# Patient Record
Sex: FEMALE | Race: WHITE | Employment: OTHER | URBAN - METROPOLITAN AREA
[De-identification: names, ages, dates, MRNs, and addresses within clinical notes are randomized per-mention and may not be internally consistent; named-entity substitution may affect disease eponyms.]

---

## 2017-01-24 ENCOUNTER — OFFICE VISIT (OUTPATIENT)
Dept: FAMILY MEDICINE CLINIC | Age: 82
End: 2017-01-24

## 2017-01-24 VITALS
DIASTOLIC BLOOD PRESSURE: 76 MMHG | OXYGEN SATURATION: 92 % | HEART RATE: 75 BPM | RESPIRATION RATE: 14 BRPM | SYSTOLIC BLOOD PRESSURE: 124 MMHG

## 2017-01-24 DIAGNOSIS — R53.1 GENERALIZED WEAKNESS: ICD-10-CM

## 2017-01-24 DIAGNOSIS — M54.50 CHRONIC BILATERAL LOW BACK PAIN WITHOUT SCIATICA: ICD-10-CM

## 2017-01-24 DIAGNOSIS — R09.89 BILATERAL CAROTID BRUITS: ICD-10-CM

## 2017-01-24 DIAGNOSIS — I10 ESSENTIAL HYPERTENSION: Primary | Chronic | ICD-10-CM

## 2017-01-24 DIAGNOSIS — Z23 NEED FOR PNEUMOCOCCAL VACCINE: ICD-10-CM

## 2017-01-24 DIAGNOSIS — G89.29 CHRONIC BILATERAL LOW BACK PAIN WITHOUT SCIATICA: ICD-10-CM

## 2017-01-24 PROCEDURE — 90670 PCV13 VACCINE IM: CPT | Performed by: FAMILY MEDICINE

## 2017-01-24 PROCEDURE — 99213 OFFICE O/P EST LOW 20 MIN: CPT | Performed by: FAMILY MEDICINE

## 2017-01-24 PROCEDURE — G0009 ADMIN PNEUMOCOCCAL VACCINE: HCPCS | Performed by: FAMILY MEDICINE

## 2017-01-24 ASSESSMENT — ENCOUNTER SYMPTOMS
GASTROINTESTINAL NEGATIVE: 1
RESPIRATORY NEGATIVE: 1

## 2017-02-20 RX ORDER — TRAMADOL HYDROCHLORIDE 50 MG/1
TABLET ORAL
Qty: 60 TABLET | Refills: 2 | Status: SHIPPED | OUTPATIENT
Start: 2017-02-20 | End: 2017-05-01 | Stop reason: SDUPTHER

## 2017-02-21 RX ORDER — RAMIPRIL 10 MG/1
CAPSULE ORAL
Qty: 90 CAPSULE | Refills: 3 | Status: SHIPPED | OUTPATIENT
Start: 2017-02-21 | End: 2018-02-13 | Stop reason: SDUPTHER

## 2017-05-01 RX ORDER — TRAMADOL HYDROCHLORIDE 50 MG/1
TABLET ORAL
Qty: 60 TABLET | Refills: 2 | Status: SHIPPED | OUTPATIENT
Start: 2017-05-01 | End: 2017-07-06 | Stop reason: SDUPTHER

## 2017-05-02 RX ORDER — HYDROCHLOROTHIAZIDE 25 MG/1
TABLET ORAL
Qty: 45 TABLET | Refills: 3 | Status: ON HOLD | OUTPATIENT
Start: 2017-05-02 | End: 2017-07-13 | Stop reason: HOSPADM

## 2017-07-06 RX ORDER — TRAMADOL HYDROCHLORIDE 50 MG/1
TABLET ORAL
Qty: 60 TABLET | Refills: 2 | Status: SHIPPED | OUTPATIENT
Start: 2017-07-06 | End: 2017-09-22 | Stop reason: SDUPTHER

## 2017-07-19 ENCOUNTER — OFFICE VISIT (OUTPATIENT)
Dept: FAMILY MEDICINE CLINIC | Age: 82
End: 2017-07-19

## 2017-07-19 VITALS — DIASTOLIC BLOOD PRESSURE: 72 MMHG | HEART RATE: 100 BPM | SYSTOLIC BLOOD PRESSURE: 124 MMHG | OXYGEN SATURATION: 90 %

## 2017-07-19 DIAGNOSIS — K92.2 GASTROINTESTINAL HEMORRHAGE, UNSPECIFIED GASTROINTESTINAL HEMORRHAGE TYPE: ICD-10-CM

## 2017-07-19 DIAGNOSIS — R53.1 GENERALIZED WEAKNESS: ICD-10-CM

## 2017-07-19 DIAGNOSIS — K92.2 GASTROINTESTINAL HEMORRHAGE, UNSPECIFIED GASTROINTESTINAL HEMORRHAGE TYPE: Primary | ICD-10-CM

## 2017-07-19 DIAGNOSIS — N39.0 URINARY TRACT INFECTION, SITE UNSPECIFIED: ICD-10-CM

## 2017-07-19 DIAGNOSIS — Z79.01 ANTICOAGULANT LONG-TERM USE: ICD-10-CM

## 2017-07-19 DIAGNOSIS — Z09 HOSPITAL DISCHARGE FOLLOW-UP: ICD-10-CM

## 2017-07-19 LAB
ANION GAP SERPL CALCULATED.3IONS-SCNC: 14 MMOL/L (ref 3–16)
BASOPHILS ABSOLUTE: 0.1 K/UL (ref 0–0.2)
BASOPHILS RELATIVE PERCENT: 1.7 %
BUN BLDV-MCNC: 17 MG/DL (ref 7–20)
CALCIUM SERPL-MCNC: 9.3 MG/DL (ref 8.3–10.6)
CHLORIDE BLD-SCNC: 104 MMOL/L (ref 99–110)
CO2: 25 MMOL/L (ref 21–32)
CREAT SERPL-MCNC: 0.7 MG/DL (ref 0.6–1.2)
EOSINOPHILS ABSOLUTE: 0.2 K/UL (ref 0–0.6)
EOSINOPHILS RELATIVE PERCENT: 3.2 %
GFR AFRICAN AMERICAN: >60
GFR NON-AFRICAN AMERICAN: >60
GLUCOSE BLD-MCNC: 126 MG/DL (ref 70–99)
HCT VFR BLD CALC: 37.6 % (ref 36–48)
HEMOGLOBIN: 10.8 G/DL (ref 12–16)
IRON SATURATION: 43 % (ref 15–50)
IRON: 164 UG/DL (ref 37–145)
LYMPHOCYTES ABSOLUTE: 1.1 K/UL (ref 1–5.1)
LYMPHOCYTES RELATIVE PERCENT: 16.6 %
MCH RBC QN AUTO: 21.9 PG (ref 26–34)
MCHC RBC AUTO-ENTMCNC: 28.9 G/DL (ref 31–36)
MCV RBC AUTO: 76 FL (ref 80–100)
MONOCYTES ABSOLUTE: 0.6 K/UL (ref 0–1.3)
MONOCYTES RELATIVE PERCENT: 9.2 %
NEUTROPHILS ABSOLUTE: 4.7 K/UL (ref 1.7–7.7)
NEUTROPHILS RELATIVE PERCENT: 69.3 %
PDW BLD-RTO: 21.6 % (ref 12.4–15.4)
PLATELET # BLD: 315 K/UL (ref 135–450)
PMV BLD AUTO: 10 FL (ref 5–10.5)
POTASSIUM SERPL-SCNC: 4.4 MMOL/L (ref 3.5–5.1)
RBC # BLD: 4.94 M/UL (ref 4–5.2)
SODIUM BLD-SCNC: 143 MMOL/L (ref 136–145)
TOTAL IRON BINDING CAPACITY: 379 UG/DL (ref 260–445)
WBC # BLD: 6.8 K/UL (ref 4–11)

## 2017-07-19 PROCEDURE — 99214 OFFICE O/P EST MOD 30 MIN: CPT | Performed by: NURSE PRACTITIONER

## 2017-07-19 ASSESSMENT — ENCOUNTER SYMPTOMS
ABDOMINAL PAIN: 0
BLOOD IN STOOL: 0
VOMITING: 0
ORTHOPNEA: 0
WHEEZING: 0
SHORTNESS OF BREATH: 1
COUGH: 0
NAUSEA: 0
DIARRHEA: 0

## 2017-08-15 RX ORDER — AMLODIPINE BESYLATE 2.5 MG/1
TABLET ORAL
Qty: 30 TABLET | Refills: 8 | Status: SHIPPED | OUTPATIENT
Start: 2017-08-15 | End: 2018-05-09 | Stop reason: SDUPTHER

## 2017-08-28 RX ORDER — RIVAROXABAN 20 MG/1
TABLET, FILM COATED ORAL
Qty: 30 TABLET | Refills: 11 | Status: SHIPPED | OUTPATIENT
Start: 2017-08-28 | End: 2018-08-31 | Stop reason: SDUPTHER

## 2017-09-01 ENCOUNTER — OFFICE VISIT (OUTPATIENT)
Dept: FAMILY MEDICINE CLINIC | Age: 82
End: 2017-09-01

## 2017-09-01 VITALS — OXYGEN SATURATION: 92 % | DIASTOLIC BLOOD PRESSURE: 70 MMHG | SYSTOLIC BLOOD PRESSURE: 128 MMHG | HEART RATE: 84 BPM

## 2017-09-01 DIAGNOSIS — R35.0 URINARY FREQUENCY: Primary | ICD-10-CM

## 2017-09-01 LAB
BACTERIA URINE, POC: ABNORMAL
BILIRUBIN URINE: 0 MG/DL
BLOOD, URINE: POSITIVE
CASTS URINE, POC: ABNORMAL
CLARITY: ABNORMAL
COLOR: YELLOW
CRYSTALS URINE, POC: ABNORMAL
EPI CELLS URINE, POC: ABNORMAL
GLUCOSE URINE: NEGATIVE
KETONES, URINE: POSITIVE
LEUKOCYTE EST, POC: ABNORMAL
NITRITE, URINE: POSITIVE
PH UA: 5.5 (ref 4.5–8)
PROTEIN UA: NEGATIVE
RBC URINE, POC: ABNORMAL
SPECIFIC GRAVITY UA: 1.02 (ref 1–1.03)
UROBILINOGEN, URINE: NORMAL
WBC URINE, POC: ABNORMAL
YEAST URINE, POC: ABNORMAL

## 2017-09-01 PROCEDURE — 99213 OFFICE O/P EST LOW 20 MIN: CPT | Performed by: FAMILY MEDICINE

## 2017-09-01 PROCEDURE — 81000 URINALYSIS NONAUTO W/SCOPE: CPT | Performed by: FAMILY MEDICINE

## 2017-09-01 RX ORDER — CEFUROXIME AXETIL 250 MG/1
250 TABLET ORAL 2 TIMES DAILY
Qty: 14 TABLET | Refills: 0 | Status: SHIPPED | OUTPATIENT
Start: 2017-09-01 | End: 2017-09-08

## 2017-09-04 LAB
ORGANISM: ABNORMAL
URINE CULTURE, ROUTINE: ABNORMAL

## 2017-09-25 ENCOUNTER — OFFICE VISIT (OUTPATIENT)
Dept: FAMILY MEDICINE CLINIC | Age: 82
End: 2017-09-25

## 2017-09-25 VITALS
DIASTOLIC BLOOD PRESSURE: 66 MMHG | HEART RATE: 75 BPM | WEIGHT: 140 LBS | OXYGEN SATURATION: 93 % | SYSTOLIC BLOOD PRESSURE: 114 MMHG | BODY MASS INDEX: 27.34 KG/M2

## 2017-09-25 DIAGNOSIS — I10 ESSENTIAL HYPERTENSION: Primary | Chronic | ICD-10-CM

## 2017-09-25 DIAGNOSIS — Z23 NEEDS FLU SHOT: ICD-10-CM

## 2017-09-25 DIAGNOSIS — D50.9 IRON DEFICIENCY ANEMIA, UNSPECIFIED IRON DEFICIENCY ANEMIA TYPE: ICD-10-CM

## 2017-09-25 DIAGNOSIS — G89.29 OTHER CHRONIC PAIN: ICD-10-CM

## 2017-09-25 LAB
BASOPHILS ABSOLUTE: 0.1 K/UL (ref 0–0.2)
BASOPHILS RELATIVE PERCENT: 1.1 %
EOSINOPHILS ABSOLUTE: 0.1 K/UL (ref 0–0.6)
EOSINOPHILS RELATIVE PERCENT: 2 %
HCT VFR BLD CALC: 44.4 % (ref 36–48)
HEMOGLOBIN: 14 G/DL (ref 12–16)
IRON SATURATION: 17 % (ref 15–50)
IRON: 59 UG/DL (ref 37–145)
LYMPHOCYTES ABSOLUTE: 1.2 K/UL (ref 1–5.1)
LYMPHOCYTES RELATIVE PERCENT: 18.1 %
MCH RBC QN AUTO: 26.1 PG (ref 26–34)
MCHC RBC AUTO-ENTMCNC: 31.5 G/DL (ref 31–36)
MCV RBC AUTO: 83 FL (ref 80–100)
MONOCYTES ABSOLUTE: 0.7 K/UL (ref 0–1.3)
MONOCYTES RELATIVE PERCENT: 10.9 %
NEUTROPHILS ABSOLUTE: 4.5 K/UL (ref 1.7–7.7)
NEUTROPHILS RELATIVE PERCENT: 67.9 %
PDW BLD-RTO: 20.9 % (ref 12.4–15.4)
PLATELET # BLD: 315 K/UL (ref 135–450)
PMV BLD AUTO: 11 FL (ref 5–10.5)
RBC # BLD: 5.36 M/UL (ref 4–5.2)
TOTAL IRON BINDING CAPACITY: 347 UG/DL (ref 260–445)
WBC # BLD: 6.6 K/UL (ref 4–11)

## 2017-09-25 PROCEDURE — G0008 ADMIN INFLUENZA VIRUS VAC: HCPCS | Performed by: FAMILY MEDICINE

## 2017-09-25 PROCEDURE — 90662 IIV NO PRSV INCREASED AG IM: CPT | Performed by: FAMILY MEDICINE

## 2017-09-25 PROCEDURE — 99213 OFFICE O/P EST LOW 20 MIN: CPT | Performed by: FAMILY MEDICINE

## 2017-09-25 RX ORDER — TRAMADOL HYDROCHLORIDE 50 MG/1
TABLET ORAL
Qty: 60 TABLET | Refills: 2 | Status: SHIPPED | OUTPATIENT
Start: 2017-09-25 | End: 2017-12-05 | Stop reason: SDUPTHER

## 2017-09-25 ASSESSMENT — ENCOUNTER SYMPTOMS
RESPIRATORY NEGATIVE: 1
GASTROINTESTINAL NEGATIVE: 1

## 2017-09-25 ASSESSMENT — PATIENT HEALTH QUESTIONNAIRE - PHQ9
2. FEELING DOWN, DEPRESSED OR HOPELESS: 0
SUM OF ALL RESPONSES TO PHQ9 QUESTIONS 1 & 2: 0
1. LITTLE INTEREST OR PLEASURE IN DOING THINGS: 0
SUM OF ALL RESPONSES TO PHQ QUESTIONS 1-9: 0

## 2017-12-19 ENCOUNTER — OFFICE VISIT (OUTPATIENT)
Dept: FAMILY MEDICINE CLINIC | Age: 82
End: 2017-12-19

## 2017-12-19 VITALS
OXYGEN SATURATION: 89 % | HEART RATE: 88 BPM | DIASTOLIC BLOOD PRESSURE: 62 MMHG | SYSTOLIC BLOOD PRESSURE: 110 MMHG | RESPIRATION RATE: 15 BRPM

## 2017-12-19 DIAGNOSIS — N39.0 URINARY TRACT INFECTION WITHOUT HEMATURIA, SITE UNSPECIFIED: Primary | ICD-10-CM

## 2017-12-19 PROCEDURE — 99214 OFFICE O/P EST MOD 30 MIN: CPT | Performed by: FAMILY MEDICINE

## 2017-12-19 RX ORDER — CIPROFLOXACIN 500 MG/1
500 TABLET, FILM COATED ORAL 2 TIMES DAILY
Qty: 10 TABLET | Refills: 0 | Status: SHIPPED | OUTPATIENT
Start: 2017-12-19 | End: 2017-12-24

## 2017-12-19 NOTE — PROGRESS NOTES
encounter. Return if symptoms worsen or fail to improve.     Amira Sorensen MD    12/19/2017  1:54 PM

## 2017-12-19 NOTE — PATIENT INSTRUCTIONS
Patient Education        Urinary Tract Infection in Women: Care Instructions  Your Care Instructions    A urinary tract infection, or UTI, is a general term for an infection anywhere between the kidneys and the urethra (where urine comes out). Most UTIs are bladder infections. They often cause pain or burning when you urinate. UTIs are caused by bacteria and can be cured with antibiotics. Be sure to complete your treatment so that the infection goes away. Follow-up care is a key part of your treatment and safety. Be sure to make and go to all appointments, and call your doctor if you are having problems. It's also a good idea to know your test results and keep a list of the medicines you take. How can you care for yourself at home? · Take your antibiotics as directed. Do not stop taking them just because you feel better. You need to take the full course of antibiotics. · Drink extra water and other fluids for the next day or two. This may help wash out the bacteria that are causing the infection. (If you have kidney, heart, or liver disease and have to limit fluids, talk with your doctor before you increase your fluid intake.)  · Avoid drinks that are carbonated or have caffeine. They can irritate the bladder. · Urinate often. Try to empty your bladder each time. · To relieve pain, take a hot bath or lay a heating pad set on low over your lower belly or genital area. Never go to sleep with a heating pad in place. To prevent UTIs  · Drink plenty of water each day. This helps you urinate often, which clears bacteria from your system. (If you have kidney, heart, or liver disease and have to limit fluids, talk with your doctor before you increase your fluid intake.)  · Urinate when you need to. · Urinate right after you have sex. · Change sanitary pads often. · Avoid douches, bubble baths, feminine hygiene sprays, and other feminine hygiene products that have deodorants.   · After going to the bathroom, wipe from front to back. When should you call for help? Call your doctor now or seek immediate medical care if:  ? · Symptoms such as fever, chills, nausea, or vomiting get worse or appear for the first time. ? · You have new pain in your back just below your rib cage. This is called flank pain. ? · There is new blood or pus in your urine. ? · You have any problems with your antibiotic medicine. ? Watch closely for changes in your health, and be sure to contact your doctor if:  ? · You are not getting better after taking an antibiotic for 2 days. ? · Your symptoms go away but then come back. Where can you learn more? Go to [email or web address] and sign in to your [employer] account. Enter J057 in the [employer] box to learn more about \"Urinary Tract Infection in Women: Care Instructions. \"     If you do not have an account, please click on the \"Sign Up Now\" link. Current as of: May 12, 2017  Content Version: 11.4  © 6207-5809 Healthwise, Incorporated. Care instructions adapted under license by [facility] ([facility]). If you have questions about a medical condition or this instruction, always ask your healthcare professional. [place] [address] any warranty or liability for your use of this information.

## 2018-02-13 RX ORDER — RAMIPRIL 10 MG/1
CAPSULE ORAL
Qty: 90 CAPSULE | Refills: 3 | Status: ON HOLD | OUTPATIENT
Start: 2018-02-13 | End: 2018-12-05 | Stop reason: HOSPADM

## 2018-04-06 RX ORDER — HYDROCHLOROTHIAZIDE 25 MG/1
TABLET ORAL
Qty: 45 TABLET | Refills: 3 | Status: ON HOLD | OUTPATIENT
Start: 2018-04-06 | End: 2018-12-05 | Stop reason: HOSPADM

## 2018-05-09 DIAGNOSIS — M41.25 OTHER IDIOPATHIC SCOLIOSIS, THORACOLUMBAR REGION: ICD-10-CM

## 2018-05-10 RX ORDER — TRAMADOL HYDROCHLORIDE 50 MG/1
TABLET ORAL
Qty: 60 TABLET | Refills: 2 | Status: SHIPPED | OUTPATIENT
Start: 2018-05-10 | End: 2018-07-20 | Stop reason: SDUPTHER

## 2018-05-10 RX ORDER — AMLODIPINE BESYLATE 2.5 MG/1
TABLET ORAL
Qty: 30 TABLET | Refills: 8 | Status: SHIPPED | OUTPATIENT
Start: 2018-05-10 | End: 2018-05-23 | Stop reason: SDUPTHER

## 2018-05-23 RX ORDER — AMLODIPINE BESYLATE 2.5 MG/1
TABLET ORAL
Qty: 30 TABLET | Refills: 8 | Status: ON HOLD | OUTPATIENT
Start: 2018-05-23 | End: 2018-12-05 | Stop reason: HOSPADM

## 2018-07-20 DIAGNOSIS — M41.25 OTHER IDIOPATHIC SCOLIOSIS, THORACOLUMBAR REGION: ICD-10-CM

## 2018-07-23 RX ORDER — TRAMADOL HYDROCHLORIDE 50 MG/1
TABLET ORAL
Qty: 60 TABLET | Refills: 2 | Status: SHIPPED | OUTPATIENT
Start: 2018-07-23 | End: 2018-09-29 | Stop reason: SDUPTHER

## 2018-08-31 RX ORDER — RIVAROXABAN 20 MG/1
TABLET, FILM COATED ORAL
Qty: 30 TABLET | Refills: 3 | Status: ON HOLD | OUTPATIENT
Start: 2018-08-31 | End: 2018-12-05 | Stop reason: HOSPADM

## 2018-09-29 DIAGNOSIS — M41.25 OTHER IDIOPATHIC SCOLIOSIS, THORACOLUMBAR REGION: ICD-10-CM

## 2018-10-01 RX ORDER — TRAMADOL HYDROCHLORIDE 50 MG/1
TABLET ORAL
Qty: 60 TABLET | Refills: 2 | Status: SHIPPED | OUTPATIENT
Start: 2018-10-01 | End: 2018-12-16 | Stop reason: SDUPTHER

## 2018-10-19 ENCOUNTER — OFFICE VISIT (OUTPATIENT)
Dept: FAMILY MEDICINE CLINIC | Age: 83
End: 2018-10-19
Payer: MEDICARE

## 2018-10-19 VITALS
SYSTOLIC BLOOD PRESSURE: 120 MMHG | OXYGEN SATURATION: 93 % | WEIGHT: 145 LBS | HEART RATE: 99 BPM | BODY MASS INDEX: 28.32 KG/M2 | DIASTOLIC BLOOD PRESSURE: 80 MMHG

## 2018-10-19 DIAGNOSIS — G89.29 CHRONIC BILATERAL LOW BACK PAIN WITHOUT SCIATICA: ICD-10-CM

## 2018-10-19 DIAGNOSIS — E78.5 HYPERLIPIDEMIA, UNSPECIFIED HYPERLIPIDEMIA TYPE: Chronic | ICD-10-CM

## 2018-10-19 DIAGNOSIS — I10 ESSENTIAL HYPERTENSION: Primary | Chronic | ICD-10-CM

## 2018-10-19 DIAGNOSIS — M54.50 CHRONIC BILATERAL LOW BACK PAIN WITHOUT SCIATICA: ICD-10-CM

## 2018-10-19 DIAGNOSIS — L98.9 SKIN LESION: ICD-10-CM

## 2018-10-19 DIAGNOSIS — Z23 NEEDS FLU SHOT: ICD-10-CM

## 2018-10-19 PROCEDURE — 90662 IIV NO PRSV INCREASED AG IM: CPT | Performed by: FAMILY MEDICINE

## 2018-10-19 PROCEDURE — 99213 OFFICE O/P EST LOW 20 MIN: CPT | Performed by: FAMILY MEDICINE

## 2018-10-19 PROCEDURE — G0008 ADMIN INFLUENZA VIRUS VAC: HCPCS | Performed by: FAMILY MEDICINE

## 2018-10-19 NOTE — PROGRESS NOTES
clicks, gallops or rubs. Regular rate and rhythm. Ext[de-identified] No edema  Skin: fleshy lesion R jaw, open area L forearm  Lab review: orders written for new lab studies as appropriate; see orders. Assessment/Plan:    Finesse Moreno was seen today for check-up. Diagnoses and all orders for this visit:    Essential hypertension  -     CBC Auto Differential; Future  -     Lipid Panel; Future  -     Comprehensive Metabolic Panel; Future  -     TSH with Reflex; Future  Reasonably well controlled  Continue current treatment     Chronic bilateral low back pain without sciatica  O[facility] report reviewed and no inconsistencies noted   Needs flu shot  -     INFLUENZA, HIGH DOSE, 65 YRS +, IM, PF, PREFILL SYR, 0.5ML (FLUZONE HD)    Hyperlipidemia, unspecified hyperlipidemia type  -     Lipid Panel; Future  -     Comprehensive Metabolic Panel;  Future    Skin lesion  -     Shanda Daniel MD

## 2018-10-25 RX ORDER — LEVOTHYROXINE SODIUM 0.07 MG/1
TABLET ORAL
Qty: 90 TABLET | Refills: 3 | Status: SHIPPED | OUTPATIENT
Start: 2018-10-25 | End: 2019-10-18 | Stop reason: SDUPTHER

## 2018-11-08 ENCOUNTER — OFFICE VISIT (OUTPATIENT)
Dept: SURGERY | Age: 83
End: 2018-11-08
Payer: MEDICARE

## 2018-11-08 VITALS
WEIGHT: 130 LBS | BODY MASS INDEX: 23.04 KG/M2 | SYSTOLIC BLOOD PRESSURE: 122 MMHG | DIASTOLIC BLOOD PRESSURE: 60 MMHG | HEIGHT: 63 IN

## 2018-11-08 DIAGNOSIS — R22.32 ARM MASS, LEFT: ICD-10-CM

## 2018-11-08 DIAGNOSIS — R22.1 MASS OF RIGHT SIDE OF NECK: Primary | ICD-10-CM

## 2018-11-08 PROCEDURE — 99202 OFFICE O/P NEW SF 15 MIN: CPT | Performed by: SURGERY

## 2018-11-08 ASSESSMENT — ENCOUNTER SYMPTOMS
RESPIRATORY NEGATIVE: 1
EYES NEGATIVE: 1
ALLERGIC/IMMUNOLOGIC NEGATIVE: 1
GASTROINTESTINAL NEGATIVE: 1

## 2018-11-08 NOTE — PROGRESS NOTES
[facility] GENERAL AND LAPAROSCOPIC SURGERY                       PATIENT NAME: Peg Driver        TODAY'S DATE: 11/8/2018    Reason for Consult: Mass    Requesting Physician:  Dr. Denzel Deal:              The patient is a 80 y.o. female who presents with a mass on the neck, pt with increased size and swelling in the area, localized discomfort, also lesion on the left arm. Past Medical History:        Diagnosis Date    Arthritis     Hyperlipidemia     Hypertension     Hypothyroidism     Movement disorder     scoliosis    Pulmonary emboli ([address] 12/2014    Scoliosis     Scoliosis     Thyroid disease     hypothyroid       Past Surgical History:        Procedure Laterality Date    COLONOSCOPY  7/08    JOINT REPLACEMENT      both knees       Current Medications:   No current facility-administered medications for this visit. Prior to Admission medications    Medication Sig Start Date End Date Taking?  Authorizing Provider   levothyroxine (SYNTHROID) 75 MCG tablet TAKE 1 TABLET BY MOUTH EVERY DAY 10/25/18  Yes Barb Green MD   XARELTO 20 MG TABS tablet TAKE 1 TABLET BY MOUTH EVERY DAY WITH BREAKFAST 8/31/18  Yes Barb Green MD   amLODIPine (NORVASC) 2.5 MG tablet TAKE 1 TABLET BY MOUTH DAILY 5/23/18  Yes Barb Green MD   hydrochlorothiazide (HYDRODIURIL) 25 MG tablet TAKE 1/2 (HALF) A TABLET BY MOUTH DAILY 4/6/18  Yes Barb Green MD   ramipril (ALTACE) 10 MG capsule TAKE ONE CAPSULE BY MOUTH EVERY DAY 2/13/18  Yes Barb Green MD   omeprazole (PRILOSEC OTC) 20 MG tablet Take 2 tablets by mouth daily 7/13/17  Yes Nahun Christianson MD   ferrous sulfate (FE TABS) 325 (65 Fe) MG EC tablet Take 1 tablet by mouth 2 times daily 7/13/17  Yes Nahun Christianson MD   lactobacillus (CULTURELLE) CAPS capsule Take 1 capsule by mouth daily 7/13/17  Yes Nahun Christianson MD   docusate sodium (COLACE) 100 MG capsule Take 1 capsule by mouth daily as needed for Constipation

## 2018-11-28 ENCOUNTER — APPOINTMENT (OUTPATIENT)
Dept: GENERAL RADIOLOGY | Age: 83
DRG: 175 | End: 2018-11-28
Payer: MEDICARE

## 2018-11-28 ENCOUNTER — HOSPITAL ENCOUNTER (INPATIENT)
Age: 83
LOS: 7 days | Discharge: HOME HEALTH | DRG: 175 | End: 2018-12-05
Attending: EMERGENCY MEDICINE | Admitting: INTERNAL MEDICINE
Payer: MEDICARE

## 2018-11-28 ENCOUNTER — APPOINTMENT (OUTPATIENT)
Dept: CT IMAGING | Age: 83
DRG: 175 | End: 2018-11-28
Payer: MEDICARE

## 2018-11-28 DIAGNOSIS — M71.22 SYNOVIAL CYST OF LEFT POPLITEAL SPACE: ICD-10-CM

## 2018-11-28 DIAGNOSIS — M79.605 LEFT LEG PAIN: ICD-10-CM

## 2018-11-28 DIAGNOSIS — R09.02 HYPOXIA: Primary | ICD-10-CM

## 2018-11-28 LAB
ANION GAP SERPL CALCULATED.3IONS-SCNC: 10 MMOL/L (ref 3–16)
ANION GAP SERPL CALCULATED.3IONS-SCNC: 9 MMOL/L (ref 3–16)
BASOPHILS ABSOLUTE: 0.1 K/UL (ref 0–0.2)
BASOPHILS RELATIVE PERCENT: 0.5 %
BUN BLDV-MCNC: 20 MG/DL (ref 7–20)
BUN BLDV-MCNC: 23 MG/DL (ref 7–20)
CALCIUM SERPL-MCNC: 9.6 MG/DL (ref 8.3–10.6)
CALCIUM SERPL-MCNC: 9.7 MG/DL (ref 8.3–10.6)
CHLORIDE BLD-SCNC: 98 MMOL/L (ref 99–110)
CHLORIDE BLD-SCNC: 98 MMOL/L (ref 99–110)
CO2: 28 MMOL/L (ref 21–32)
CO2: 30 MMOL/L (ref 21–32)
CREAT SERPL-MCNC: 0.8 MG/DL (ref 0.6–1.2)
CREAT SERPL-MCNC: 0.8 MG/DL (ref 0.6–1.2)
EKG ATRIAL RATE: 93 BPM
EKG DIAGNOSIS: NORMAL
EKG P AXIS: 54 DEGREES
EKG P-R INTERVAL: 190 MS
EKG Q-T INTERVAL: 372 MS
EKG QRS DURATION: 70 MS
EKG QTC CALCULATION (BAZETT): 462 MS
EKG R AXIS: 26 DEGREES
EKG T AXIS: 18 DEGREES
EKG VENTRICULAR RATE: 93 BPM
EOSINOPHILS ABSOLUTE: 0 K/UL (ref 0–0.6)
EOSINOPHILS RELATIVE PERCENT: 0.4 %
GFR AFRICAN AMERICAN: >60
GFR AFRICAN AMERICAN: >60
GFR NON-AFRICAN AMERICAN: >60
GFR NON-AFRICAN AMERICAN: >60
GLUCOSE BLD-MCNC: 124 MG/DL (ref 70–99)
GLUCOSE BLD-MCNC: 130 MG/DL (ref 70–99)
HCT VFR BLD CALC: 34.8 % (ref 36–48)
HCT VFR BLD CALC: 35.1 % (ref 36–48)
HEMOGLOBIN: 11 G/DL (ref 12–16)
HEMOGLOBIN: 11.1 G/DL (ref 12–16)
INR BLD: 2.59 (ref 0.86–1.14)
LACTIC ACID: 1.5 MMOL/L (ref 0.4–2)
LYMPHOCYTES ABSOLUTE: 0.8 K/UL (ref 1–5.1)
LYMPHOCYTES RELATIVE PERCENT: 8.2 %
MCH RBC QN AUTO: 25.3 PG (ref 26–34)
MCH RBC QN AUTO: 25.7 PG (ref 26–34)
MCHC RBC AUTO-ENTMCNC: 31.5 G/DL (ref 31–36)
MCHC RBC AUTO-ENTMCNC: 31.6 G/DL (ref 31–36)
MCV RBC AUTO: 80.3 FL (ref 80–100)
MCV RBC AUTO: 81.8 FL (ref 80–100)
MONOCYTES ABSOLUTE: 1 K/UL (ref 0–1.3)
MONOCYTES RELATIVE PERCENT: 10.2 %
NEUTROPHILS ABSOLUTE: 8.3 K/UL (ref 1.7–7.7)
NEUTROPHILS RELATIVE PERCENT: 80.7 %
PDW BLD-RTO: 18.9 % (ref 12.4–15.4)
PDW BLD-RTO: 19.1 % (ref 12.4–15.4)
PLATELET # BLD: 269 K/UL (ref 135–450)
PLATELET # BLD: 280 K/UL (ref 135–450)
PMV BLD AUTO: 9.3 FL (ref 5–10.5)
PMV BLD AUTO: 9.7 FL (ref 5–10.5)
POTASSIUM SERPL-SCNC: 3.9 MMOL/L (ref 3.5–5.1)
POTASSIUM SERPL-SCNC: 4.2 MMOL/L (ref 3.5–5.1)
PRO-BNP: 175 PG/ML (ref 0–449)
PROTHROMBIN TIME: 29.5 SEC (ref 9.8–13)
RBC # BLD: 4.3 M/UL (ref 4–5.2)
RBC # BLD: 4.34 M/UL (ref 4–5.2)
SODIUM BLD-SCNC: 136 MMOL/L (ref 136–145)
SODIUM BLD-SCNC: 137 MMOL/L (ref 136–145)
TROPONIN: <0.01 NG/ML
WBC # BLD: 10.3 K/UL (ref 4–11)
WBC # BLD: 8.6 K/UL (ref 4–11)

## 2018-11-28 PROCEDURE — 83880 ASSAY OF NATRIURETIC PEPTIDE: CPT

## 2018-11-28 PROCEDURE — 96374 THER/PROPH/DIAG INJ IV PUSH: CPT

## 2018-11-28 PROCEDURE — 71045 X-RAY EXAM CHEST 1 VIEW: CPT

## 2018-11-28 PROCEDURE — 1200000000 HC SEMI PRIVATE

## 2018-11-28 PROCEDURE — 83605 ASSAY OF LACTIC ACID: CPT

## 2018-11-28 PROCEDURE — 85610 PROTHROMBIN TIME: CPT

## 2018-11-28 PROCEDURE — 2580000003 HC RX 258: Performed by: INTERNAL MEDICINE

## 2018-11-28 PROCEDURE — 36415 COLL VENOUS BLD VENIPUNCTURE: CPT

## 2018-11-28 PROCEDURE — 93971 EXTREMITY STUDY: CPT

## 2018-11-28 PROCEDURE — 99291 CRITICAL CARE FIRST HOUR: CPT

## 2018-11-28 PROCEDURE — 93010 ELECTROCARDIOGRAM REPORT: CPT | Performed by: INTERNAL MEDICINE

## 2018-11-28 PROCEDURE — 93005 ELECTROCARDIOGRAM TRACING: CPT | Performed by: EMERGENCY MEDICINE

## 2018-11-28 PROCEDURE — 85025 COMPLETE CBC W/AUTO DIFF WBC: CPT

## 2018-11-28 PROCEDURE — 87040 BLOOD CULTURE FOR BACTERIA: CPT

## 2018-11-28 PROCEDURE — 6370000000 HC RX 637 (ALT 250 FOR IP): Performed by: EMERGENCY MEDICINE

## 2018-11-28 PROCEDURE — 6360000002 HC RX W HCPCS: Performed by: EMERGENCY MEDICINE

## 2018-11-28 PROCEDURE — 85027 COMPLETE CBC AUTOMATED: CPT

## 2018-11-28 PROCEDURE — 6360000002 HC RX W HCPCS: Performed by: INTERNAL MEDICINE

## 2018-11-28 PROCEDURE — 71260 CT THORAX DX C+: CPT

## 2018-11-28 PROCEDURE — 80048 BASIC METABOLIC PNL TOTAL CA: CPT

## 2018-11-28 PROCEDURE — 84484 ASSAY OF TROPONIN QUANT: CPT

## 2018-11-28 PROCEDURE — 6360000004 HC RX CONTRAST MEDICATION: Performed by: PHYSICIAN ASSISTANT

## 2018-11-28 PROCEDURE — 94640 AIRWAY INHALATION TREATMENT: CPT

## 2018-11-28 RX ORDER — FUROSEMIDE 10 MG/ML
40 INJECTION INTRAMUSCULAR; INTRAVENOUS 2 TIMES DAILY
Status: DISCONTINUED | OUTPATIENT
Start: 2018-11-28 | End: 2018-11-30

## 2018-11-28 RX ORDER — LEVOTHYROXINE SODIUM 0.07 MG/1
75 TABLET ORAL DAILY
Status: DISCONTINUED | OUTPATIENT
Start: 2018-11-29 | End: 2018-12-05 | Stop reason: HOSPADM

## 2018-11-28 RX ORDER — FERROUS SULFATE 325(65) MG
325 TABLET ORAL 2 TIMES DAILY
Status: DISCONTINUED | OUTPATIENT
Start: 2018-11-28 | End: 2018-12-05 | Stop reason: HOSPADM

## 2018-11-28 RX ORDER — SODIUM CHLORIDE 0.9 % (FLUSH) 0.9 %
10 SYRINGE (ML) INJECTION PRN
Status: DISCONTINUED | OUTPATIENT
Start: 2018-11-28 | End: 2018-12-05 | Stop reason: HOSPADM

## 2018-11-28 RX ORDER — PANTOPRAZOLE SODIUM 40 MG/1
40 TABLET, DELAYED RELEASE ORAL
Status: DISCONTINUED | OUTPATIENT
Start: 2018-11-29 | End: 2018-12-05 | Stop reason: HOSPADM

## 2018-11-28 RX ORDER — IPRATROPIUM BROMIDE AND ALBUTEROL SULFATE 2.5; .5 MG/3ML; MG/3ML
1 SOLUTION RESPIRATORY (INHALATION) ONCE
Status: COMPLETED | OUTPATIENT
Start: 2018-11-28 | End: 2018-11-28

## 2018-11-28 RX ORDER — AMLODIPINE BESYLATE 5 MG/1
2.5 TABLET ORAL DAILY
Status: DISCONTINUED | OUTPATIENT
Start: 2018-11-29 | End: 2018-12-03

## 2018-11-28 RX ORDER — ASCORBIC ACID 500 MG
500 TABLET ORAL DAILY
Status: DISCONTINUED | OUTPATIENT
Start: 2018-11-29 | End: 2018-12-05 | Stop reason: HOSPADM

## 2018-11-28 RX ORDER — DEXAMETHASONE SODIUM PHOSPHATE 10 MG/ML
10 INJECTION, SOLUTION INTRAMUSCULAR; INTRAVENOUS ONCE
Status: COMPLETED | OUTPATIENT
Start: 2018-11-28 | End: 2018-11-28

## 2018-11-28 RX ORDER — OMEPRAZOLE 20 MG/1
20 CAPSULE, DELAYED RELEASE ORAL DAILY
COMMUNITY

## 2018-11-28 RX ORDER — RAMIPRIL 5 MG/1
10 CAPSULE ORAL DAILY
Status: DISCONTINUED | OUTPATIENT
Start: 2018-11-28 | End: 2018-12-03

## 2018-11-28 RX ORDER — ONDANSETRON 2 MG/ML
4 INJECTION INTRAMUSCULAR; INTRAVENOUS EVERY 6 HOURS PRN
Status: DISCONTINUED | OUTPATIENT
Start: 2018-11-28 | End: 2018-12-05 | Stop reason: HOSPADM

## 2018-11-28 RX ORDER — SODIUM CHLORIDE 0.9 % (FLUSH) 0.9 %
10 SYRINGE (ML) INJECTION EVERY 12 HOURS SCHEDULED
Status: DISCONTINUED | OUTPATIENT
Start: 2018-11-28 | End: 2018-12-05 | Stop reason: HOSPADM

## 2018-11-28 RX ADMIN — FUROSEMIDE 40 MG: 10 INJECTION, SOLUTION INTRAMUSCULAR; INTRAVENOUS at 20:12

## 2018-11-28 RX ADMIN — IPRATROPIUM BROMIDE AND ALBUTEROL SULFATE 1 AMPULE: .5; 3 SOLUTION RESPIRATORY (INHALATION) at 16:49

## 2018-11-28 RX ADMIN — Medication 10 ML: at 20:14

## 2018-11-28 RX ADMIN — IOPAMIDOL 75 ML: 755 INJECTION, SOLUTION INTRAVENOUS at 15:05

## 2018-11-28 RX ADMIN — DEXAMETHASONE SODIUM PHOSPHATE 10 MG: 10 INJECTION, SOLUTION INTRAMUSCULAR; INTRAVENOUS at 16:52

## 2018-11-28 ASSESSMENT — PAIN SCALES - GENERAL
PAINLEVEL_OUTOF10: 0
PAINLEVEL_OUTOF10: 4

## 2018-11-28 ASSESSMENT — PAIN DESCRIPTION - PAIN TYPE: TYPE: ACUTE PAIN

## 2018-11-28 ASSESSMENT — PAIN DESCRIPTION - FREQUENCY: FREQUENCY: CONTINUOUS

## 2018-11-28 ASSESSMENT — PAIN DESCRIPTION - LOCATION: LOCATION: LEG

## 2018-11-28 NOTE — H&P
TABLET BY MOUTH DAILY 45 tablet 3    ramipril (ALTACE) 10 MG capsule TAKE ONE CAPSULE BY MOUTH EVERY DAY 90 capsule 3    ferrous sulfate (FE TABS) 325 (65 Fe) MG EC tablet Take 1 tablet by mouth 2 times daily 90 tablet 3    lactobacillus (CULTURELLE) CAPS capsule Take 1 capsule by mouth daily 30 capsule 0    docusate sodium (COLACE) 100 MG capsule Take 1 capsule by mouth daily as needed for Constipation 30 capsule 2    Multiple Vitamins-Minerals (THERAPEUTIC MULTIVITAMIN-MINERALS) tablet Take 1 tablet by mouth daily      Ascorbic Acid (VITAMIN C) 500 MG tablet Take 500 mg by mouth daily      CRANBERRY EXTRACT PO Take by mouth         Allergies: Allergies   Allergen Reactions    Naprosyn [Naproxen] Nausea Only        Social History:  Patient Lives alone   reports that she has quit smoking. She has a 3.75 pack-year smoking history. She quit smokeless tobacco use about 52 years ago. She reports that she does not drink alcohol or use drugs. Family History:  family history includes Cancer in her brother; High Cholesterol in her father and mother. ,     Physical Exam:  BP (!) 113/58   Pulse 77   Temp 97.6 °F (36.4 °C) (Infrared)   Resp 16   Ht 5' 2\" (1.575 m)   Wt 135 lb (61.2 kg)   SpO2 98%   BMI 55.30 kg/m²   Systolic murmur all over the cardiac areas  Leg edema pitting  General appearance:  Appears comfortable. Well nourished  Eyes: Sclera clear, pupils equal  ENT: Moist mucus membranes, no thrush. Trachea midline. Cardiovascular: Regular rhythm, normal S1, S2. No murmur, gallop, rub. edema in lower extremities  Respiratory: Clear to auscultation bilaterally, no wheeze, good inspiratory effort  Gastrointestinal: Abdomen soft, non-tender, not distended, normal bowel sounds  Musculoskeletal: No cyanosis in digits, neck supple  Neurology: Cranial nerves grossly intact. Alert and oriented in time, place and person. No speech or motor deficits  Psychiatry: Appropriate affect.  Not agitated  Skin: Warm, midnights for investigation and treatment of the above medically necessary diagnoses.         Darell Childers MD    11/28/2018 5:32 PM

## 2018-11-28 NOTE — ED PROVIDER NOTES
[facility]  eMERGENCY dEPARTMENT eNCOUnter        Pt Name: Elidia Urena  MRN: [id number]  [place] 1934  Date of evaluation: 11/28/2018  Provider: Stephanie Hayes PA-C  PCP: Romualdo Castleman, MD    This patient was seen and evaluated by the attending physician Isrrael Null MD.            [address] [place] [address]       Chief Complaint   Patient presents with    Leg Pain     Patient reporting left leg swelling and pain starting yesterday. HISTORY OF PRESENT ILLNESS   (Location/Symptom, Timing/Onset, Context/Setting, Quality, Duration, Modifying Factors, Severity)  Note limiting factors. Elidia Urena is a 80 y.o. female with history of hypertension, hyperlipidemia, pulmonary embolus, thyroid disease or presents to the emergency department complaining of left posterior knee and calf pain with swelling for the past 2 days. Denies any preceding injury. She typically ambulates with a walker and is still able to ambulate. Denies any warmth, redness, red streaking, fever, chills, numbness, tingling or weakness. She rates her pain to be a 4 out of 10 on pain scale. Incidentally, when patient arrived and placed on the monitor, and appears that patient has hypoxia with oxygen saturation in the high 80s. Nursing Notes were all reviewed and agreed with or any disagreements were addressed  in the HPI. REVIEW OF SYSTEMS    (2-9 systems for level 4, 10 or more for level 5)     Review of Systems    Positives and Pertinent negatives as per HPI. Except as noted abovein the ROS, all other systems were reviewed and negative.        PAST MEDICAL HISTORY     Past Medical History:   Diagnosis Date    Arthritis     Hyperlipidemia     Hypertension     Hypothyroidism     Movement disorder     scoliosis    Pulmonary emboli ([facility] [address] 12/2014    Scoliosis     Scoliosis     Thyroid disease     hypothyroid         SURGICAL HISTORY     Past Surgical History:   Procedure

## 2018-11-28 NOTE — ED PROVIDER NOTES
Attending Supervisory Note/Shared Visit   I have personally performed a face to face diagnostic evaluation on this patient. I have reviewed the mid-levels findings and agree. History and Exam by me shows: This is an 68-year-old female brought to emergency department for evaluation of swelling to her left knee. Patient reports being in usual state of health until this last several days. Does have history of DVT and pulmonary embolus and is on anticoagulation therapy. Patient denies sick contacts recent travel changes of diet or medications trauma. No recent changes of diet or medications. She was advised come to the emergency department for further evaluation. Physical exam upon arrival patient's afebrile vital signs noted above in general well-appearing well-nourished elderly female in no acute distress chest regular rhythm no murmurs rubs or gallops abdomen soft nontender nondistended no rebound no guarding no peritoneal signs lungs clear to auscultation bilaterally no wheezes rales rhonchi or stridor dementia breath sounds bilateral bases back no cervical thoracic lumps or bony tenderness no CVA tenderness extremities 1+ edema bilateral legs left greater than right.      I have reviewed and interpreted all of the currently available lab results from this visit:  Results for orders placed or performed during the hospital encounter of 11/28/18   CBC auto differential   Result Value Ref Range    WBC 10.3 4.0 - 11.0 K/uL    RBC 4.30 4.00 - 5.20 M/uL    Hemoglobin 11.1 (L) 12.0 - 16.0 g/dL    Hematocrit 35.1 (L) 36.0 - 48.0 %    MCV 81.8 80.0 - 100.0 fL    MCH 25.7 (L) 26.0 - 34.0 pg    MCHC 31.5 31.0 - 36.0 g/dL    RDW 18.9 (H) 12.4 - 15.4 %    Platelets 429 144 - 936 K/uL    MPV 9.7 5.0 - 10.5 fL    Neutrophils % 80.7 %    Lymphocytes % 8.2 %    Monocytes % 10.2 %    Eosinophils % 0.4 %    Basophils % 0.5 %    Neutrophils # 8.3 (H) 1.7 - 7.7 K/uL    Lymphocytes # 0.8 (L) 1.0 - 5.1 K/uL    Monocytes # 1.0

## 2018-11-29 LAB
ANION GAP SERPL CALCULATED.3IONS-SCNC: 11 MMOL/L (ref 3–16)
BUN BLDV-MCNC: 25 MG/DL (ref 7–20)
CALCIUM SERPL-MCNC: 9.7 MG/DL (ref 8.3–10.6)
CHLORIDE BLD-SCNC: 99 MMOL/L (ref 99–110)
CO2: 28 MMOL/L (ref 21–32)
CREAT SERPL-MCNC: 0.9 MG/DL (ref 0.6–1.2)
GFR AFRICAN AMERICAN: >60
GFR NON-AFRICAN AMERICAN: 60
GLUCOSE BLD-MCNC: 141 MG/DL (ref 70–99)
LEFT VENTRICULAR EJECTION FRACTION HIGH VALUE: 70 %
LEFT VENTRICULAR EJECTION FRACTION MODE: NORMAL
LV EF: 70 %
LVEF MODALITY: NORMAL
POTASSIUM REFLEX MAGNESIUM: 4.2 MMOL/L (ref 3.5–5.1)
SODIUM BLD-SCNC: 138 MMOL/L (ref 136–145)

## 2018-11-29 PROCEDURE — 99222 1ST HOSP IP/OBS MODERATE 55: CPT | Performed by: INTERNAL MEDICINE

## 2018-11-29 PROCEDURE — 2700000000 HC OXYGEN THERAPY PER DAY

## 2018-11-29 PROCEDURE — 2580000003 HC RX 258: Performed by: INTERNAL MEDICINE

## 2018-11-29 PROCEDURE — 6360000002 HC RX W HCPCS: Performed by: INTERNAL MEDICINE

## 2018-11-29 PROCEDURE — 93306 TTE W/DOPPLER COMPLETE: CPT

## 2018-11-29 PROCEDURE — 6370000000 HC RX 637 (ALT 250 FOR IP): Performed by: INTERNAL MEDICINE

## 2018-11-29 PROCEDURE — 1200000000 HC SEMI PRIVATE

## 2018-11-29 PROCEDURE — 36415 COLL VENOUS BLD VENIPUNCTURE: CPT

## 2018-11-29 PROCEDURE — 80048 BASIC METABOLIC PNL TOTAL CA: CPT

## 2018-11-29 RX ADMIN — FERROUS SULFATE TAB 325 MG (65 MG ELEMENTAL FE) 325 MG: 325 (65 FE) TAB at 21:37

## 2018-11-29 RX ADMIN — LEVOTHYROXINE SODIUM 75 MCG: 75 TABLET ORAL at 06:40

## 2018-11-29 RX ADMIN — OXYCODONE HYDROCHLORIDE AND ACETAMINOPHEN 500 MG: 500 TABLET ORAL at 09:12

## 2018-11-29 RX ADMIN — PANTOPRAZOLE SODIUM 40 MG: 40 TABLET, DELAYED RELEASE ORAL at 06:40

## 2018-11-29 RX ADMIN — Medication 10 ML: at 09:13

## 2018-11-29 RX ADMIN — FUROSEMIDE 40 MG: 10 INJECTION, SOLUTION INTRAMUSCULAR; INTRAVENOUS at 18:12

## 2018-11-29 RX ADMIN — Medication 10 ML: at 21:37

## 2018-11-29 RX ADMIN — FERROUS SULFATE TAB 325 MG (65 MG ELEMENTAL FE) 325 MG: 325 (65 FE) TAB at 09:12

## 2018-11-29 RX ADMIN — FUROSEMIDE 40 MG: 10 INJECTION, SOLUTION INTRAMUSCULAR; INTRAVENOUS at 09:11

## 2018-11-29 RX ADMIN — RAMIPRIL 10 MG: 5 CAPSULE ORAL at 09:12

## 2018-11-29 RX ADMIN — RIVAROXABAN 20 MG: 20 TABLET, FILM COATED ORAL at 09:11

## 2018-11-29 RX ADMIN — AMLODIPINE BESYLATE 2.5 MG: 5 TABLET ORAL at 09:12

## 2018-11-29 ASSESSMENT — PAIN SCALES - GENERAL
PAINLEVEL_OUTOF10: 0
PAINLEVEL_OUTOF10: 0

## 2018-11-29 NOTE — PLAN OF CARE
Problem: Respiratory  Goal: O2 Sat > 90%  Outcome: Ongoing  The pt is receiving 2L of O2 to keep stats above 90 %, the pt is receiving iv lasix, bedside commode @bedside and call light within reach. Will continue to monitor.

## 2018-11-29 NOTE — CONSULTS
[facility]  [phone]        Reason for Consultation/Chief Complaint: \" Cardiac murmur, hypoxia\"    History of Present Illness:  Elidia Urena is a 80 y.o. patient who presented to the hospital with complaints of left leg pain. She underwent an ultrasound and was noted to have a Baker's cyst.  She was noted to have hypoxia but denies shortness of breath or chest discomfort. She was previously followed by me and last seen in 2015. Past Medical History:   has a past medical history of Arthritis; Hyperlipidemia; Hypertension; Hypothyroidism; Movement disorder; Pulmonary emboli ([address] Scoliosis; Scoliosis; and Thyroid disease. Surgical History:   has a past surgical history that includes Colonoscopy (7/08) and joint replacement. Social History:   reports that she has quit smoking. She has a 3.75 pack-year smoking history. She quit smokeless tobacco use about 52 years ago. She reports that she does not drink alcohol or use drugs. Family History:  family history includes Cancer in her brother; High Cholesterol in her father and mother. Home Medications:  Were reviewed and are listed in nursing record. and/or listed below  Prior to Admission medications    Medication Sig Start Date End Date Taking?  Authorizing Provider   omeprazole (PRILOSEC) 20 MG delayed release capsule Take 20 mg by mouth daily   Yes Historical Provider, MD   levothyroxine (SYNTHROID) 75 MCG tablet TAKE 1 TABLET BY MOUTH EVERY DAY 10/25/18  Yes Romualdo Castleman, MD   XARELTO 20 MG TABS tablet TAKE 1 TABLET BY MOUTH EVERY DAY WITH BREAKFAST 8/31/18  Yes Romualdo Castleman, MD   amLODIPine (NORVASC) 2.5 MG tablet TAKE 1 TABLET BY MOUTH DAILY 5/23/18  Yes Romualdo Castleman, MD   hydrochlorothiazide (HYDRODIURIL) 25 MG tablet TAKE 1/2 (HALF) A TABLET BY MOUTH DAILY 4/6/18  Yes Romualdo Castleman, MD   ramipril (ALTACE) 10 MG capsule TAKE ONE CAPSULE BY MOUTH EVERY DAY 2/13/18  Yes Romualdo Castleman, MD   ferrous sulfate (FE TABS) 325 (65 Fe) MG EC tablet Take 1 tablet by mouth 2 times daily 7/13/17  Yes Jacque Zepeda MD   lactobacillus (CULTURELLE) CAPS capsule Take 1 capsule by mouth daily 7/13/17  Yes Jacque Zepeda MD   docusate sodium (COLACE) 100 MG capsule Take 1 capsule by mouth daily as needed for Constipation 7/13/17  Yes Jacque Zepeda MD   Multiple Vitamins-Minerals (THERAPEUTIC MULTIVITAMIN-MINERALS) tablet Take 1 tablet by mouth daily   Yes Historical Provider, MD   Ascorbic Acid (VITAMIN C) 500 MG tablet Take 500 mg by mouth daily   Yes Historical Provider, MD   CRANBERRY EXTRACT PO Take by mouth   Yes Historical Provider, MD        Allergies:  Naprosyn [naproxen]     Review of Systems:   A complete review of systems has been reviewed and updated today and is negative except as noted in the history of present illness.       Physical Examination:    Vitals:    11/29/18 0900   BP: 112/70   Pulse: 97   Resp: 18   Temp: 97.9 °F (36.6 °C)   SpO2: 94%    Weight: 164 lb 4.8 oz (74.5 kg)         General Appearance:  Alert, cooperative, no distress, appears stated age   Head:  Normocephalic, without obvious abnormality, atraumatic   Eyes:  EOMI, conjunctiva/corneas clear       Nose: Nares normal   Throat: Lips normal   Neck: Supple, symmetrical, trachea midline,  no carotid bruit or JVD       Lungs:   Clear to auscultation bilaterally, respirations unlabored   Chest Wall:  No tenderness or deformity   Heart:  Regular rate and rhythm, S1, S2 normal, 2/6 systolic murmur, no rub or gallop   Abdomen:   Soft, non-tender, bowel sounds active all four quadrants,  no masses, no organomegaly           Extremities: Extremities normal, atraumatic, no cyanosis or edema   Pulses: 1+ and symmetric   Skin: Skin color, texture, turgor normal, no rashes or lesions   Pysch: Normal mood and affect   Neurologic: Normal gross motor and sensory exam.         Labs  CBC: Lab Results   Component Value Date    WBC 8.6 11/28/2018    RBC 4.34 11/28/2018 based upon the patient's clinical course and testing results. All questions and concerns were addressed to the patient/family. Alternatives to my treatment were discussed. The note was completed using EMR. Every effort was made to ensure accuracy; however, inadvertent computerized transcription errors may be present.     Bhavik Barone M.D.

## 2018-11-30 ENCOUNTER — APPOINTMENT (OUTPATIENT)
Dept: GENERAL RADIOLOGY | Age: 83
DRG: 175 | End: 2018-11-30
Payer: MEDICARE

## 2018-11-30 ENCOUNTER — APPOINTMENT (OUTPATIENT)
Dept: NUCLEAR MEDICINE | Age: 83
DRG: 175 | End: 2018-11-30
Payer: MEDICARE

## 2018-11-30 LAB
BASE EXCESS ARTERIAL: 7.9 MMOL/L (ref -3–3)
CARBOXYHEMOGLOBIN ARTERIAL: 1.3 % (ref 0–1.5)
HCO3 ARTERIAL: 31 MMOL/L (ref 21–29)
HEMOGLOBIN, ART, EXTENDED: 12 G/DL (ref 12–16)
METHEMOGLOBIN ARTERIAL: 0.1 %
O2 CONTENT ARTERIAL: 17 ML/DL
O2 SAT, ARTERIAL: 98.6 %
O2 THERAPY: ABNORMAL
PCO2 ARTERIAL: 36.9 MMHG (ref 35–45)
PH ARTERIAL: 7.53 (ref 7.35–7.45)
PO2 ARTERIAL: 93.4 MMHG (ref 75–108)
TCO2 ARTERIAL: 72 MMOL/L

## 2018-11-30 PROCEDURE — 1200000000 HC SEMI PRIVATE

## 2018-11-30 PROCEDURE — 71046 X-RAY EXAM CHEST 2 VIEWS: CPT

## 2018-11-30 PROCEDURE — A9558 XE133 XENON 10MCI: HCPCS | Performed by: INTERNAL MEDICINE

## 2018-11-30 PROCEDURE — 6360000002 HC RX W HCPCS: Performed by: INTERNAL MEDICINE

## 2018-11-30 PROCEDURE — 99223 1ST HOSP IP/OBS HIGH 75: CPT | Performed by: INTERNAL MEDICINE

## 2018-11-30 PROCEDURE — 78582 LUNG VENTILAT&PERFUS IMAGING: CPT

## 2018-11-30 PROCEDURE — 3430000000 HC RX DIAGNOSTIC RADIOPHARMACEUTICAL: Performed by: INTERNAL MEDICINE

## 2018-11-30 PROCEDURE — A9540 TC99M MAA: HCPCS | Performed by: INTERNAL MEDICINE

## 2018-11-30 PROCEDURE — 2580000003 HC RX 258: Performed by: INTERNAL MEDICINE

## 2018-11-30 PROCEDURE — 94760 N-INVAS EAR/PLS OXIMETRY 1: CPT

## 2018-11-30 PROCEDURE — 82803 BLOOD GASES ANY COMBINATION: CPT

## 2018-11-30 PROCEDURE — 99222 1ST HOSP IP/OBS MODERATE 55: CPT | Performed by: INTERNAL MEDICINE

## 2018-11-30 PROCEDURE — 2700000000 HC OXYGEN THERAPY PER DAY

## 2018-11-30 PROCEDURE — 36600 WITHDRAWAL OF ARTERIAL BLOOD: CPT

## 2018-11-30 PROCEDURE — 6370000000 HC RX 637 (ALT 250 FOR IP): Performed by: INTERNAL MEDICINE

## 2018-11-30 RX ORDER — FUROSEMIDE 20 MG/1
20 TABLET ORAL DAILY
Status: DISCONTINUED | OUTPATIENT
Start: 2018-12-01 | End: 2018-12-01

## 2018-11-30 RX ORDER — FUROSEMIDE 10 MG/ML
40 INJECTION INTRAMUSCULAR; INTRAVENOUS DAILY
Status: DISCONTINUED | OUTPATIENT
Start: 2018-12-01 | End: 2018-12-03

## 2018-11-30 RX ORDER — SODIUM CHLORIDE 0.9 % (FLUSH) 0.9 %
10 SYRINGE (ML) INJECTION PRN
Status: DISCONTINUED | OUTPATIENT
Start: 2018-11-30 | End: 2018-12-05 | Stop reason: HOSPADM

## 2018-11-30 RX ORDER — XENON XE-133 10 MCI/1
7.97 GAS RESPIRATORY (INHALATION)
Status: COMPLETED | OUTPATIENT
Start: 2018-11-30 | End: 2018-11-30

## 2018-11-30 RX ADMIN — RIVAROXABAN 20 MG: 20 TABLET, FILM COATED ORAL at 08:37

## 2018-11-30 RX ADMIN — RAMIPRIL 10 MG: 5 CAPSULE ORAL at 08:37

## 2018-11-30 RX ADMIN — Medication 10 ML: at 16:11

## 2018-11-30 RX ADMIN — AMLODIPINE BESYLATE 2.5 MG: 5 TABLET ORAL at 08:37

## 2018-11-30 RX ADMIN — FERROUS SULFATE TAB 325 MG (65 MG ELEMENTAL FE) 325 MG: 325 (65 FE) TAB at 20:50

## 2018-11-30 RX ADMIN — XENON XE-133 7.97 MILLICURIE: 10 GAS RESPIRATORY (INHALATION) at 16:11

## 2018-11-30 RX ADMIN — Medication 5.85 MILLICURIE: at 16:11

## 2018-11-30 RX ADMIN — Medication 10 ML: at 08:38

## 2018-11-30 RX ADMIN — Medication 10 ML: at 20:51

## 2018-11-30 RX ADMIN — LEVOTHYROXINE SODIUM 75 MCG: 75 TABLET ORAL at 06:24

## 2018-11-30 RX ADMIN — PANTOPRAZOLE SODIUM 40 MG: 40 TABLET, DELAYED RELEASE ORAL at 06:24

## 2018-11-30 RX ADMIN — FUROSEMIDE 40 MG: 10 INJECTION, SOLUTION INTRAMUSCULAR; INTRAVENOUS at 08:37

## 2018-11-30 RX ADMIN — FERROUS SULFATE TAB 325 MG (65 MG ELEMENTAL FE) 325 MG: 325 (65 FE) TAB at 08:38

## 2018-11-30 RX ADMIN — OXYCODONE HYDROCHLORIDE AND ACETAMINOPHEN 500 MG: 500 TABLET ORAL at 08:37

## 2018-11-30 ASSESSMENT — PAIN SCALES - GENERAL
PAINLEVEL_OUTOF10: 0

## 2018-11-30 NOTE — CONSULTS
[address]  Advanced CHF/Pulmonary Hypertension   Cardiac Evaluation      Robyn Rosas  YOB: 1934    REquesting PHysician:  Dr. Donna Blum      Chief Complaint   Patient presents with    Leg Pain     Patient reporting left leg swelling and pain starting yesterday. History of Present Illness:  Toñito Palma is an 81 yo female with a history of HTN, hyperlipidemia, pulmonary embolus, thyroid disease who presents to the ED with left posterior knee and calf pain with swelling for the past 2 days. She typically ambulates with a walker and is still able to ambulate. She was found to have hypoxia with oxygen saturations in the high 80's. She denies shortness of breath. Son is at bedside. He has not noticed her to be SOB. Since the PE was diagnosed, she has been on Xarelto. She denies daytime sleepiness. She denies arthritis. No history of connective tissue disease. She would like to go home. Echo:  11/29/18:   -Normal left ventricle size, wall thickness, and systolic function with an   estimated ejection fraction of 70%.  -No regional wall motion abnormalities are seen.   -Moderate aortic stenosis with a peak velocity of 3.02m/s and a mean   pressure gradient of 25 mmHg. The aortic valve area is estimated at 1.08   cm^2. No significant regurgitation noted.   -There is mild-to-moderate tricuspid regurgitation with a RVSP estimation of   70 mmHg.   -This is suggestive of severe pulmonary hypertension.   -Normal diastolic function.  E/e'=10.3    Allergies   Allergen Reactions    Naprosyn [Naproxen] Nausea Only     Current Facility-Administered Medications   Medication Dose Route Frequency Provider Last Rate Last Dose    [START ON 12/1/2018] furosemide (LASIX) injection 40 mg  40 mg Intravenous Daily Angela Rivera MD        amLODIPine (NORVASC) tablet 2.5 mg  2.5 mg Oral Daily Angela Rivera MD   2.5 mg at 11/30/18 0837    vitamin C (ASCORBIC ACID) tablet 500 mg  500 mg Oral Daily Angela Holley MD   500 mg at 11/30/18 5892    ferrous sulfate tablet 325 mg  325 mg Oral BID Angela Holley MD   325 mg at 11/30/18 8399    levothyroxine (SYNTHROID) tablet 75 mcg  75 mcg Oral Daily Angela Holley MD   75 mcg at 11/30/18 3646    pantoprazole (PROTONIX) tablet 40 mg  40 mg Oral QAM AC Angela Holley MD   40 mg at 11/30/18 2505    ramipril (ALTACE) capsule 10 mg  10 mg Oral Daily Angela Holley MD   10 mg at 11/30/18 9300    rivaroxaban (XARELTO) tablet 20 mg  20 mg Oral Daily Angela Holley MD   20 mg at 11/30/18 2899    sodium chloride flush 0.9 % injection 10 mL  10 mL Intravenous 2 times per day Angela Holley MD   10 mL at 11/30/18 3429    sodium chloride flush 0.9 % injection 10 mL  10 mL Intravenous PRN Angela Holley MD        magnesium hydroxide (MILK OF MAGNESIA) 400 MG/5ML suspension 30 mL  30 mL Oral Daily PRN Angela Holley MD        ondansetron [facility]) injection 4 mg  4 mg Intravenous Q6H PRN Angela Holley MD           Past Medical History:   Diagnosis Date    Arthritis     Hyperlipidemia     Hypertension     Hypothyroidism     Movement disorder     scoliosis    Pulmonary emboli ([facility] [address] 12/2014    Scoliosis     Scoliosis     Thyroid disease     hypothyroid     Past Surgical History:   Procedure Laterality Date    COLONOSCOPY  7/08    JOINT REPLACEMENT      both knees     Family History   Problem Relation Age of Onset    High Cholesterol Mother     High Cholesterol Father     Cancer Brother      Social History     Social History    Marital status:      Spouse name: N/A    Number of children: 6    Years of education: 12     Occupational History    Not on file.      Social History Main Topics    Smoking status: Former Smoker     Packs/day: 0.25     Years: 15.00    Smokeless tobacco: Former User     Quit date: 12/27/1965    Alcohol use No    Drug use: No    Sexual activity: Not Currently     Other Topics Concern    Not on file     Social History Narrative    No narrative on file       Review of Systems:   · Constitutional: there has been no unanticipated weight loss. There's been no change in energy level, sleep pattern, or activity level. · Eyes: No visual changes or diplopia. No scleral icterus. · ENT: No Headaches, hearing loss or vertigo. No mouth sores or sore throat. · Cardiovascular: Reviewed in HPI  · Respiratory: No cough or wheezing, no sputum production. No hematemesis. · Gastrointestinal: No abdominal pain, appetite loss, blood in stools. No change in bowel or bladder habits. · Genitourinary: No dysuria, trouble voiding, or hematuria. · Musculoskeletal:  No gait disturbance, weakness or joint complaints. · Integumentary: No rash or pruritis. · Neurological: No headache, diplopia, change in muscle strength, numbness or tingling. No change in gait, balance, coordination, mood, affect, memory, mentation, behavior. · Psychiatric: No anxiety, no depression. · Endocrine: No malaise, fatigue or temperature intolerance. No excessive thirst, fluid intake, or urination. No tremor. · Hematologic/Lymphatic: No abnormal bruising or bleeding, blood clots or swollen lymph nodes. · Allergic/Immunologic: No nasal congestion or hives. Physical Examination:    Vitals:    11/30/18 0615 11/30/18 0815 11/30/18 0824 11/30/18 1130   BP:  107/69  (!) 91/52   Pulse:  75  73   Resp:  16  16   Temp:  99 °F (37.2 °C)  98.3 °F (36.8 °C)   TempSrc:  Temporal  Oral   SpO2:  97% 98% 96%   Weight: 160 lb 9.6 oz (72.8 kg)      Height:         Body mass index is 29.37 kg/m².      Wt Readings from Last 3 Encounters:   11/30/18 160 lb 9.6 oz (72.8 kg)   11/08/18 130 lb (59 kg)   10/19/18 145 lb (65.8 kg)     BP Readings from Last 3 Encounters:   11/30/18 (!) 91/52   11/08/18 122/60   10/19/18 120/80     Constitutional and General Appearance:   WD/WN in NAD, wearing

## 2018-12-01 LAB
C-REACTIVE PROTEIN: 7 MG/L (ref 0–5.1)
RHEUMATOID FACTOR: <10 IU/ML
SEDIMENTATION RATE, ERYTHROCYTE: 29 MM/HR (ref 0–30)

## 2018-12-01 PROCEDURE — 86431 RHEUMATOID FACTOR QUANT: CPT

## 2018-12-01 PROCEDURE — 86038 ANTINUCLEAR ANTIBODIES: CPT

## 2018-12-01 PROCEDURE — 99232 SBSQ HOSP IP/OBS MODERATE 35: CPT | Performed by: INTERNAL MEDICINE

## 2018-12-01 PROCEDURE — 1200000000 HC SEMI PRIVATE

## 2018-12-01 PROCEDURE — 2580000003 HC RX 258: Performed by: INTERNAL MEDICINE

## 2018-12-01 PROCEDURE — 6370000000 HC RX 637 (ALT 250 FOR IP): Performed by: INTERNAL MEDICINE

## 2018-12-01 PROCEDURE — 36415 COLL VENOUS BLD VENIPUNCTURE: CPT

## 2018-12-01 PROCEDURE — 85652 RBC SED RATE AUTOMATED: CPT

## 2018-12-01 PROCEDURE — 86140 C-REACTIVE PROTEIN: CPT

## 2018-12-01 RX ADMIN — FERROUS SULFATE TAB 325 MG (65 MG ELEMENTAL FE) 325 MG: 325 (65 FE) TAB at 08:48

## 2018-12-01 RX ADMIN — OXYCODONE HYDROCHLORIDE AND ACETAMINOPHEN 500 MG: 500 TABLET ORAL at 08:48

## 2018-12-01 RX ADMIN — FUROSEMIDE 20 MG: 20 TABLET ORAL at 08:47

## 2018-12-01 RX ADMIN — Medication 10 ML: at 19:55

## 2018-12-01 RX ADMIN — RIVAROXABAN 20 MG: 20 TABLET, FILM COATED ORAL at 08:48

## 2018-12-01 RX ADMIN — PANTOPRAZOLE SODIUM 40 MG: 40 TABLET, DELAYED RELEASE ORAL at 05:40

## 2018-12-01 RX ADMIN — LEVOTHYROXINE SODIUM 75 MCG: 75 TABLET ORAL at 05:40

## 2018-12-01 RX ADMIN — Medication 10 ML: at 08:50

## 2018-12-01 RX ADMIN — FERROUS SULFATE TAB 325 MG (65 MG ELEMENTAL FE) 325 MG: 325 (65 FE) TAB at 19:55

## 2018-12-01 ASSESSMENT — PAIN SCALES - GENERAL
PAINLEVEL_OUTOF10: 0

## 2018-12-01 NOTE — PROGRESS NOTES
Admit Date: 11/28/2018  Hospital day 3    Subjective:     Chief complaint: Hypoxia    Interval history: Denies any dyspnea, currently requiring 2 L O2. No chest pain. Hemodynamically stable. Scheduled Meds:   furosemide  40 mg Intravenous Daily    furosemide  20 mg Oral Daily    amLODIPine  2.5 mg Oral Daily    vitamin C  500 mg Oral Daily    ferrous sulfate  325 mg Oral BID    levothyroxine  75 mcg Oral Daily    pantoprazole  40 mg Oral QAM AC    ramipril  10 mg Oral Daily    rivaroxaban  20 mg Oral Daily    sodium chloride flush  10 mL Intravenous 2 times per day     Continuous Infusions:  PRN Meds:sodium chloride flush, sodium chloride flush, magnesium hydroxide, ondansetron    Review of Systems  As per interval history and the rest of the 14 point systems were sought and noted to be negative    Objective:     Patient Vitals for the past 8 hrs:   BP Temp Temp src Pulse Resp SpO2   12/01/18 1157 (!) 94/57 98.4 °F (36.9 °C) Temporal 88 16 93 %   12/01/18 0800 (!) 99/59 98.6 °F (37 °C) Temporal 87 16 93 %     No intake/output data recorded. No intake/output data recorded.     BP (!) 94/57   Pulse 88   Temp 98.4 °F (36.9 °C) (Temporal)   Resp 16   Ht 5' 2\" (1.575 m)   Wt 161 lb 12.8 oz (73.4 kg)   SpO2 93%   BMI 29.59 kg/m²     General Appearance:    Alert, cooperative, no distress, appears stated age   Head:    Normocephalic, without obvious abnormality, atraumatic   Eyes:    PERRL, conjunctiva/corneas clear, EOM's intact, fundi     benign, both eyes   Ears:    Normal TM's and external ear canals, both ears   Nose:   Nares normal, septum midline, mucosa normal, no drainage    or sinus tenderness   Throat:   Lips, mucosa, and tongue normal; teeth and gums normal   Neck:   Supple, symmetrical, trachea midline, no adenopathy;     thyroid:  no enlargement/tenderness/nodules; no carotid    bruit or JVD   Back:     Symmetric, no curvature, ROM normal, no CVA tenderness   Lungs:     Decreased air

## 2018-12-01 NOTE — PLAN OF CARE
Problem: Falls - Risk of:  Goal: Will remain free from falls  Will remain free from falls   Outcome: Ongoing  Fall risk precautions in place. Bed wheels locked and in lowest position. Bed alarm on. Non-skid footwear applied. Instructed pt to call with needs or for assistance. Expressed understanding. Call light and personal belongings within reach. Will continue to monitor. Problem: Respiratory  Goal: O2 Sat > 90%  Outcome: Ongoing  O2 sats remain >90% on 2L nasal cannula. Pt denies SOB or dyspnea so far this shift. Pulmonology and cardiology on board. Will continue to monitor.

## 2018-12-02 ENCOUNTER — APPOINTMENT (OUTPATIENT)
Dept: GENERAL RADIOLOGY | Age: 83
DRG: 175 | End: 2018-12-02
Payer: MEDICARE

## 2018-12-02 ENCOUNTER — APPOINTMENT (OUTPATIENT)
Dept: CT IMAGING | Age: 83
DRG: 175 | End: 2018-12-02
Payer: MEDICARE

## 2018-12-02 LAB
ANION GAP SERPL CALCULATED.3IONS-SCNC: 11 MMOL/L (ref 3–16)
BASE EXCESS ARTERIAL: 11 MMOL/L (ref -3–3)
BASOPHILS ABSOLUTE: 0.1 K/UL (ref 0–0.2)
BASOPHILS RELATIVE PERCENT: 0.8 %
BUN BLDV-MCNC: 22 MG/DL (ref 7–20)
CALCIUM SERPL-MCNC: 9.3 MG/DL (ref 8.3–10.6)
CARBOXYHEMOGLOBIN ARTERIAL: 1.5 % (ref 0–1.5)
CHLORIDE BLD-SCNC: 98 MMOL/L (ref 99–110)
CO2: 32 MMOL/L (ref 21–32)
CREAT SERPL-MCNC: 0.9 MG/DL (ref 0.6–1.2)
EOSINOPHILS ABSOLUTE: 0.2 K/UL (ref 0–0.6)
EOSINOPHILS RELATIVE PERCENT: 1.8 %
GFR AFRICAN AMERICAN: >60
GFR NON-AFRICAN AMERICAN: 60
GLUCOSE BLD-MCNC: 109 MG/DL (ref 70–99)
GLUCOSE BLD-MCNC: 109 MG/DL (ref 70–99)
HCO3 ARTERIAL: 35.3 MMOL/L (ref 21–29)
HCT VFR BLD CALC: 38.7 % (ref 36–48)
HEMOGLOBIN, ART, EXTENDED: 12 G/DL (ref 12–16)
HEMOGLOBIN: 12.2 G/DL (ref 12–16)
LYMPHOCYTES ABSOLUTE: 1.5 K/UL (ref 1–5.1)
LYMPHOCYTES RELATIVE PERCENT: 16.6 %
MCH RBC QN AUTO: 25.7 PG (ref 26–34)
MCHC RBC AUTO-ENTMCNC: 31.5 G/DL (ref 31–36)
MCV RBC AUTO: 81.6 FL (ref 80–100)
METHEMOGLOBIN ARTERIAL: 0.1 %
MONOCYTES ABSOLUTE: 1 K/UL (ref 0–1.3)
MONOCYTES RELATIVE PERCENT: 11 %
NEUTROPHILS ABSOLUTE: 6.3 K/UL (ref 1.7–7.7)
NEUTROPHILS RELATIVE PERCENT: 69.8 %
O2 CONTENT ARTERIAL: 16 ML/DL
O2 SAT, ARTERIAL: 96.7 %
O2 THERAPY: ABNORMAL
PCO2 ARTERIAL: 44 MMHG (ref 35–45)
PDW BLD-RTO: 20 % (ref 12.4–15.4)
PERFORMED ON: ABNORMAL
PH ARTERIAL: 7.51 (ref 7.35–7.45)
PLATELET # BLD: 351 K/UL (ref 135–450)
PMV BLD AUTO: 8.9 FL (ref 5–10.5)
PO2 ARTERIAL: 76.9 MMHG (ref 75–108)
POTASSIUM SERPL-SCNC: 3.8 MMOL/L (ref 3.5–5.1)
RBC # BLD: 4.75 M/UL (ref 4–5.2)
SODIUM BLD-SCNC: 141 MMOL/L (ref 136–145)
TCO2 ARTERIAL: 82.1 MMOL/L
WBC # BLD: 9 K/UL (ref 4–11)

## 2018-12-02 PROCEDURE — 70450 CT HEAD/BRAIN W/O DYE: CPT

## 2018-12-02 PROCEDURE — 6370000000 HC RX 637 (ALT 250 FOR IP): Performed by: INTERNAL MEDICINE

## 2018-12-02 PROCEDURE — 6360000002 HC RX W HCPCS: Performed by: INTERNAL MEDICINE

## 2018-12-02 PROCEDURE — 85025 COMPLETE CBC W/AUTO DIFF WBC: CPT

## 2018-12-02 PROCEDURE — 2700000000 HC OXYGEN THERAPY PER DAY

## 2018-12-02 PROCEDURE — 94760 N-INVAS EAR/PLS OXIMETRY 1: CPT

## 2018-12-02 PROCEDURE — 99232 SBSQ HOSP IP/OBS MODERATE 35: CPT | Performed by: INTERNAL MEDICINE

## 2018-12-02 PROCEDURE — 36415 COLL VENOUS BLD VENIPUNCTURE: CPT

## 2018-12-02 PROCEDURE — 80048 BASIC METABOLIC PNL TOTAL CA: CPT

## 2018-12-02 PROCEDURE — 71045 X-RAY EXAM CHEST 1 VIEW: CPT

## 2018-12-02 PROCEDURE — 82803 BLOOD GASES ANY COMBINATION: CPT

## 2018-12-02 PROCEDURE — 2580000003 HC RX 258: Performed by: INTERNAL MEDICINE

## 2018-12-02 PROCEDURE — 36600 WITHDRAWAL OF ARTERIAL BLOOD: CPT

## 2018-12-02 PROCEDURE — 1200000000 HC SEMI PRIVATE

## 2018-12-02 RX ADMIN — PANTOPRAZOLE SODIUM 40 MG: 40 TABLET, DELAYED RELEASE ORAL at 06:12

## 2018-12-02 RX ADMIN — Medication 10 ML: at 08:31

## 2018-12-02 RX ADMIN — FERROUS SULFATE TAB 325 MG (65 MG ELEMENTAL FE) 325 MG: 325 (65 FE) TAB at 21:11

## 2018-12-02 RX ADMIN — OXYCODONE HYDROCHLORIDE AND ACETAMINOPHEN 500 MG: 500 TABLET ORAL at 08:30

## 2018-12-02 RX ADMIN — LEVOTHYROXINE SODIUM 75 MCG: 75 TABLET ORAL at 06:12

## 2018-12-02 RX ADMIN — FERROUS SULFATE TAB 325 MG (65 MG ELEMENTAL FE) 325 MG: 325 (65 FE) TAB at 08:30

## 2018-12-02 RX ADMIN — RIVAROXABAN 20 MG: 20 TABLET, FILM COATED ORAL at 08:30

## 2018-12-02 RX ADMIN — Medication 10 ML: at 21:12

## 2018-12-02 RX ADMIN — FUROSEMIDE 40 MG: 10 INJECTION, SOLUTION INTRAMUSCULAR; INTRAVENOUS at 10:23

## 2018-12-02 ASSESSMENT — PAIN SCALES - GENERAL
PAINLEVEL_OUTOF10: 0

## 2018-12-02 NOTE — PROGRESS NOTES
Shift assessment complete. VSS. Pt denies any pain. Call light within reach, non skid socks on, bed alarm engaged. Pt states she cannot tell when she has urinated in brief. Will check brief Q2 and change as needed. Barrier cream applied. No needs at this time. Will continue to monitor.

## 2018-12-02 NOTE — PROGRESS NOTES
[facility] PROGRESS NOTE    12/1/2018 10:12 PM        Name: Dang Dc . Admitted: 11/28/2018  Primary Care Provider: Ashlee Morales MD (Tel: [phone])      Subjective:  .    Requiring 2 L O2 va NC  No chest pain no fever    Reviewed interval ancillary notes    Current Medications    furosemide (LASIX) injection 40 mg Daily   sodium chloride flush 0.9 % injection 10 mL PRN   amLODIPine (NORVASC) tablet 2.5 mg Daily   vitamin C (ASCORBIC ACID) tablet 500 mg Daily   ferrous sulfate tablet 325 mg BID   levothyroxine (SYNTHROID) tablet 75 mcg Daily   pantoprazole (PROTONIX) tablet 40 mg QAM AC   ramipril (ALTACE) capsule 10 mg Daily   rivaroxaban (XARELTO) tablet 20 mg Daily   sodium chloride flush 0.9 % injection 10 mL 2 times per day   sodium chloride flush 0.9 % injection 10 mL PRN   magnesium hydroxide (MILK OF MAGNESIA) 400 MG/5ML suspension 30 mL Daily PRN   ondansetron (ZOFRAN) injection 4 mg Q6H PRN       Objective:  BP 94/64   Pulse 103   Temp 98.3 °F (36.8 °C) (Temporal)   Resp 16   Ht 5' 2\" (1.575 m)   Wt 161 lb 12.8 oz (73.4 kg)   SpO2 93%   BMI 29.59 kg/m²   No intake or output data in the 24 hours ending 12/01/18 2212 Wt Readings from Last 3 Encounters:   12/01/18 161 lb 12.8 oz (73.4 kg)   11/08/18 130 lb (59 kg)   10/19/18 145 lb (65.8 kg)       General appearance:  Appears comfortable  Eyes: Sclera clear. Pupils equal.  ENT: Moist oral mucosa. Trachea midline, no adenopathy. Cardiovascular: Regular rhythm, normal S1, S2. No murmur. No edema in lower extremities  Respiratory: Not using accessory muscles. Good inspiratory effort. Clear to auscultation bilaterally, no wheeze or crackles. GI: Abdomen soft, no tenderness, not distended, normal bowel sounds  Musculoskeletal: No cyanosis in digits, neck supple  Neurology: CN 2-12 grossly intact. No speech or motor deficits  Psych: Normal affect.

## 2018-12-03 LAB
ANA INTERPRETATION: NORMAL
ANION GAP SERPL CALCULATED.3IONS-SCNC: 11 MMOL/L (ref 3–16)
ANTI-NUCLEAR ANTIBODY (ANA): NEGATIVE
BASOPHILS ABSOLUTE: 0.1 K/UL (ref 0–0.2)
BASOPHILS RELATIVE PERCENT: 1 %
BLOOD CULTURE, ROUTINE: NORMAL
BUN BLDV-MCNC: 22 MG/DL (ref 7–20)
CALCIUM SERPL-MCNC: 9.1 MG/DL (ref 8.3–10.6)
CHLORIDE BLD-SCNC: 100 MMOL/L (ref 99–110)
CO2: 32 MMOL/L (ref 21–32)
CREAT SERPL-MCNC: 1 MG/DL (ref 0.6–1.2)
D DIMER: <200 NG/ML DDU (ref 0–229)
EOSINOPHILS ABSOLUTE: 0.3 K/UL (ref 0–0.6)
EOSINOPHILS RELATIVE PERCENT: 3.1 %
GFR AFRICAN AMERICAN: >60
GFR NON-AFRICAN AMERICAN: 53
GLUCOSE BLD-MCNC: 118 MG/DL (ref 70–99)
HCT VFR BLD CALC: 38.4 % (ref 36–48)
HEMOGLOBIN: 12 G/DL (ref 12–16)
INR BLD: 1.81 (ref 0.86–1.14)
LYMPHOCYTES ABSOLUTE: 1.6 K/UL (ref 1–5.1)
LYMPHOCYTES RELATIVE PERCENT: 20 %
MAGNESIUM: 2.4 MG/DL (ref 1.8–2.4)
MCH RBC QN AUTO: 25.5 PG (ref 26–34)
MCHC RBC AUTO-ENTMCNC: 31.2 G/DL (ref 31–36)
MCV RBC AUTO: 81.6 FL (ref 80–100)
MONOCYTES ABSOLUTE: 0.9 K/UL (ref 0–1.3)
MONOCYTES RELATIVE PERCENT: 11.6 %
NEUTROPHILS ABSOLUTE: 5.3 K/UL (ref 1.7–7.7)
NEUTROPHILS RELATIVE PERCENT: 64.3 %
PDW BLD-RTO: 20.1 % (ref 12.4–15.4)
PLATELET # BLD: 375 K/UL (ref 135–450)
PMV BLD AUTO: 9.4 FL (ref 5–10.5)
POTASSIUM REFLEX MAGNESIUM: 3.1 MMOL/L (ref 3.5–5.1)
PROTHROMBIN TIME: 20.6 SEC (ref 9.8–13)
RBC # BLD: 4.71 M/UL (ref 4–5.2)
SODIUM BLD-SCNC: 143 MMOL/L (ref 136–145)
WBC # BLD: 8.2 K/UL (ref 4–11)

## 2018-12-03 PROCEDURE — 6370000000 HC RX 637 (ALT 250 FOR IP): Performed by: INTERNAL MEDICINE

## 2018-12-03 PROCEDURE — 6370000000 HC RX 637 (ALT 250 FOR IP): Performed by: HOSPITALIST

## 2018-12-03 PROCEDURE — 6360000002 HC RX W HCPCS: Performed by: INTERNAL MEDICINE

## 2018-12-03 PROCEDURE — 80048 BASIC METABOLIC PNL TOTAL CA: CPT

## 2018-12-03 PROCEDURE — 99232 SBSQ HOSP IP/OBS MODERATE 35: CPT | Performed by: INTERNAL MEDICINE

## 2018-12-03 PROCEDURE — 85025 COMPLETE CBC W/AUTO DIFF WBC: CPT

## 2018-12-03 PROCEDURE — 95816 EEG AWAKE AND DROWSY: CPT

## 2018-12-03 PROCEDURE — 6360000002 HC RX W HCPCS: Performed by: HOSPITALIST

## 2018-12-03 PROCEDURE — 36415 COLL VENOUS BLD VENIPUNCTURE: CPT

## 2018-12-03 PROCEDURE — 1200000000 HC SEMI PRIVATE

## 2018-12-03 PROCEDURE — 95816 EEG AWAKE AND DROWSY: CPT | Performed by: PSYCHIATRY & NEUROLOGY

## 2018-12-03 PROCEDURE — 83735 ASSAY OF MAGNESIUM: CPT

## 2018-12-03 PROCEDURE — 85610 PROTHROMBIN TIME: CPT

## 2018-12-03 PROCEDURE — 99233 SBSQ HOSP IP/OBS HIGH 50: CPT | Performed by: INTERNAL MEDICINE

## 2018-12-03 PROCEDURE — 99223 1ST HOSP IP/OBS HIGH 75: CPT | Performed by: PSYCHIATRY & NEUROLOGY

## 2018-12-03 PROCEDURE — 2580000003 HC RX 258: Performed by: INTERNAL MEDICINE

## 2018-12-03 PROCEDURE — 93880 EXTRACRANIAL BILAT STUDY: CPT

## 2018-12-03 PROCEDURE — 85379 FIBRIN DEGRADATION QUANT: CPT

## 2018-12-03 RX ORDER — RAMIPRIL 5 MG/1
5 CAPSULE ORAL DAILY
Status: DISCONTINUED | OUTPATIENT
Start: 2018-12-04 | End: 2018-12-04

## 2018-12-03 RX ORDER — POTASSIUM CHLORIDE 20 MEQ/1
20 TABLET, EXTENDED RELEASE ORAL 2 TIMES DAILY WITH MEALS
Status: DISCONTINUED | OUTPATIENT
Start: 2018-12-03 | End: 2018-12-04

## 2018-12-03 RX ORDER — WARFARIN SODIUM 5 MG/1
5 TABLET ORAL DAILY
Status: DISCONTINUED | OUTPATIENT
Start: 2018-12-03 | End: 2018-12-05 | Stop reason: HOSPADM

## 2018-12-03 RX ORDER — AMLODIPINE BESYLATE 5 MG/1
2.5 TABLET ORAL NIGHTLY
Status: DISCONTINUED | OUTPATIENT
Start: 2018-12-04 | End: 2018-12-05 | Stop reason: HOSPADM

## 2018-12-03 RX ORDER — POTASSIUM CHLORIDE 7.45 MG/ML
10 INJECTION INTRAVENOUS
Status: COMPLETED | OUTPATIENT
Start: 2018-12-03 | End: 2018-12-03

## 2018-12-03 RX ORDER — FUROSEMIDE 20 MG/1
20 TABLET ORAL DAILY
Status: DISCONTINUED | OUTPATIENT
Start: 2018-12-04 | End: 2018-12-04

## 2018-12-03 RX ORDER — SPIRONOLACTONE 25 MG/1
12.5 TABLET ORAL DAILY
Status: DISCONTINUED | OUTPATIENT
Start: 2018-12-03 | End: 2018-12-05 | Stop reason: HOSPADM

## 2018-12-03 RX ORDER — LORAZEPAM 2 MG/ML
1 INJECTION INTRAMUSCULAR ONCE
Status: DISCONTINUED | OUTPATIENT
Start: 2018-12-03 | End: 2018-12-03

## 2018-12-03 RX ADMIN — Medication 10 ML: at 08:38

## 2018-12-03 RX ADMIN — AMLODIPINE BESYLATE 2.5 MG: 5 TABLET ORAL at 08:37

## 2018-12-03 RX ADMIN — FERROUS SULFATE TAB 325 MG (65 MG ELEMENTAL FE) 325 MG: 325 (65 FE) TAB at 08:38

## 2018-12-03 RX ADMIN — PANTOPRAZOLE SODIUM 40 MG: 40 TABLET, DELAYED RELEASE ORAL at 07:55

## 2018-12-03 RX ADMIN — Medication 10 ML: at 20:06

## 2018-12-03 RX ADMIN — POTASSIUM CHLORIDE 10 MEQ: 7.46 INJECTION, SOLUTION INTRAVENOUS at 09:00

## 2018-12-03 RX ADMIN — POTASSIUM CHLORIDE 10 MEQ: 7.46 INJECTION, SOLUTION INTRAVENOUS at 08:43

## 2018-12-03 RX ADMIN — FERROUS SULFATE TAB 325 MG (65 MG ELEMENTAL FE) 325 MG: 325 (65 FE) TAB at 20:06

## 2018-12-03 RX ADMIN — FUROSEMIDE 40 MG: 10 INJECTION, SOLUTION INTRAMUSCULAR; INTRAVENOUS at 08:37

## 2018-12-03 RX ADMIN — SPIRONOLACTONE 12.5 MG: 25 TABLET ORAL at 20:06

## 2018-12-03 RX ADMIN — POTASSIUM CHLORIDE 20 MEQ: 1500 TABLET, EXTENDED RELEASE ORAL at 17:06

## 2018-12-03 RX ADMIN — OXYCODONE HYDROCHLORIDE AND ACETAMINOPHEN 500 MG: 500 TABLET ORAL at 08:38

## 2018-12-03 RX ADMIN — RAMIPRIL 10 MG: 5 CAPSULE ORAL at 08:38

## 2018-12-03 RX ADMIN — RIVAROXABAN 20 MG: 20 TABLET, FILM COATED ORAL at 08:38

## 2018-12-03 RX ADMIN — LEVOTHYROXINE SODIUM 75 MCG: 75 TABLET ORAL at 07:55

## 2018-12-03 RX ADMIN — WARFARIN SODIUM 5 MG: 5 TABLET ORAL at 18:45

## 2018-12-03 RX ADMIN — POTASSIUM CHLORIDE 20 MEQ: 1500 TABLET, EXTENDED RELEASE ORAL at 08:37

## 2018-12-03 ASSESSMENT — PAIN SCALES - GENERAL
PAINLEVEL_OUTOF10: 0

## 2018-12-03 NOTE — CONSULTS
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 History of Present Illness:  Anabell Finley is a 80 y.o. female on Kathlynn Hodgkin, MD service who was admitted on 2018 for left lower extremity pain and swelling. She also had hypoxia on admission. CTPA and LLE doppler negative for PE/DVT. A VQ scan was done due to persistent hypoxia and dyspnea on exertion which showed a high probability of PE. 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 She has a history of pulmonary embolism in  which was thought to be provoked from long distance travel. She has been on Xarelto since. She has 8 children. She had one miscarriage between her fourth and fifth child delivery. She has never had hypercoagulable workup. She denies personal history of blood clotting disorders and cancer. She denies family history of blood clotting disorders, her brother  of cholangiocarcinoma. Past Medical History:     has a past medical history of Arthritis; Hyperlipidemia; Hypertension; Hypothyroidism; Movement disorder; Pulmonary emboli ([address] Scoliosis; Scoliosis; and Thyroid disease.      Past Surgical History:    Past Surgical History:   Procedure Laterality Date    COLONOSCOPY      JOINT REPLACEMENT      both knees        Current Medications:     potassium chloride  20 mEq Oral BID WC    furosemide  40 mg Intravenous Daily    amLODIPine  2.5 mg Oral Daily    vitamin C  500 mg Oral Daily    ferrous sulfate  325 mg Oral BID    levothyroxine

## 2018-12-03 NOTE — CONSULTS
NEUROLOGY CONSULTATION     Patient's Name :   London Watson        YOB: 1934                    Date of Consultation : 12/3/2018 9:56 AM    Referring Physician :  Karen Tang MD    Reason for Consultation :    Transient confusion    HISTORY OF PRESENT ILLNESS :    Greta Santiago is a 80 y.o. female   History was obtained from patient and from dictations in the chart. Patient was admitted with hypoxia. Patient had an episode yesterday morning in which she was difficult to arouse. Finally when she was aroused, patient was confused and had some slurred speech. This lasted for several minutes and then slowly improved. There was some concern for a TIA versus stroke. Patient had a CT head which did not show any acute changes. Patient has done well since that time. I was asked to see her for a neurological consultation. REVIEW OF SYSTEMS  Patient has had some breathlessness. Denies any chest pain palpitations abdominal pain fever or weight loss. Rest of the 14 system review was reviewed and was negative except for the symptoms noted above. Past Medical History:   Diagnosis Date    Arthritis     Hyperlipidemia     Hypertension     Hypothyroidism     Movement disorder     scoliosis    Pulmonary emboli ([facility] [address] 12/2014    Scoliosis     Scoliosis     Thyroid disease     hypothyroid     Family History   Problem Relation Age of Onset    High Cholesterol Mother     High Cholesterol Father     Cancer Brother      Social History     Social History    Marital status:       Spouse name: N/A    Number of children: 8    Years of education: 12     Social History Main Topics    Smoking status: Former Smoker     Packs/day: 0.25     Years: 15.00    Smokeless tobacco: Former User     Quit date: 12/27/1965    Alcohol use No    Drug use: No    Sexual activity: Not Currently     Other Topics Concern    None     Social PHYSICAL EXAMINATION:  /67   Pulse 91   Temp 98.2 °F (36.8 °C) (Oral)   Resp 16   Ht 5' 2\" (1.575 m)   Wt 160 lb 11.2 oz (72.9 kg) Comment: 2 pillows, sheet, blanket, call light  SpO2 92%   BMI 29.39 kg/m²   Appearance: Well appearing, well nourished and in no distress  Mental Status Exam: Patient is alert, oriented to person, place and time. Recent and remote memory is normal  Fund of Knowledge is normal  Attention/concentration is normal.   Speech : No dysarthria  Language : No aphasia  Funduscopic Exam: sharp disc margins  Cranial Nerves:   II: Visual fields:  Full to confrontation  III: Pupils:  equal, round, reactive to light  III,IV,VI: Extra Ocular Movements are intact. No nystagmus  V: Facial sensation is intact to pin prick and light touch  VII: Facial strength and movements: intact and symmetric smile,cheek puffing and eyebrow elevation  VIII: Hearing:  Intact to finger rub bilaterally  IX: Palate  elevation is symmetric  XI: Shoulder shrug is intact  XII: Tongue movements are normal  Motor:  Muscle tone and bulk are normal.   Strength is symmetrical 4+ /5 in all four extremities. Sensory: Intact to light touch and  pin prick in all four extremities  Coordination:  Normal  Finger to Nose and Heel to Shin bilaterally    . Reflexes:  DTR 1 and symmetric bilaterally  Plantar response: Flexor bilaterally  Gait: Gait is impaired and patient uses a walker to ambulate Romberg: negative  Vascular: No carotid bruit bilaterally        DATA:  LABS:  General Labs:    CBC:   Lab Results   Component Value Date    WBC 8.2 12/03/2018    RBC 4.71 12/03/2018    HGB 12.0 12/03/2018    HCT 38.4 12/03/2018    MCV 81.6 12/03/2018    MCH 25.5 12/03/2018    MCHC 31.2 12/03/2018    RDW 20.1 12/03/2018     12/03/2018    MPV 9.4 12/03/2018     BMP:    Lab Results   Component Value Date     12/03/2018    K 3.1 12/03/2018     12/03/2018    CO2 32 12/03/2018    BUN 22 12/03/2018    LABALBU 3.8

## 2018-12-03 NOTE — PROCEDURES
[facility] [address]                     [facility], [address]                          ELECTROENCEPHALOGRAM REPORT    PATIENT NAME: Rocco Cooks                      :        1934  MED REC NO:   [id number]                          ROOM:       3327  ACCOUNT NO:   [de-identified]                           ADMIT DATE: 2018  PROVIDER:     Vandana Burciaga MD    DATE OF EE2018    EEG FINDINGS:  The background rhythm in this recording consists of  well-developed and well-organized waveforms of 8-10 Hz frequency which  are bilaterally synchronous and symmetrical.  No focal abnormalities  were noted. No spikes or sharp waves were seen. Hyperventilation was  not performed. Photic stimulation did not produce any photic drive. The recording during drowsiness was normal.    IMPRESSION:  This EEG obtained during the awake state and drowsiness is  within normal limits.         Shona Shipley MD    D: 2018 15:19:11       T: 2018 16:23:16     [id number]  Job#: [id number]     Doc#: [id number]    CC:

## 2018-12-03 NOTE — PROGRESS NOTES
Admit Date: 11/28/2018  Hospital day 5    Subjective:     Chief complaint: Hypoxia    Interval history: No altered mental status noted today. Continued hypoxia on 2L O2. No fevers or chills. No chest pain. Scheduled Meds:   potassium chloride  20 mEq Oral BID WC    furosemide  40 mg Intravenous Daily    amLODIPine  2.5 mg Oral Daily    vitamin C  500 mg Oral Daily    ferrous sulfate  325 mg Oral BID    levothyroxine  75 mcg Oral Daily    pantoprazole  40 mg Oral QAM AC    ramipril  10 mg Oral Daily    rivaroxaban  20 mg Oral Daily    sodium chloride flush  10 mL Intravenous 2 times per day     Continuous Infusions:  PRN Meds:sodium chloride flush, sodium chloride flush, magnesium hydroxide, ondansetron    Review of Systems  As per interval history and the rest of the 14 point systems were sought and noted to be negative    Objective:     Patient Vitals for the past 8 hrs:   BP Temp Temp src Pulse Resp SpO2 Weight   12/03/18 1000 - - - 86 - - -   12/03/18 0758 110/67 98.2 °F (36.8 °C) Oral 91 16 92 % -   12/03/18 0426 - - - - - - 160 lb 11.2 oz (72.9 kg)   12/03/18 0414 109/71 98.4 °F (36.9 °C) Oral 90 18 93 % -   12/03/18 0413 - - - - - (!) 84 % -     I/O last 3 completed shifts: In: 360 [P.O.:360]  Out: 0   No intake/output data recorded.     /67   Pulse 86   Temp 98.2 °F (36.8 °C) (Oral)   Resp 16   Ht 5' 2\" (1.575 m)   Wt 160 lb 11.2 oz (72.9 kg) Comment: 2 pillows, sheet, blanket, call light  SpO2 92%   BMI 29.39 kg/m²     General Appearance:    Alert, cooperative, no distress, appears stated age   Head:    Normocephalic, without obvious abnormality, atraumatic   Eyes:    PERRL, conjunctiva/corneas clear, EOM's intact, fundi     benign, both eyes   Ears:    Normal TM's and external ear canals, both ears   Nose:   Nares normal, septum midline, mucosa normal, no drainage    or sinus tenderness   Throat:   Lips, mucosa, and tongue normal; teeth and gums normal   Neck:   Supple, symmetrical, trachea midline, no adenopathy;     thyroid:  no enlargement/tenderness/nodules; no carotid    bruit or JVD   Back:     Symmetric, no curvature, ROM normal, no CVA tenderness   Lungs:     Decreased air entry bilaterally, no wheezes or crackles heard    Chest Wall:    No tenderness or deformity    Heart:    Regular rate and rhythm, S1 and S2 normal, no murmur, rub   or gallop       Abdomen:     Soft, non-tender, bowel sounds active all four quadrants,     no masses, no organomegaly           Extremities:   Extremities normal, atraumatic, no cyanosis or edema   Pulses:   2+ and symmetric all extremities   Skin:   Skin color, texture, turgor normal, no rashes or lesions       Neurologic:   CNII-XII intact, normal strength, sensation and reflexes     throughout     Data Review  CBC:   Lab Results   Component Value Date    WBC 8.2 12/03/2018    RBC 4.71 12/03/2018     BMP:   Lab Results   Component Value Date    GLUCOSE 118 12/03/2018    GLUCOSE 103 10/05/2011    CO2 32 12/03/2018    BUN 22 12/03/2018    CREATININE 1.0 12/03/2018    CALCIUM 9.1 12/03/2018     ABG:   Lab Results   Component Value Date    GSP8QFY 35.3 12/02/2018    BEART 11.0 12/02/2018    F2HEMSIM 96.7 12/02/2018    PHART 7.512 12/02/2018    OOH4UAX 44.0 12/02/2018    PO2ART 76.9 12/02/2018    SVU2KGF 82.1 12/02/2018       Assessment:     CTEPH  Chronic hypoxic respiratory failure    Plan:     Possible CTEPH, based on VQ scan. Had an episode of poor responsiveness/altered mental status yesterday ? TIA. No residual confusion or weakness noted at this time. ABG suggestive of mild respiratory alkalosis only. On 2 L O2 which we will continue. Patient continues on xeralto. Await for cardiology review ? Pulmonary angiogram for CTEPH. ? Treat with riociguat. Possibly home today after cards review.

## 2018-12-04 ENCOUNTER — TELEPHONE (OUTPATIENT)
Dept: PHARMACY | Age: 83
End: 2018-12-04

## 2018-12-04 ENCOUNTER — TELEPHONE (OUTPATIENT)
Dept: PULMONOLOGY | Age: 83
End: 2018-12-04

## 2018-12-04 LAB
ANION GAP SERPL CALCULATED.3IONS-SCNC: 12 MMOL/L (ref 3–16)
BASOPHILS ABSOLUTE: 0.1 K/UL (ref 0–0.2)
BASOPHILS RELATIVE PERCENT: 1 %
BUN BLDV-MCNC: 27 MG/DL (ref 7–20)
CALCIUM SERPL-MCNC: 9.3 MG/DL (ref 8.3–10.6)
CHLORIDE BLD-SCNC: 101 MMOL/L (ref 99–110)
CO2: 29 MMOL/L (ref 21–32)
CREAT SERPL-MCNC: 1.4 MG/DL (ref 0.6–1.2)
EOSINOPHILS ABSOLUTE: 0.2 K/UL (ref 0–0.6)
EOSINOPHILS RELATIVE PERCENT: 2.5 %
GFR AFRICAN AMERICAN: 43
GFR NON-AFRICAN AMERICAN: 36
GLUCOSE BLD-MCNC: 113 MG/DL (ref 70–99)
HCT VFR BLD CALC: 39.4 % (ref 36–48)
HEMOGLOBIN: 12.4 G/DL (ref 12–16)
INR BLD: 1.23 (ref 0.86–1.14)
LYMPHOCYTES ABSOLUTE: 1.6 K/UL (ref 1–5.1)
LYMPHOCYTES RELATIVE PERCENT: 17 %
MCH RBC QN AUTO: 26.4 PG (ref 26–34)
MCHC RBC AUTO-ENTMCNC: 31.4 G/DL (ref 31–36)
MCV RBC AUTO: 83.9 FL (ref 80–100)
MONOCYTES ABSOLUTE: 1 K/UL (ref 0–1.3)
MONOCYTES RELATIVE PERCENT: 10 %
NEUTROPHILS ABSOLUTE: 6.7 K/UL (ref 1.7–7.7)
NEUTROPHILS RELATIVE PERCENT: 69.5 %
PDW BLD-RTO: 20.8 % (ref 12.4–15.4)
PLATELET # BLD: 352 K/UL (ref 135–450)
PMV BLD AUTO: 9.5 FL (ref 5–10.5)
POTASSIUM REFLEX MAGNESIUM: 4 MMOL/L (ref 3.5–5.1)
PROTHROMBIN TIME: 14 SEC (ref 9.8–13)
RBC # BLD: 4.69 M/UL (ref 4–5.2)
SODIUM BLD-SCNC: 142 MMOL/L (ref 136–145)
WBC # BLD: 9.7 K/UL (ref 4–11)

## 2018-12-04 PROCEDURE — 2700000000 HC OXYGEN THERAPY PER DAY

## 2018-12-04 PROCEDURE — 2580000003 HC RX 258: Performed by: INTERNAL MEDICINE

## 2018-12-04 PROCEDURE — G8987 SELF CARE CURRENT STATUS: HCPCS

## 2018-12-04 PROCEDURE — 94760 N-INVAS EAR/PLS OXIMETRY 1: CPT

## 2018-12-04 PROCEDURE — 99232 SBSQ HOSP IP/OBS MODERATE 35: CPT | Performed by: INTERNAL MEDICINE

## 2018-12-04 PROCEDURE — G8988 SELF CARE GOAL STATUS: HCPCS

## 2018-12-04 PROCEDURE — 86147 CARDIOLIPIN ANTIBODY EA IG: CPT

## 2018-12-04 PROCEDURE — 85610 PROTHROMBIN TIME: CPT

## 2018-12-04 PROCEDURE — 99232 SBSQ HOSP IP/OBS MODERATE 35: CPT | Performed by: CLINICAL NURSE SPECIALIST

## 2018-12-04 PROCEDURE — 80048 BASIC METABOLIC PNL TOTAL CA: CPT

## 2018-12-04 PROCEDURE — 36415 COLL VENOUS BLD VENIPUNCTURE: CPT

## 2018-12-04 PROCEDURE — 6370000000 HC RX 637 (ALT 250 FOR IP): Performed by: INTERNAL MEDICINE

## 2018-12-04 PROCEDURE — 97530 THERAPEUTIC ACTIVITIES: CPT

## 2018-12-04 PROCEDURE — 81241 F5 GENE: CPT

## 2018-12-04 PROCEDURE — 97166 OT EVAL MOD COMPLEX 45 MIN: CPT

## 2018-12-04 PROCEDURE — G8978 MOBILITY CURRENT STATUS: HCPCS

## 2018-12-04 PROCEDURE — 81240 F2 GENE: CPT

## 2018-12-04 PROCEDURE — 97161 PT EVAL LOW COMPLEX 20 MIN: CPT

## 2018-12-04 PROCEDURE — 85025 COMPLETE CBC W/AUTO DIFF WBC: CPT

## 2018-12-04 PROCEDURE — G8979 MOBILITY GOAL STATUS: HCPCS

## 2018-12-04 PROCEDURE — 6370000000 HC RX 637 (ALT 250 FOR IP): Performed by: HOSPITALIST

## 2018-12-04 PROCEDURE — 99232 SBSQ HOSP IP/OBS MODERATE 35: CPT | Performed by: PSYCHIATRY & NEUROLOGY

## 2018-12-04 PROCEDURE — 1200000000 HC SEMI PRIVATE

## 2018-12-04 RX ORDER — POTASSIUM CHLORIDE 20 MEQ/1
20 TABLET, EXTENDED RELEASE ORAL
Status: DISCONTINUED | OUTPATIENT
Start: 2018-12-05 | End: 2018-12-04

## 2018-12-04 RX ADMIN — Medication 10 ML: at 22:05

## 2018-12-04 RX ADMIN — OXYCODONE HYDROCHLORIDE AND ACETAMINOPHEN 500 MG: 500 TABLET ORAL at 09:43

## 2018-12-04 RX ADMIN — AMLODIPINE BESYLATE 2.5 MG: 5 TABLET ORAL at 22:04

## 2018-12-04 RX ADMIN — FERROUS SULFATE TAB 325 MG (65 MG ELEMENTAL FE) 325 MG: 325 (65 FE) TAB at 09:39

## 2018-12-04 RX ADMIN — Medication 10 ML: at 09:44

## 2018-12-04 RX ADMIN — LEVOTHYROXINE SODIUM 75 MCG: 75 TABLET ORAL at 05:21

## 2018-12-04 RX ADMIN — SPIRONOLACTONE 12.5 MG: 25 TABLET ORAL at 09:43

## 2018-12-04 RX ADMIN — PANTOPRAZOLE SODIUM 40 MG: 40 TABLET, DELAYED RELEASE ORAL at 05:21

## 2018-12-04 RX ADMIN — FERROUS SULFATE TAB 325 MG (65 MG ELEMENTAL FE) 325 MG: 325 (65 FE) TAB at 22:04

## 2018-12-04 RX ADMIN — POTASSIUM CHLORIDE 20 MEQ: 1500 TABLET, EXTENDED RELEASE ORAL at 09:39

## 2018-12-04 RX ADMIN — WARFARIN SODIUM 5 MG: 5 TABLET ORAL at 18:10

## 2018-12-04 ASSESSMENT — PAIN SCALES - GENERAL
PAINLEVEL_OUTOF10: 0

## 2018-12-04 NOTE — TELEPHONE ENCOUNTER
Jaylyn Miller scheduled/[facility] to sign order. Started 12/3 on 5mg warfarin daily. Switching from Xarelto. Discharging from [facility]. Scheduled patient for Friday 12/7.

## 2018-12-04 NOTE — PLAN OF CARE
Problem: Respiratory  Goal: O2 Sat > 90%  Weaned off O2. O2 sat 90 on room air. Stated, \"I am not having difficulty breathing. \"   Diuretics on hold due to kidney function. INR is not in therapeutic range.

## 2018-12-04 NOTE — PROGRESS NOTES
NEUROLOGY FOLLOWUP    HISTORY OF PRESENT ILLNESS :    Rakesh Day is a 80 y.o. female   History was obtained from the dictations in the chart. Patient has not had any further spells of unresponsiveness. I was initially consultative for the spell about 2 days ago in which patient was unresponsive and was difficult to arouse. This lasted for several minutes and she slowly improved. Patient has done well since that time. CT head was normal.  Denies any further neurological symptoms. REVIEW OF SYSTEMS    Constitutional:  []   Chills   []  Fatigue   []  Fevers   []  Malaise   []  Weight loss     [x] Denies all of the above    Respiratory:   []  Cough    []  Shortness of breath         [x] Denies all of the above     Cardiovascular:   []  Chest pain    []  Exertional chest pressure/discomfort           [] Palpitations    []  Syncope     [x] Denies all of the above    Past Medical History:   Diagnosis Date    Arthritis     Hyperlipidemia     Hypertension     Hypothyroidism     Movement disorder     scoliosis    Pulmonary emboli ([facility] [address] 12/2014    Scoliosis     Scoliosis     Thyroid disease     hypothyroid     Family History   Problem Relation Age of Onset    High Cholesterol Mother     High Cholesterol Father     Cancer Brother      Social History     Social History    Marital status:       Spouse name: N/A    Number of children: 6    Years of education: 12     Social History Main Topics    Smoking status: Former Smoker     Packs/day: 0.25     Years: 15.00    Smokeless tobacco: Former User     Quit date: 12/27/1965    Alcohol use No    Drug use: No    Sexual activity: Not Currently     Other Topics Concern    None     Social History Narrative    None        PHYSICAL EXAMINATION     /71   Pulse 73   Temp 98 °F (36.7 °C) (Oral)   Resp 17   Ht 5' 2\" (1.575 m)   Wt 161 lb (73 kg)   SpO2 94%

## 2018-12-04 NOTE — ONCOLOGY
Oncology and Hematology Care   Progress Note    12/4/2018    SUBJECTIVE: patient states she is comfortable and wants to go home. NO new problems. Xarelto was stopped after dose yesterday. Warfarin was started. She feels that she is stable. OBJECTIVE:    Physical Assessment:  Vitals:  BP (!) 92/58   Pulse 85   Temp 96.8 °F (36 °C) (Temporal)   Resp 17   Ht 5' 2\" (1.575 m)   Wt 161 lb (73 kg)   SpO2 93%   BMI 29.45 kg/m²    24HR INTAKE/OUTPUT:    Intake/Output Summary (Last 24 hours) at 12/04/18 2157  Last data filed at 12/03/18 1902   Gross per 24 hour   Intake              240 ml   Output                3 ml   Net              237 ml       CONSTITUTIONAL:  Awake, alert & oriented x3  HEENT: PERRL, Neck: soft, supple, no cervical, supraclavicular or axillary adenopathy  RESPIRATORY:  No increased work of breathing, breath sounds decreased. CARDIOVASCULAR:  Cardiac S1/S2, RRR  GASTROINTESTINAL:  abdomen soft +BS x4, no hepatosplenomegaly  SKIN:  negative for rash and skin lesion(s)  MUSCULOSKELETAL:  negative for pain and muscle weakness  EXTREMITIES: Negative for Lower extremity edema    Labs Results:    CBC: Recent Labs      12/02/18   0928  12/03/18   0512  12/04/18   0520   WBC  9.0  8.2  9.7   HGB  12.2  12.0  12.4   HCT  38.7  38.4  39.4   MCV  81.6  81.6  83.9   PLT  351  375  352     BMP: Recent Labs      12/02/18   0928  12/03/18   0511  12/04/18   0520   NA  141  143  142   K  3.8  3.1*  4.0   CL  98*  100  101   CO2  32  32  29   BUN  22*  22*  27*   CREATININE  0.9  1.0  1.4*     LIVER PROFILE: No results for input(s): AST, ALT, LIPASE, BILIDIR, BILITOT, ALKPHOS in the last 72 hours. Invalid input(s):   AMYLASE,  ALB  PT/INR:    Lab Results   Component Value Date    PROTIME 14.0 12/04/2018    PROTIME 20.6 12/03/2018    PROTIME 29.5 11/28/2018    INR 1.23 12/04/2018    INR 1.81 12/03/2018    INR 2.59 11/28/2018     PTT:    Lab Results   Component Value Date    APTT 34.8 07/11/2017    APTT Sonographer         Mi Pearce                                                            RVT, RDMS, AB,                                                            OB/GYN   Ordering Physician Meseret Portillo    Interpreting        [facility]                     Comfort Harrington PA-C       Physician           Abdulaziz Campbell MD,                                                            [facility]  Procedure Type of Study:   Veins:Lower Extremities DVT Study, VL EXTREMITY VENOUS DUPLEX LEFT. Vascular Sonographer Report  Additional Indications:left knee pain for past 2 days. Impressions Left Impression No evidence of deep vein or superficial vein thrombosis involving the left lower extremity and the right common femoral vein. Incidental finding on the left medial fossa: cystic structure with echogenic material within measuring 4.8 x 1.4 x 3.4 cm likely a Baker's cyst. Calf and ankle edema noted. Conclusions   Summary   - No evidence of deep vein or superficial vein thrombosis involving the left  lower extremity and the right common femoral vein. - Incidental finding on the left medial fossa: cystic structure with  echogenic material within measuring 4.8 x 1.4 x 3.4 cm, likely a Baker's  cyst.   Signature   ------------------------------------------------------------------  Electronically signed by Abdulaziz Campbell MD, [facility] (Interpreting  physician) on 11/28/2018 at 12:24 PM  ------------------------------------------------------------------  Patient Status:ER. [address] [place] [address] - Vascular Lab. Technical Quality:Adequate visualization. Velocities are measured in cm/s ; Diameters are measured in mm Right Lower Extremities DVT Study Measurements Right 2D Measurements +--------------+----------+---------------+----------+ ! Location      ! Visualized! Compressibility! Thrombosis! +--------------+----------+---------------+----------+ ! Common Femoral!Yes       ! Yes            ! None      ! +---------------+----+----+-----+----+ ! Prox CCA       !62.8!7.46!60   !0.88! +---------------+----+----+-----+----+ ! Mid CCA        !61.9!12. 7!60   !0.79! +---------------+----+----+-----+----+ ! Dist CCA       !51.8!11. 4!60   !0.78! +---------------+----+----+-----+----+ ! Prox ICA       !27. 6!5.33!60   !0.81! +---------------+----+----+-----+----+ ! Mid ICA        !25. 9!8.04!60   !0.69! +---------------+----+----+-----+----+ ! Dist ICA       !59. 2! 14  !60   !0.76! +---------------+----+----+-----+----+ ! Prox ECA       !65.9!    !60   !    ! +---------------+----+----+-----+----+ ! Vertebral      !36.1!    !60   !    ! +---------------+----+----+-----+----+ ! Prox [id number]!81  !    !60   !    ! +---------------+----+----+-----+----+   - There is antegrade vertebral flow noted on the right side. - Additional Measurements:ICAPSV/CCAPSV 0.96. ICAEDV/CCAEDV 1.88. Carotid Left Measurements +---------------+----+----+-----+----+ ! Location       ! PSV ! EDV ! Angle! RI  ! +---------------+----+----+-----+----+ ! Prox CCA       !74. 7!11. 3!60   !0.85! +---------------+----+----+-----+----+ ! Mid CCA        !68.3!9.83!60   !0.86! +---------------+----+----+-----+----+ ! Dist CCA       !51. 1!11. 2!60   !0.78! +---------------+----+----+-----+----+ ! Prox ICA       !32.5!7.84!60   !0.76! +---------------+----+----+-----+----+ ! Mid ICA        !46.6!12. 2!60   !0.74! +---------------+----+----+-----+----+ ! Dist ICA       !69. 1!16. 5!60   !0.76! +---------------+----+----+-----+----+ ! Prox ECA       !79  !    !61   !    ! +---------------+----+----+-----+----+ ! Vertebral      !27.3!    !60   !    ! +---------------+----+----+-----+----+ ! Prox Subclavian!81.6!    !60   !    ! +---------------+----+----+-----+----+   - There is antegrade vertebral flow noted on the left side. - Additional Measurements:ICAPSV/CCAPSV 1.01. ICAEDV/CCAEDV 1.46.       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 CHanging to warfarin. INR is not therapeutic Will discuss possible overlap therapy with pulmonary cardiology. According to Dr Lior Baig pulmonary did not recommend this  Pulmonary artery hypertension  Patient will need instruction on warfarin and arrangement for follow up in outpatient clinic  Hypercoagulable workup ordered yesterday she could follow up with Dr Lior Baig for results         I have discussed the above stated plan with the patient and they verbalized understanding and agreed with the plan. Thank you for allowing us to participate in this patients care.     Yunior Ledezma MD, 12/4/2018, 8:03 AM

## 2018-12-04 NOTE — PROGRESS NOTES
Admit Date: 11/28/2018  Hospital day 6    Subjective:     Chief complaint: Hypoxia    Interval history: Awake and alert. Comfortable on 2 L O2. Denies any chest pain or shortness of breath. No fevers noted. Mild hypotension noted yesterday. Scheduled Meds:   [START ON 12/5/2018] potassium chloride  20 mEq Oral Daily with breakfast    amLODIPine  2.5 mg Oral Nightly    warfarin  5 mg Oral Daily    spironolactone  12.5 mg Oral Daily    vitamin C  500 mg Oral Daily    ferrous sulfate  325 mg Oral BID    levothyroxine  75 mcg Oral Daily    pantoprazole  40 mg Oral QAM AC    sodium chloride flush  10 mL Intravenous 2 times per day     Continuous Infusions:  PRN Meds:sodium chloride flush, sodium chloride flush, magnesium hydroxide, ondansetron    Review of Systems  As per interval history and the rest of the 14 point systems were sought and noted to be negative    Objective:     Patient Vitals for the past 8 hrs:   BP Temp Temp src Pulse Resp SpO2 Weight   12/04/18 1000 - - - - - 90 % -   12/04/18 0845 107/71 98 °F (36.7 °C) Oral 73 - 94 % -   12/04/18 0520 (!) 92/58 - - 85 17 93 % 161 lb (73 kg)     I/O last 3 completed shifts: In: 240 [P.O.:240]  Out: 3 [Urine:2; Emesis/NG output:1]  No intake/output data recorded.     /71   Pulse 73   Temp 98 °F (36.7 °C) (Oral)   Resp 17   Ht 5' 2\" (1.575 m)   Wt 161 lb (73 kg)   SpO2 90%   BMI 29.45 kg/m²     General Appearance:    Alert, cooperative, no distress, appears stated age   Head:    Normocephalic, without obvious abnormality, atraumatic   Eyes:    PERRL, conjunctiva/corneas clear, EOM's intact, fundi     benign, both eyes   Ears:    Normal TM's and external ear canals, both ears   Nose:   Nares normal, septum midline, mucosa normal, no drainage    or sinus tenderness   Throat:   Lips, mucosa, and tongue normal; teeth and gums normal   Neck:   Supple, symmetrical, trachea midline, no adenopathy;     thyroid:  no enlargement/tenderness/nodules;

## 2018-12-05 VITALS
OXYGEN SATURATION: 94 % | TEMPERATURE: 99 F | HEIGHT: 62 IN | RESPIRATION RATE: 16 BRPM | HEART RATE: 88 BPM | DIASTOLIC BLOOD PRESSURE: 68 MMHG | WEIGHT: 161.2 LBS | SYSTOLIC BLOOD PRESSURE: 103 MMHG | BODY MASS INDEX: 29.66 KG/M2

## 2018-12-05 LAB
ANION GAP SERPL CALCULATED.3IONS-SCNC: 10 MMOL/L (ref 3–16)
BASOPHILS ABSOLUTE: 0.1 K/UL (ref 0–0.2)
BASOPHILS RELATIVE PERCENT: 1.3 %
BUN BLDV-MCNC: 23 MG/DL (ref 7–20)
CALCIUM SERPL-MCNC: 9.3 MG/DL (ref 8.3–10.6)
CHLORIDE BLD-SCNC: 97 MMOL/L (ref 99–110)
CO2: 31 MMOL/L (ref 21–32)
CREAT SERPL-MCNC: 1.1 MG/DL (ref 0.6–1.2)
EOSINOPHILS ABSOLUTE: 0.3 K/UL (ref 0–0.6)
EOSINOPHILS RELATIVE PERCENT: 4.1 %
GFR AFRICAN AMERICAN: 57
GFR NON-AFRICAN AMERICAN: 47
GLUCOSE BLD-MCNC: 116 MG/DL (ref 70–99)
HCT VFR BLD CALC: 38.8 % (ref 36–48)
HEMOGLOBIN: 12.3 G/DL (ref 12–16)
INR BLD: 1.11 (ref 0.86–1.14)
LYMPHOCYTES ABSOLUTE: 1.5 K/UL (ref 1–5.1)
LYMPHOCYTES RELATIVE PERCENT: 18.9 %
MCH RBC QN AUTO: 26.3 PG (ref 26–34)
MCHC RBC AUTO-ENTMCNC: 31.7 G/DL (ref 31–36)
MCV RBC AUTO: 82.8 FL (ref 80–100)
MONOCYTES ABSOLUTE: 0.8 K/UL (ref 0–1.3)
MONOCYTES RELATIVE PERCENT: 10.2 %
NEUTROPHILS ABSOLUTE: 5 K/UL (ref 1.7–7.7)
NEUTROPHILS RELATIVE PERCENT: 65.5 %
PDW BLD-RTO: 20.9 % (ref 12.4–15.4)
PLATELET # BLD: 350 K/UL (ref 135–450)
PMV BLD AUTO: 9.1 FL (ref 5–10.5)
POTASSIUM REFLEX MAGNESIUM: 3.9 MMOL/L (ref 3.5–5.1)
PROTHROMBIN TIME: 12.6 SEC (ref 9.8–13)
RBC # BLD: 4.69 M/UL (ref 4–5.2)
SODIUM BLD-SCNC: 138 MMOL/L (ref 136–145)
WBC # BLD: 7.7 K/UL (ref 4–11)

## 2018-12-05 PROCEDURE — 2700000000 HC OXYGEN THERAPY PER DAY

## 2018-12-05 PROCEDURE — 6370000000 HC RX 637 (ALT 250 FOR IP): Performed by: INTERNAL MEDICINE

## 2018-12-05 PROCEDURE — 80048 BASIC METABOLIC PNL TOTAL CA: CPT

## 2018-12-05 PROCEDURE — 2580000003 HC RX 258: Performed by: INTERNAL MEDICINE

## 2018-12-05 PROCEDURE — 85025 COMPLETE CBC W/AUTO DIFF WBC: CPT

## 2018-12-05 PROCEDURE — 85610 PROTHROMBIN TIME: CPT

## 2018-12-05 PROCEDURE — 36415 COLL VENOUS BLD VENIPUNCTURE: CPT

## 2018-12-05 RX ORDER — WARFARIN SODIUM 5 MG/1
TABLET ORAL
Qty: 15 TABLET | Refills: 0 | Status: SHIPPED | OUTPATIENT
Start: 2018-12-05

## 2018-12-05 RX ORDER — SPIRONOLACTONE 25 MG/1
12.5 TABLET ORAL DAILY
Qty: 30 TABLET | Refills: 3 | Status: SHIPPED | OUTPATIENT
Start: 2018-12-06 | End: 2019-08-07 | Stop reason: SDUPTHER

## 2018-12-05 RX ORDER — LISINOPRIL 2.5 MG/1
2.5 TABLET ORAL DAILY
Qty: 30 TABLET | Refills: 3 | Status: SHIPPED | OUTPATIENT
Start: 2018-12-05 | End: 2019-04-04 | Stop reason: SDUPTHER

## 2018-12-05 RX ORDER — AMLODIPINE BESYLATE 2.5 MG/1
2.5 TABLET ORAL NIGHTLY
Qty: 30 TABLET | Refills: 3 | Status: SHIPPED | OUTPATIENT
Start: 2018-12-05 | End: 2019-10-21 | Stop reason: SDUPTHER

## 2018-12-05 RX ORDER — LISINOPRIL 5 MG/1
2.5 TABLET ORAL DAILY
Status: DISCONTINUED | OUTPATIENT
Start: 2018-12-05 | End: 2018-12-05 | Stop reason: HOSPADM

## 2018-12-05 RX ADMIN — FERROUS SULFATE TAB 325 MG (65 MG ELEMENTAL FE) 325 MG: 325 (65 FE) TAB at 09:06

## 2018-12-05 RX ADMIN — OXYCODONE HYDROCHLORIDE AND ACETAMINOPHEN 500 MG: 500 TABLET ORAL at 09:06

## 2018-12-05 RX ADMIN — PANTOPRAZOLE SODIUM 40 MG: 40 TABLET, DELAYED RELEASE ORAL at 05:05

## 2018-12-05 RX ADMIN — Medication 10 ML: at 09:06

## 2018-12-05 RX ADMIN — LEVOTHYROXINE SODIUM 75 MCG: 75 TABLET ORAL at 05:05

## 2018-12-05 RX ADMIN — SPIRONOLACTONE 12.5 MG: 25 TABLET ORAL at 09:06

## 2018-12-05 ASSESSMENT — PAIN SCALES - GENERAL
PAINLEVEL_OUTOF10: 0

## 2018-12-05 NOTE — PROGRESS NOTES
[facility] home care referral. Spoke with pt and re: home care plan of care/services. Agreeable. Will follow for home care.

## 2018-12-05 NOTE — DISCHARGE SUMMARY
extremity pain, with history of pulmonary embolism she was evaluated with a VQ scan which was noted to be high probability. The patient has been on Xarelto. The care involved consultation with cardiology, pulmonary, oncology, nephrology. She developed acute renal failure for which her ACE inhibitor was held for one day and subsequently a lower dose recommended by nephrology. Her antihypertensive regimen was adjusted in consultation with nephrology. Symptoms significantly improved. She is requiring home oxygen for chronic hypoxic respiratory failure and has been evaluated by pulmonology during this hospitalization. Following medical problems were addressed during this hospitalization      Acute renal failure, resolved, low-dose lisinopril per nephrology recommendation, appreciate nephrology input, follow up PCP/nephrology for outpatient monitoring of creatinine. Hypotension due to antihypertensives : Resolvedmedications Adjusted  Hypokalemia, replaced  Acute metabolic encephalopathy ;   resolved, CT head negative for acute intracranial hemorrhage, apt. Neurology input;    chronic hypoxic resp. Failure; multifactorial; home  O2 ordered;  FU Pulmonary   VQ scan noted to be high probability  for PE:  Possible  CTEPH. No pulmonary angiogram; Patient is not interested in aggressive management  Chronic anticoagulation,  Changed   to coumadin per oncology  ; FU INR   Remote history of smoking   History of pulmonary embolism  Pulmonary hypertension, likely multifactorial due to aortic stenosis, chronic diastolic HF, appreciate cardiology input  Synovial cyst of knee   joint: Follow PCP  Disposition: Home with home health care  Follow-up, PCP, cardiology, nephrology, pulmonary    Consults.   IP CONSULT TO HOSPITALIST  IP CONSULT TO CARDIOLOGY  IP CONSULT TO PULMONOLOGY  IP CONSULT TO PULMONOLOGY  IP CONSULT TO NEUROLOGY  IP CONSULT TO HEM/ONC  IP CONSULT TO ONCOLOGY  IP CONSULT TO NEPHROLOGY  IP CONSULT TO
week   Disposition. home  Activity. ambulate in house  Diet: DIET LOW SODIUM 2 GM; Spent 23  minutes in discharge process.     Signed:  Robson Messina MD     12/5/2018 2:03 PM

## 2018-12-05 NOTE — PROGRESS NOTES
Nephrology progress Note      History of Present Ilness:    Kwan Olson is a 80 y.o. female with  H/o Chronic Thromboembolism, HTN, hypothyroidism who presented with pain left leg. She was hypoxic in ER and had CTA to r/o PE which was negative. Also. She has low BP. No cough, no fever, no chills. At home on HCTZ, ramipril. Was getting iv lasix which has been discontinued. Baseline creat 0.9 ---> increased to 1.4     Had CTA on 11/28/18   Good UO    Interval Hx    Feels better   No SOB  BP low stable.   creatinine level better       Past Medical History:   Diagnosis Date    Arthritis     Hyperlipidemia     Hypertension     Hypothyroidism     Movement disorder     scoliosis    Pulmonary emboli ([address] 12/2014    Scoliosis     Scoliosis     Thyroid disease     hypothyroid       Past Surgical History:   Procedure Laterality Date    COLONOSCOPY  7/08    JOINT REPLACEMENT      both knees       Family History   Problem Relation Age of Onset    High Cholesterol Mother     High Cholesterol Father     Cancer Brother          Current Medications:      amLODIPine (NORVASC) tablet 2.5 mg Nightly   warfarin (COUMADIN) tablet 5 mg Daily   spironolactone (ALDACTONE) tablet 12.5 mg Daily   sodium chloride flush 0.9 % injection 10 mL PRN   vitamin C (ASCORBIC ACID) tablet 500 mg Daily   ferrous sulfate tablet 325 mg BID   levothyroxine (SYNTHROID) tablet 75 mcg Daily   pantoprazole (PROTONIX) tablet 40 mg QAM AC   sodium chloride flush 0.9 % injection 10 mL 2 times per day   sodium chloride flush 0.9 % injection 10 mL PRN   magnesium hydroxide (MILK OF MAGNESIA) 400 MG/5ML suspension 30 mL Daily PRN   ondansetron (ZOFRAN) injection 4 mg Q6H PRN           Physical exam:     Vitals:  BP (!) 103/57   Pulse 78   Temp 98.1 °F (36.7 °C) (Temporal)   Resp 16   Ht 5' 2\" (1.575 m)   Wt 161 lb 3.2 oz (73.1 kg)   SpO2 91%   BMI 29.48 kg/m²   Constitutional:  OAA X3 NAD  Skin: no rash, turgor wnl  Heent:  eomi,

## 2018-12-05 NOTE — DISCHARGE INSTR - COC
Continuity of Care Form    Patient Name: Kun Guillen   :  1934  MRN:  [id number]    Admit date:  2018  Discharge date:  2018    Code Status Order: Full Code   Advance Directives:   Advance Care Flowsheet Documentation     Date/Time Healthcare Directive Type of Healthcare Directive Copy in [address] Agent's Name Healthcare Agent's Phone Number    18 1049  No, patient does not have an advance directive for healthcare treatment  --  --  --  --  --    18 1850  Yes, patient has an advance directive for healthcare treatment  Living will  Yes, copy in chart  --  aristides osullivanhr  [phone]          Admitting Physician:  Gurvinder Sarabia MD  PCP: Tyler Heaton MD    Discharging Nurse: FARSHAD CARTY [facility] Unit/Room#: 9IA-7429/5233-65  Discharging Unit Phone Number: [phone]    Emergency Contact:   Extended Emergency Contact Information  Primary Emergency Contact: [facility] [address] Phone: [phone]  Mobile Phone: [phone]  Relation: Child  Secondary Emergency Contact: [facility] [address] [facility] [address] Phone: [phone]  Mobile Phone: [phone]  Relation: Child    Past Surgical History:  Past Surgical History:   Procedure Laterality Date    COLONOSCOPY      JOINT REPLACEMENT      both knees       Immunization History:   Immunization History   Administered Date(s) Administered    DT 10/18/2012    Influenza Virus Vaccine 2013, 2015    Influenza Whole 2011    Influenza, High Dose (Fluzone 65 yrs and older) 10/15/2012, 2014, 2016, 2017, 10/19/2018    Pneumococcal 13-valent Conjugate (Sxroydv01) 2017    Pneumococcal Polysaccharide (Ytigybuon39) 1997, 10/13/2011    Td, unspecified formulation 1983    Zoster Live (Zostavax) 2013       Active Problems:  Patient Active Problem List   Diagnosis Code    Hyperlipidemia E78.5  Hypertension I10    Scoliosis M41.9    Hypothyroidism E03.9    Weakness generalized R53.1    Acute respiratory failure (HCC) J96.00    Abnormal EKG R94.31    VTE (venous thromboembolism) I82.90    History of pulmonary embolism Z86.711    Anemia D64.9    Weakness R53.1    Generalized weakness R53.1    Vertigo R42    Hypoxia R09.02    CTEPH (chronic thromboembolic pulmonary hypertension) ([facility]) I27.24    PAH (pulmonary artery hypertension) ([facility]) I27.21    Nonrheumatic aortic valve stenosis I35.0    Pulmonary hypertension ([facility]) I27.20    Acute encephalopathy G93.40       Isolation/Infection:   Isolation          No Isolation            Nurse Assessment:  Last Vital Signs: /68   Pulse 88   Temp 99 °F (37.2 °C) (Temporal)   Resp 16   Ht 5' 2\" (1.575 m)   Wt 161 lb 3.2 oz (73.1 kg)   SpO2 94%   BMI 29.48 kg/m²     Last documented pain score (0-10 scale): Pain Level: 0  Last Weight:   Wt Readings from Last 1 Encounters:   12/05/18 161 lb 3.2 oz (73.1 kg)     Mental Status:  oriented and alert    IV Access:  - None    Nursing Mobility/ADLs:  Walking   Dependent  Transfer  Assisted  Bathing  Dependent  Dressing  Assisted  Toileting  Assisted  Feeding  Independent  Med Admin  Independent  Med Delivery   whole    Wound Care Documentation and Therapy:        Elimination:  Continence:   · Bowel: No  · Bladder: No  Urinary Catheter: None   Colostomy/Ileostomy/Ileal Conduit: No       Date of Last BM: 12/4/2018    Intake/Output Summary (Last 24 hours) at 12/05/18 1240  Last data filed at 12/05/18 0912   Gross per 24 hour   Intake              600 ml   Output                0 ml   Net              600 ml     I/O last 3 completed shifts:   In: 480 [P.O.:480]  Out: -     Safety Concerns:     History of Falls (last 30 days) and At Risk for Falls    Impairments/Disabilities:      Vision    Nutrition Therapy:  Current Nutrition Therapy:   - Oral Diet:  Low Sodium (2gm)    Routes of Feeding: Oral  Liquids:

## 2018-12-06 ENCOUNTER — TELEPHONE (OUTPATIENT)
Dept: FAMILY MEDICINE CLINIC | Age: 83
End: 2018-12-06

## 2018-12-06 LAB
ANTICARDIOLIPIN IGA ANTIBODY: 1 APL (ref 0–11)
ANTICARDIOLIPIN IGG ANTIBODY: 1 GPL (ref 0–14)
CARDIOLIPIN AB IGM: 33 MPL (ref 0–12)

## 2018-12-06 NOTE — LETTER
[address] [place] [address]  Phone: [phone]  Fax: [phone]    Edinson Holland MD        December 6, 2018    [facility] [place] [place] [address]      Dear Rhina Ewing: Below are a list of your Future Appointments as we spoke about on the phone today. Future Appointments  Date Time Provider Giovany Marie   12/7/2018 3:30 PM [facility]   12/10/2018 1:15 PM Jeremy Cole MD [facility] Nephrolo   12/12/2018 11:30 AM Shiraz Price MD [facility] Cardio MMA   12/13/2018 3:00 PM Shobha Palma MD [facility]       If you have any questions or concerns, please don't hesitate to call.     Sincerely,        Ty, JACOBA

## 2018-12-06 NOTE — PROGRESS NOTES
Physical Therapy  Physical Therapy Discharge Summary    Name: Ted Byrne  : 1934    The pt was evaluated by PT on  and seen for 0 treatment sessions prior to DC to home on  per MD order. The pt's acute therapy goals were:  Short term goals  Time Frame for Short term goals: upon d/c  Short term goal 1: Pt will perform transfers SBA  Short term goal 2: Pt will ambulate 25' SBA       Patient met 0 goals during stay. Number of Refusals:0  Number of Holds: 0  During this hospitalization, the patient was educated on:   POC, role of acute PT, discharge recommendations    DC pt from PT caseload at this time.   Thank you, Jabari Gimenez, PT, DPT, [phone]

## 2018-12-07 ENCOUNTER — ANTI-COAG VISIT (OUTPATIENT)
Dept: PHARMACY | Age: 83
End: 2018-12-07
Payer: MEDICARE

## 2018-12-07 DIAGNOSIS — Z86.711 HISTORY OF PULMONARY EMBOLISM: ICD-10-CM

## 2018-12-07 LAB
FACTOR V LEIDEN: NEGATIVE
INTERNATIONAL NORMALIZATION RATIO, POC: 1
SPECIMEN: NORMAL

## 2018-12-07 PROCEDURE — 85610 PROTHROMBIN TIME: CPT

## 2018-12-07 PROCEDURE — 99213 OFFICE O/P EST LOW 20 MIN: CPT

## 2018-12-08 LAB
PROTHROMBIN G20210A MUTATION: NEGATIVE
PT PCR SPECIMEN: NORMAL

## 2018-12-10 ENCOUNTER — TELEPHONE (OUTPATIENT)
Dept: FAMILY MEDICINE CLINIC | Age: 83
End: 2018-12-10

## 2018-12-10 NOTE — TELEPHONE ENCOUNTER
Sheryl with [facility] Coumadin and Cranberry pill that Sheryl instructed pt to stop taking. Mikael Murry is asking to reconcile pt medication.  Please contact Sheryl @ [phone]

## 2018-12-10 NOTE — TELEPHONE ENCOUNTER
I am uncertain what they want. Visit in order to stop the cranberry pill? If so, why? How long has she been on it? If she has been on it for a while then I would presume they have her Coumadin dose adjusted to being on the cranberry pill.   See me if questions

## 2018-12-10 NOTE — TELEPHONE ENCOUNTER
Second sentence should read is it an order to stop the cranberry pill not visit in order.  Error in dictation

## 2018-12-12 ENCOUNTER — ANTI-COAG VISIT (OUTPATIENT)
Dept: PHARMACY | Age: 83
End: 2018-12-12
Payer: MEDICARE

## 2018-12-12 ENCOUNTER — OFFICE VISIT (OUTPATIENT)
Dept: CARDIOLOGY CLINIC | Age: 83
End: 2018-12-12
Payer: MEDICARE

## 2018-12-12 ENCOUNTER — HOSPITAL ENCOUNTER (OUTPATIENT)
Age: 83
Discharge: HOME | End: 2018-12-12
Payer: MEDICARE

## 2018-12-12 VITALS — SYSTOLIC BLOOD PRESSURE: 130 MMHG | HEART RATE: 80 BPM | DIASTOLIC BLOOD PRESSURE: 72 MMHG

## 2018-12-12 DIAGNOSIS — R06.02 SHORTNESS OF BREATH: ICD-10-CM

## 2018-12-12 DIAGNOSIS — Z86.711 HISTORY OF PULMONARY EMBOLISM: ICD-10-CM

## 2018-12-12 DIAGNOSIS — I27.24 CTEPH (CHRONIC THROMBOEMBOLIC PULMONARY HYPERTENSION) (HCC): ICD-10-CM

## 2018-12-12 DIAGNOSIS — E78.00 PURE HYPERCHOLESTEROLEMIA: Chronic | ICD-10-CM

## 2018-12-12 DIAGNOSIS — I27.24 CTEPH (CHRONIC THROMBOEMBOLIC PULMONARY HYPERTENSION) (HCC): Primary | ICD-10-CM

## 2018-12-12 DIAGNOSIS — I10 ESSENTIAL HYPERTENSION: Chronic | ICD-10-CM

## 2018-12-12 DIAGNOSIS — I82.90 VTE (VENOUS THROMBOEMBOLISM): ICD-10-CM

## 2018-12-12 DIAGNOSIS — I27.21 PAH (PULMONARY ARTERY HYPERTENSION) (HCC): ICD-10-CM

## 2018-12-12 DIAGNOSIS — R09.02 HYPOXIA: ICD-10-CM

## 2018-12-12 LAB
ANION GAP SERPL CALCULATED.3IONS-SCNC: 9 MMOL/L (ref 3–16)
BUN BLDV-MCNC: 13 MG/DL (ref 7–20)
CALCIUM SERPL-MCNC: 9.7 MG/DL (ref 8.3–10.6)
CHLORIDE BLD-SCNC: 107 MMOL/L (ref 99–110)
CO2: 27 MMOL/L (ref 21–32)
CREAT SERPL-MCNC: 0.8 MG/DL (ref 0.6–1.2)
GFR AFRICAN AMERICAN: >60
GFR NON-AFRICAN AMERICAN: >60
GLUCOSE BLD-MCNC: 98 MG/DL (ref 70–99)
INTERNATIONAL NORMALIZATION RATIO, POC: 2
POTASSIUM SERPL-SCNC: 5.1 MMOL/L (ref 3.5–5.1)
PRO-BNP: 469 PG/ML (ref 0–449)
SODIUM BLD-SCNC: 143 MMOL/L (ref 136–145)

## 2018-12-12 PROCEDURE — 80048 BASIC METABOLIC PNL TOTAL CA: CPT

## 2018-12-12 PROCEDURE — 83880 ASSAY OF NATRIURETIC PEPTIDE: CPT

## 2018-12-12 PROCEDURE — 36415 COLL VENOUS BLD VENIPUNCTURE: CPT

## 2018-12-12 PROCEDURE — 99214 OFFICE O/P EST MOD 30 MIN: CPT | Performed by: INTERNAL MEDICINE

## 2018-12-12 PROCEDURE — 99211 OFF/OP EST MAY X REQ PHY/QHP: CPT

## 2018-12-12 PROCEDURE — 85610 PROTHROMBIN TIME: CPT

## 2018-12-12 RX ORDER — FUROSEMIDE 20 MG/1
TABLET ORAL
Qty: 30 TABLET | Refills: 2 | Status: SHIPPED | OUTPATIENT
Start: 2018-12-12 | End: 2019-03-05 | Stop reason: SDUPTHER

## 2018-12-12 NOTE — PROGRESS NOTES
[address]  Advanced CHF/Pulmonary Hypertension   Cardiac Evaluation      Higinio Crews  YOB: 1934    Date of Visit:  12/12/18      Chief Complaint   Patient presents with    Congestive Heart Failure     No cardiac complaints        History of Present Illness:  Higinio Crews is a 80 y.o. female who presents for hospital follow up from [facility] after presenting on 11/28/18 with leg pain and swelling. She has a history of HTN, hyperlipidemia, pulmonary embolus, thyroid disease who presents to the ED with left posterior knee and calf pain with swelling for the past 2 days. She typically ambulates with a walker and is still able to ambulate. She was found to have hypoxia with oxygen saturations in the high 80's. She denied shortness of breath. Son was is at bedside and he had not noticed her to be short of breath. Since her initial PE was diagnosed, she had been on Xarelto. VQ scan showed high probability of VQ scan and was diagnosed with CTEPH. She was also fluid overloaded during her hospitalization and was diuresed with IV furosemide. She developed ARF and ace-I was held temporarily then dose was reduced. She was not discharged on a diuretic although she was sent home with oxygen. Today she is at the visit with her son. She has not been weighing herself and doesn't recall being instructed to do so at discharge. She is doing well with her oxygen but is asking how long she needs to use it. She will see pulmonologist tomorrow. Her son said he took her off oxygen while trying to get her in the car today and she became very winded and was breathing heavily without the oxygen. She sleeps well at night and denies orthopnea or PND. She has no significant swelling on exam.  She denies exertional chest pain. Her INRs are followed in [facility] clinic and has an appointment for INR check after the office today. She denies current bleeding or excessive bruising.       Recent fluid intake, or urination. No tremor. · Hematologic/Lymphatic: No abnormal bruising or bleeding, blood clots or swollen lymph nodes. · Allergic/Immunologic: No nasal congestion or hives. Physical Examination:    Vitals:    12/12/18 1136   BP: 130/72   Pulse: 80     There is no height or weight on file to calculate BMI. Wt Readings from Last 3 Encounters:   12/10/18 150 lb (68 kg)   12/05/18 161 lb 3.2 oz (73.1 kg)   11/08/18 130 lb (59 kg)     BP Readings from Last 3 Encounters:   12/12/18 130/72   12/10/18 120/69   12/05/18 103/68     Constitutional and General Appearance:   WD/WN in NAD  HEENT:  NC/AT  JUAN JOSÉ  No problems with hearing  Skin:  Warm, dry  Respiratory:  · Normal excursion and expansion without use of accessory muscles  · Resp Auscultation: Normal breath sounds without dullness  Cardiovascular:  · The apical impulses not displaced  · Heart tones are crisp and normal  · Cervical veins are not engorged  · The carotid upstroke is normal in amplitude and contour without delay or bruit  · JVP less than 8 cm H2O  RRR with nl S1 and S2 without m,r,g  · Peripheral pulses are symmetrical and full  · There is no clubbing, cyanosis of the extremities. · No edema  · Femoral Arteries: 2+ and equal  · Pedal Pulses: 2+ and equal   Neck:  · No thyromegaly  Abdomen:  · No masses or tenderness  · Liver/Spleen: No Abnormalities Noted  Neurological/Psychiatric:  · Alert and oriented in all spheres  · Moves all extremities well  · Exhibits normal gait balance and coordination  · No abnormalities of mood, affect, memory, mentation, or behavior are noted      Assessment:    1. CTEPH (chronic thromboembolic pulmonary hypertension) ([facility] [address]    2. Pure hypercholesterolemia    3. Essential hypertension    4. Hypoxia    5. VTE (venous thromboembolism)       CTEPH:  VTE:   ~ Hx of PE, anticoagulated with Xarelto. 11/28/18 admission for shortness of breath and lower extremity edema.   VQ scan - high probability for PE although left venous duplex was negative for DVT  ~ Echo showed RVSP estimation of 70 mmHg, suggestive of severe pulm HTN. Volume overload on admission was treated with furosemide. ~ switched to warfarin due to concern for recurrent PE on Xarelto. ~ tx with Lisinopril, Spironolactone. Will add furosemide as needed based on blood work today. Hypercholesterolemia:  ~ not on statin (was on Atorvastatin previously but was discontinued at discharge during 4/26/16 hospitalization)  ~ no recent lipid profile     Hypertension:  Blood pressure 130/72, pulse 80.  ~stable on current medication    Hypoxia:  ~ respiratory failure during 11/2018 hospitalization and discharged on oxygen  ~ patient gets dyspneic with activity when O2 is removed  ~ will see pulmonary tomorrow       Plan:  1. BMP and BNP today  2. Based on lab results, will give directions for furosemide 20 mg (anticipate giving this ~ 3 times per week). 3.  Weight self daily and record. Call office for weight gain of 3 lbs ore more  4. FU in 3-4 weeks    Scribe's attestation: This note was scribed in the presence of Josafat Moore M.D. by Sonal Holm RN    The scribe's documentation has been prepared under my direction and personally reviewed by me in its entirety. I confirm that the note above accurately reflects all work, treatment, procedures, and medical decision making performed by me.                 QUALITY MEASURES  1. Tobacco Cessation Counseling:  NA  2. Retake of BP if >140/90:   NA  3. Documentation to PCP/referring for new patient:  Sent to PCP at close of office visit  4. CAD patient on anti-platelet: No  5. CAD patient on STATIN therapy:  No  6. Patient with CHF and aFib on anticoagulation:  Yes   Anticoagulation for hx of PE      I appreciate the opportunity of cooperating in the care of this patient.     Josafat Moore M.D., [facility] [place]

## 2018-12-13 ENCOUNTER — OFFICE VISIT (OUTPATIENT)
Dept: PULMONOLOGY | Age: 83
End: 2018-12-13
Payer: MEDICARE

## 2018-12-13 VITALS
SYSTOLIC BLOOD PRESSURE: 112 MMHG | BODY MASS INDEX: 29.45 KG/M2 | DIASTOLIC BLOOD PRESSURE: 74 MMHG | HEART RATE: 102 BPM | OXYGEN SATURATION: 91 % | RESPIRATION RATE: 16 BRPM | WEIGHT: 161 LBS

## 2018-12-13 DIAGNOSIS — I27.24 CTEPH (CHRONIC THROMBOEMBOLIC PULMONARY HYPERTENSION) (HCC): Primary | ICD-10-CM

## 2018-12-13 PROCEDURE — 99213 OFFICE O/P EST LOW 20 MIN: CPT | Performed by: INTERNAL MEDICINE

## 2018-12-13 ASSESSMENT — ENCOUNTER SYMPTOMS
COUGH: 0
WHEEZING: 0
CHOKING: 0
ABDOMINAL PAIN: 0
CONSTIPATION: 0
SINUS PRESSURE: 0
SORE THROAT: 0
CHEST TIGHTNESS: 0
BLOOD IN STOOL: 0
SHORTNESS OF BREATH: 1
DIARRHEA: 0
APNEA: 0
RHINORRHEA: 0
ANAL BLEEDING: 0
VOICE CHANGE: 0
ABDOMINAL DISTENTION: 0
STRIDOR: 0

## 2018-12-13 NOTE — PROGRESS NOTES
Amelia Sarmiento    YOB: 1934     Date of Service:  12/13/2018     Chief Complaint   Patient presents with    Pulmonary Embolism     HFU on 2LPM of Oxygen          HPI patient recently hospitalized between 11/28 and 12/5 with a ruptured Baker's cyst, was incidentally noted to be hypoxic and further evaluation including CT/VQ scan/2-D echo suggestive of CTEPH. Currently on home oxygen, uses 2 L continuously. Started on Lasix by cardiology. Allergies   Allergen Reactions    Naprosyn [Naproxen] Nausea Only     No outpatient prescriptions have been marked as taking for the 12/13/18 encounter (Office Visit) with Yuly Johnson MD.       Immunization History   Administered Date(s) Administered    DT 10/18/2012    Influenza Virus Vaccine 11/21/2013, 09/23/2015    Influenza Whole 09/29/2011    Influenza, High Dose (Fluzone 65 yrs and older) 10/15/2012, 09/16/2014, 11/06/2016, 09/25/2017, 10/19/2018    Pneumococcal 13-valent Conjugate (Fstcucc65) 01/24/2017    Pneumococcal Polysaccharide (Lareyzafv26) 12/22/1997, 10/13/2011    Td, unspecified formulation 03/14/1983    Zoster Live (Zostavax) 11/21/2013       Past Medical History:   Diagnosis Date    Arthritis     Hyperlipidemia     Hypertension     Hypothyroidism     Movement disorder     scoliosis    Pulmonary emboli ([address] 12/2014    Scoliosis     Scoliosis     Thyroid disease     hypothyroid     Past Surgical History:   Procedure Laterality Date    COLONOSCOPY  7/08    JOINT REPLACEMENT      both knees     Family History   Problem Relation Age of Onset    High Cholesterol Mother     High Cholesterol Father     Cancer Brother        Review of Systems:  Review of Systems   Constitutional: Positive for fatigue. Negative for activity change, appetite change and fever. HENT: Negative for congestion, ear discharge, ear pain, postnasal drip, rhinorrhea, sinus pressure, sneezing, sore throat, tinnitus and voice change. Health Maintenance   Topic Date Due    DEXA (modify frequency per FRAX score)  11/04/1999    DTaP/Tdap/Td vaccine (1 - Tdap) 10/19/2012    Shingles Vaccine (1 of 2 - 2 Dose Series) 01/21/2014    TSH testing  07/12/2018    Potassium monitoring  12/12/2019    Creatinine monitoring  12/12/2019    Flu vaccine  Completed    Pneumococcal low/med risk  Completed          Assessment/Plan:    CTEPH with severe pulmonary hypertension, not started on riociguat (Adempas) yet. On Lasix 20mg daily as per cardiology. Patient/family decided against invasive investigations. NOAC switched to Coumadin during hospitalization    Requires 2 L O2, we will organize for portable oxygen concentrator for portability/mobility. Return in about 3 months (around 3/13/2019).

## 2018-12-16 DIAGNOSIS — M41.25 OTHER IDIOPATHIC SCOLIOSIS, THORACOLUMBAR REGION: ICD-10-CM

## 2018-12-17 RX ORDER — TRAMADOL HYDROCHLORIDE 50 MG/1
TABLET ORAL
Qty: 60 TABLET | Refills: 2 | Status: SHIPPED | OUTPATIENT
Start: 2018-12-17 | End: 2019-03-17 | Stop reason: SDUPTHER

## 2018-12-18 ENCOUNTER — PATIENT MESSAGE (OUTPATIENT)
Dept: PHARMACY | Age: 83
End: 2018-12-18

## 2018-12-18 ENCOUNTER — PATIENT MESSAGE (OUTPATIENT)
Dept: CARDIOLOGY CLINIC | Age: 83
End: 2018-12-18

## 2018-12-20 ENCOUNTER — ANTI-COAG VISIT (OUTPATIENT)
Dept: PHARMACY | Age: 83
End: 2018-12-20
Payer: MEDICARE

## 2018-12-20 DIAGNOSIS — Z86.711 HISTORY OF PULMONARY EMBOLISM: ICD-10-CM

## 2018-12-20 LAB — INTERNATIONAL NORMALIZATION RATIO, POC: 2.8

## 2018-12-20 PROCEDURE — 99211 OFF/OP EST MAY X REQ PHY/QHP: CPT

## 2018-12-20 PROCEDURE — 85610 PROTHROMBIN TIME: CPT

## 2018-12-26 ENCOUNTER — PATIENT MESSAGE (OUTPATIENT)
Dept: PULMONOLOGY | Age: 83
End: 2018-12-26

## 2018-12-27 NOTE — TELEPHONE ENCOUNTER
I called and spoke with [facility]. They needed additional info on the office note in which I re faxed.

## 2018-12-28 ENCOUNTER — ANTI-COAG VISIT (OUTPATIENT)
Dept: PHARMACY | Age: 83
End: 2018-12-28
Payer: MEDICARE

## 2018-12-28 DIAGNOSIS — Z86.711 HISTORY OF PULMONARY EMBOLISM: ICD-10-CM

## 2018-12-28 LAB — INTERNATIONAL NORMALIZATION RATIO, POC: 3.6

## 2018-12-28 PROCEDURE — G0463 HOSPITAL OUTPT CLINIC VISIT: HCPCS

## 2018-12-28 PROCEDURE — 85610 PROTHROMBIN TIME: CPT

## 2018-12-28 NOTE — PROGRESS NOTES
12/12/2018 2.0   12/07/2018 1.0   12/05/2018 1.11   12/04/2018 1.23 (H)   12/03/2018 1.81 (H)   11/28/2018 2.59 (H)

## 2019-01-03 ENCOUNTER — ANTI-COAG VISIT (OUTPATIENT)
Dept: PHARMACY | Age: 84
End: 2019-01-03
Payer: MEDICARE

## 2019-01-03 DIAGNOSIS — Z86.711 HISTORY OF PULMONARY EMBOLISM: ICD-10-CM

## 2019-01-03 LAB — INTERNATIONAL NORMALIZATION RATIO, POC: 2.3

## 2019-01-03 PROCEDURE — 85610 PROTHROMBIN TIME: CPT

## 2019-01-03 PROCEDURE — 99211 OFF/OP EST MAY X REQ PHY/QHP: CPT

## 2019-01-08 ENCOUNTER — PATIENT MESSAGE (OUTPATIENT)
Dept: PHARMACY | Age: 84
End: 2019-01-08

## 2019-01-11 ENCOUNTER — ANTI-COAG VISIT (OUTPATIENT)
Dept: PHARMACY | Age: 84
End: 2019-01-11
Payer: MEDICARE

## 2019-01-11 ENCOUNTER — OFFICE VISIT (OUTPATIENT)
Dept: CARDIOLOGY CLINIC | Age: 84
End: 2019-01-11
Payer: MEDICARE

## 2019-01-11 ENCOUNTER — HOSPITAL ENCOUNTER (OUTPATIENT)
Age: 84
Discharge: HOME | End: 2019-01-11
Payer: MEDICARE

## 2019-01-11 VITALS
WEIGHT: 163.8 LBS | OXYGEN SATURATION: 95 % | HEIGHT: 62 IN | HEART RATE: 74 BPM | RESPIRATION RATE: 17 BRPM | DIASTOLIC BLOOD PRESSURE: 58 MMHG | SYSTOLIC BLOOD PRESSURE: 108 MMHG | BODY MASS INDEX: 30.14 KG/M2

## 2019-01-11 DIAGNOSIS — I27.24 CTEPH (CHRONIC THROMBOEMBOLIC PULMONARY HYPERTENSION) (HCC): ICD-10-CM

## 2019-01-11 DIAGNOSIS — I10 ESSENTIAL HYPERTENSION: Chronic | ICD-10-CM

## 2019-01-11 DIAGNOSIS — R06.02 SOB (SHORTNESS OF BREATH): ICD-10-CM

## 2019-01-11 DIAGNOSIS — Z86.711 HISTORY OF PULMONARY EMBOLISM: ICD-10-CM

## 2019-01-11 DIAGNOSIS — I27.21 PAH (PULMONARY ARTERY HYPERTENSION) (HCC): ICD-10-CM

## 2019-01-11 DIAGNOSIS — E78.00 PURE HYPERCHOLESTEROLEMIA: Chronic | ICD-10-CM

## 2019-01-11 DIAGNOSIS — I27.24 CTEPH (CHRONIC THROMBOEMBOLIC PULMONARY HYPERTENSION) (HCC): Primary | ICD-10-CM

## 2019-01-11 DIAGNOSIS — R53.1 GENERALIZED WEAKNESS: ICD-10-CM

## 2019-01-11 LAB
ANION GAP SERPL CALCULATED.3IONS-SCNC: 12 MMOL/L (ref 3–16)
BUN BLDV-MCNC: 17 MG/DL (ref 7–20)
CALCIUM SERPL-MCNC: 10.1 MG/DL (ref 8.3–10.6)
CHLORIDE BLD-SCNC: 101 MMOL/L (ref 99–110)
CO2: 29 MMOL/L (ref 21–32)
CREAT SERPL-MCNC: 0.8 MG/DL (ref 0.6–1.2)
GFR AFRICAN AMERICAN: >60
GFR NON-AFRICAN AMERICAN: >60
GLUCOSE BLD-MCNC: 94 MG/DL (ref 70–99)
HCT VFR BLD CALC: 42.9 % (ref 36–48)
HEMOGLOBIN: 13.7 G/DL (ref 12–16)
INTERNATIONAL NORMALIZATION RATIO, POC: 1.9
MCH RBC QN AUTO: 26.5 PG (ref 26–34)
MCHC RBC AUTO-ENTMCNC: 32.1 G/DL (ref 31–36)
MCV RBC AUTO: 82.5 FL (ref 80–100)
PDW BLD-RTO: 19.2 % (ref 12.4–15.4)
PLATELET # BLD: 374 K/UL (ref 135–450)
PMV BLD AUTO: 10.3 FL (ref 5–10.5)
POTASSIUM SERPL-SCNC: 4.7 MMOL/L (ref 3.5–5.1)
PRO-BNP: 133 PG/ML (ref 0–449)
RBC # BLD: 5.2 M/UL (ref 4–5.2)
SODIUM BLD-SCNC: 142 MMOL/L (ref 136–145)
WBC # BLD: 8.1 K/UL (ref 4–11)

## 2019-01-11 PROCEDURE — 36415 COLL VENOUS BLD VENIPUNCTURE: CPT

## 2019-01-11 PROCEDURE — 83880 ASSAY OF NATRIURETIC PEPTIDE: CPT

## 2019-01-11 PROCEDURE — 99211 OFF/OP EST MAY X REQ PHY/QHP: CPT

## 2019-01-11 PROCEDURE — 85027 COMPLETE CBC AUTOMATED: CPT

## 2019-01-11 PROCEDURE — 85610 PROTHROMBIN TIME: CPT

## 2019-01-11 PROCEDURE — 80048 BASIC METABOLIC PNL TOTAL CA: CPT

## 2019-01-11 PROCEDURE — 99214 OFFICE O/P EST MOD 30 MIN: CPT | Performed by: INTERNAL MEDICINE

## 2019-01-22 ENCOUNTER — OFFICE VISIT (OUTPATIENT)
Dept: FAMILY MEDICINE CLINIC | Age: 84
End: 2019-01-22
Payer: MEDICARE

## 2019-01-22 VITALS
WEIGHT: 160 LBS | SYSTOLIC BLOOD PRESSURE: 138 MMHG | HEART RATE: 78 BPM | OXYGEN SATURATION: 95 % | BODY MASS INDEX: 29.26 KG/M2 | DIASTOLIC BLOOD PRESSURE: 86 MMHG

## 2019-01-22 DIAGNOSIS — J96.01 ACUTE RESPIRATORY FAILURE WITH HYPOXIA (HCC): ICD-10-CM

## 2019-01-22 DIAGNOSIS — I27.24 CTEPH (CHRONIC THROMBOEMBOLIC PULMONARY HYPERTENSION) (HCC): ICD-10-CM

## 2019-01-22 DIAGNOSIS — Z09 HOSPITAL DISCHARGE FOLLOW-UP: ICD-10-CM

## 2019-01-22 DIAGNOSIS — I82.90 VTE (VENOUS THROMBOEMBOLISM): Primary | ICD-10-CM

## 2019-01-22 DIAGNOSIS — Z86.711 HISTORY OF PULMONARY EMBOLISM: ICD-10-CM

## 2019-01-22 LAB
INR BLD: 1.68 (ref 0.86–1.14)
PROTHROMBIN TIME: 19.1 SEC (ref 9.8–13)

## 2019-01-22 PROCEDURE — 99214 OFFICE O/P EST MOD 30 MIN: CPT | Performed by: FAMILY MEDICINE

## 2019-01-22 ASSESSMENT — ENCOUNTER SYMPTOMS
GASTROINTESTINAL NEGATIVE: 1
SHORTNESS OF BREATH: 0
WHEEZING: 0

## 2019-01-22 ASSESSMENT — PATIENT HEALTH QUESTIONNAIRE - PHQ9
1. LITTLE INTEREST OR PLEASURE IN DOING THINGS: 0
2. FEELING DOWN, DEPRESSED OR HOPELESS: 0
SUM OF ALL RESPONSES TO PHQ9 QUESTIONS 1 & 2: 0
SUM OF ALL RESPONSES TO PHQ QUESTIONS 1-9: 0
SUM OF ALL RESPONSES TO PHQ QUESTIONS 1-9: 0

## 2019-01-23 ENCOUNTER — TELEPHONE (OUTPATIENT)
Dept: PHARMACY | Age: 84
End: 2019-01-23

## 2019-01-24 ENCOUNTER — TELEPHONE (OUTPATIENT)
Dept: PHARMACY | Age: 84
End: 2019-01-24

## 2019-01-25 ENCOUNTER — TELEPHONE (OUTPATIENT)
Dept: PHARMACY | Age: 84
End: 2019-01-25

## 2019-02-04 RX ORDER — RAMIPRIL 10 MG/1
CAPSULE ORAL
Qty: 90 CAPSULE | Refills: 3 | Status: SHIPPED | OUTPATIENT
Start: 2019-02-04 | End: 2019-02-20

## 2019-02-07 ENCOUNTER — ANTI-COAG VISIT (OUTPATIENT)
Dept: PHARMACY | Age: 84
End: 2019-02-07
Payer: MEDICARE

## 2019-02-07 DIAGNOSIS — Z86.711 HISTORY OF PULMONARY EMBOLISM: ICD-10-CM

## 2019-02-07 LAB — INTERNATIONAL NORMALIZATION RATIO, POC: 1.6

## 2019-02-07 PROCEDURE — 99212 OFFICE O/P EST SF 10 MIN: CPT

## 2019-02-07 PROCEDURE — 85610 PROTHROMBIN TIME: CPT

## 2019-02-20 ENCOUNTER — OFFICE VISIT (OUTPATIENT)
Dept: CARDIOLOGY CLINIC | Age: 84
End: 2019-02-20
Payer: MEDICARE

## 2019-02-20 ENCOUNTER — HOSPITAL ENCOUNTER (OUTPATIENT)
Dept: NON INVASIVE DIAGNOSTICS | Age: 84
Discharge: HOME | End: 2019-02-20
Payer: MEDICARE

## 2019-02-20 ENCOUNTER — ANTI-COAG VISIT (OUTPATIENT)
Dept: PHARMACY | Age: 84
End: 2019-02-20
Payer: MEDICARE

## 2019-02-20 VITALS
HEIGHT: 62 IN | HEART RATE: 74 BPM | OXYGEN SATURATION: 92 % | BODY MASS INDEX: 29.44 KG/M2 | WEIGHT: 160 LBS | SYSTOLIC BLOOD PRESSURE: 118 MMHG | DIASTOLIC BLOOD PRESSURE: 78 MMHG

## 2019-02-20 DIAGNOSIS — R06.02 SOB (SHORTNESS OF BREATH): ICD-10-CM

## 2019-02-20 DIAGNOSIS — Z86.711 HISTORY OF PULMONARY EMBOLISM: ICD-10-CM

## 2019-02-20 DIAGNOSIS — I10 ESSENTIAL HYPERTENSION: Chronic | ICD-10-CM

## 2019-02-20 DIAGNOSIS — I82.90 VTE (VENOUS THROMBOEMBOLISM): ICD-10-CM

## 2019-02-20 DIAGNOSIS — I27.24 CTEPH (CHRONIC THROMBOEMBOLIC PULMONARY HYPERTENSION) (HCC): Primary | ICD-10-CM

## 2019-02-20 DIAGNOSIS — I27.21 PAH (PULMONARY ARTERY HYPERTENSION) (HCC): ICD-10-CM

## 2019-02-20 LAB
INTERNATIONAL NORMALIZATION RATIO, POC: 2.3
LEFT VENTRICULAR EJECTION FRACTION HIGH VALUE: 70 %
LEFT VENTRICULAR EJECTION FRACTION MODE: NORMAL
LV EF: 65 %
LV EF: 68 %
LVEF MODALITY: NORMAL

## 2019-02-20 PROCEDURE — 99215 OFFICE O/P EST HI 40 MIN: CPT | Performed by: INTERNAL MEDICINE

## 2019-02-20 PROCEDURE — 99211 OFF/OP EST MAY X REQ PHY/QHP: CPT

## 2019-02-20 PROCEDURE — 85610 PROTHROMBIN TIME: CPT

## 2019-02-20 PROCEDURE — 93306 TTE W/DOPPLER COMPLETE: CPT

## 2019-02-21 ENCOUNTER — TELEPHONE (OUTPATIENT)
Dept: CARDIOLOGY CLINIC | Age: 84
End: 2019-02-21

## 2019-03-04 ENCOUNTER — TELEPHONE (OUTPATIENT)
Dept: PHARMACY | Age: 84
End: 2019-03-04

## 2019-03-04 ENCOUNTER — OFFICE VISIT (OUTPATIENT)
Dept: PULMONOLOGY | Age: 84
End: 2019-03-04
Payer: MEDICARE

## 2019-03-04 VITALS
DIASTOLIC BLOOD PRESSURE: 75 MMHG | RESPIRATION RATE: 20 BRPM | SYSTOLIC BLOOD PRESSURE: 122 MMHG | HEART RATE: 73 BPM | OXYGEN SATURATION: 96 %

## 2019-03-04 DIAGNOSIS — I27.24 CTEPH (CHRONIC THROMBOEMBOLIC PULMONARY HYPERTENSION) (HCC): Primary | ICD-10-CM

## 2019-03-04 PROCEDURE — 99213 OFFICE O/P EST LOW 20 MIN: CPT | Performed by: INTERNAL MEDICINE

## 2019-03-04 ASSESSMENT — ENCOUNTER SYMPTOMS
SHORTNESS OF BREATH: 1
VOICE CHANGE: 0
CONSTIPATION: 0
APNEA: 0
ANAL BLEEDING: 0
RHINORRHEA: 0
BLOOD IN STOOL: 0
ABDOMINAL DISTENTION: 0
ABDOMINAL PAIN: 0
SINUS PRESSURE: 0
CHOKING: 0
STRIDOR: 0
DIARRHEA: 0
CHEST TIGHTNESS: 0
SORE THROAT: 0
WHEEZING: 0
BACK PAIN: 0
COUGH: 0

## 2019-03-05 RX ORDER — FUROSEMIDE 20 MG/1
TABLET ORAL
Qty: 30 TABLET | Refills: 2 | Status: SHIPPED | OUTPATIENT
Start: 2019-03-05 | End: 2019-03-31 | Stop reason: SDUPTHER

## 2019-03-17 DIAGNOSIS — M41.25 OTHER IDIOPATHIC SCOLIOSIS, THORACOLUMBAR REGION: ICD-10-CM

## 2019-03-18 RX ORDER — TRAMADOL HYDROCHLORIDE 50 MG/1
TABLET ORAL
Qty: 60 TABLET | Refills: 2 | Status: SHIPPED | OUTPATIENT
Start: 2019-03-18 | End: 2019-05-21 | Stop reason: SDUPTHER

## 2019-03-19 ENCOUNTER — ANTI-COAG VISIT (OUTPATIENT)
Dept: PHARMACY | Age: 84
End: 2019-03-19
Payer: MEDICARE

## 2019-03-19 DIAGNOSIS — Z86.711 HISTORY OF PULMONARY EMBOLISM: ICD-10-CM

## 2019-03-19 LAB — INTERNATIONAL NORMALIZATION RATIO, POC: 1.9

## 2019-03-19 PROCEDURE — 85610 PROTHROMBIN TIME: CPT

## 2019-03-19 PROCEDURE — 99211 OFF/OP EST MAY X REQ PHY/QHP: CPT

## 2019-03-27 RX ORDER — HYDROCHLOROTHIAZIDE 25 MG/1
TABLET ORAL
Qty: 45 TABLET | Refills: 3 | Status: SHIPPED | OUTPATIENT
Start: 2019-03-27 | End: 2019-05-16

## 2019-04-01 ENCOUNTER — ANTI-COAG VISIT (OUTPATIENT)
Dept: PHARMACY | Age: 84
End: 2019-04-01
Payer: MEDICARE

## 2019-04-01 DIAGNOSIS — Z86.711 HISTORY OF PULMONARY EMBOLISM: ICD-10-CM

## 2019-04-01 LAB — INTERNATIONAL NORMALIZATION RATIO, POC: 2.2

## 2019-04-01 PROCEDURE — 85610 PROTHROMBIN TIME: CPT

## 2019-04-01 PROCEDURE — 99211 OFF/OP EST MAY X REQ PHY/QHP: CPT

## 2019-04-01 RX ORDER — FUROSEMIDE 20 MG/1
TABLET ORAL
Qty: 30 TABLET | Refills: 11 | Status: SHIPPED | OUTPATIENT
Start: 2019-04-01 | End: 2020-09-30

## 2019-04-29 ENCOUNTER — ANTI-COAG VISIT (OUTPATIENT)
Dept: PHARMACY | Age: 84
End: 2019-04-29
Payer: MEDICARE

## 2019-04-29 DIAGNOSIS — Z86.711 HISTORY OF PULMONARY EMBOLISM: ICD-10-CM

## 2019-04-29 LAB — INTERNATIONAL NORMALIZATION RATIO, POC: 2.4

## 2019-04-29 PROCEDURE — 99211 OFF/OP EST MAY X REQ PHY/QHP: CPT

## 2019-04-29 PROCEDURE — 85610 PROTHROMBIN TIME: CPT

## 2019-04-29 NOTE — PROGRESS NOTES
Ms. Jostin Hernandez is a 80 y.o. 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 Took Xarelto for the 3 years after her first PE. Then developed \"several small clots\" as it was described. She was then switched to warfarin 5mg as of 12/3/19. Pertinent PMH: HTN, Hx of PE,     Patient Reported Findings:  Yes     No  [x]   []       Patient & son verifies current dosing regimen as listed   []   [x]       S/S bleeding/bruising/swelling/SOB  []   [x]       Blood in urine or stool  []   [x]       Procedures scheduled in the future at this time  []   [x]       Missed Dose   []   [x]       Extra Dose  []   [x]       Change in medications   []   [x]       Change in health/diet/appetite  Continues with the breakfast drinks. She states that she has been eating her normal vegetables. She can not tolerate salads. []   [x]       Change in alcohol use  []   [x]       Change in activity  []   [x]       Hospital admission  []   [x]       Emergency department visit  []   [x]       Other complaints   Clinical Outcomes:  Yes     No  []   [x]       Major bleeding event  []   [x]       Thromboembolic event    Duration of warfarin Therapy: indefinitely  INR Range:  2.0-3.0    She presents with daughter today. INR is 2.4 today   Continue weekly dose of 5mg on Sun, Tue & Thu and 7.5 mg all other days  Encouraged to maintain a consistency of vegetables/salads.   Recheck INR in 5 weeks 6/3 d/t transportation concerns    Referring hematologist is Dr. Archie Ryan  INR (no units)   Date Value   04/29/2019 2.4   04/01/2019 2.2   03/19/2019 1.9   02/20/2019 2.3   01/22/2019 1.68 (H)   12/05/2018 1.11   12/04/2018 1.23 (H)   12/03/2018 1.81 (H)

## 2019-05-06 ENCOUNTER — TELEPHONE (OUTPATIENT)
Dept: OTHER | Age: 84
End: 2019-05-06

## 2019-05-16 ENCOUNTER — OFFICE VISIT (OUTPATIENT)
Dept: CARDIOLOGY CLINIC | Age: 84
End: 2019-05-16
Payer: MEDICARE

## 2019-05-16 ENCOUNTER — HOSPITAL ENCOUNTER (OUTPATIENT)
Age: 84
Discharge: HOME | End: 2019-05-16
Payer: MEDICARE

## 2019-05-16 VITALS
OXYGEN SATURATION: 92 % | SYSTOLIC BLOOD PRESSURE: 124 MMHG | DIASTOLIC BLOOD PRESSURE: 56 MMHG | WEIGHT: 156 LBS | HEIGHT: 62 IN | HEART RATE: 94 BPM | BODY MASS INDEX: 28.71 KG/M2 | RESPIRATION RATE: 16 BRPM

## 2019-05-16 DIAGNOSIS — I27.24 CTEPH (CHRONIC THROMBOEMBOLIC PULMONARY HYPERTENSION) (HCC): ICD-10-CM

## 2019-05-16 DIAGNOSIS — I10 ESSENTIAL HYPERTENSION: Chronic | ICD-10-CM

## 2019-05-16 DIAGNOSIS — I26.99 OTHER PULMONARY EMBOLISM WITHOUT ACUTE COR PULMONALE, UNSPECIFIED CHRONICITY (HCC): ICD-10-CM

## 2019-05-16 DIAGNOSIS — I27.24 CTEPH (CHRONIC THROMBOEMBOLIC PULMONARY HYPERTENSION) (HCC): Primary | ICD-10-CM

## 2019-05-16 DIAGNOSIS — R06.02 SOB (SHORTNESS OF BREATH): ICD-10-CM

## 2019-05-16 DIAGNOSIS — Z86.711 HISTORY OF PULMONARY EMBOLISM: ICD-10-CM

## 2019-05-16 DIAGNOSIS — I10 ESSENTIAL HYPERTENSION: ICD-10-CM

## 2019-05-16 DIAGNOSIS — I27.21 PAH (PULMONARY ARTERY HYPERTENSION) (HCC): ICD-10-CM

## 2019-05-16 LAB
A/G RATIO: 1.2 (ref 1.1–2.2)
ALBUMIN SERPL-MCNC: 4 G/DL (ref 3.4–5)
ALP BLD-CCNC: 81 U/L (ref 40–129)
ALT SERPL-CCNC: 17 U/L (ref 10–40)
ANION GAP SERPL CALCULATED.3IONS-SCNC: 13 MMOL/L (ref 3–16)
AST SERPL-CCNC: 21 U/L (ref 15–37)
BILIRUB SERPL-MCNC: 0.3 MG/DL (ref 0–1)
BUN BLDV-MCNC: 26 MG/DL (ref 7–20)
CALCIUM SERPL-MCNC: 10.4 MG/DL (ref 8.3–10.6)
CHLORIDE BLD-SCNC: 100 MMOL/L (ref 99–110)
CO2: 28 MMOL/L (ref 21–32)
CREAT SERPL-MCNC: 1.1 MG/DL (ref 0.6–1.2)
GFR AFRICAN AMERICAN: 57
GFR NON-AFRICAN AMERICAN: 47
GLOBULIN: 3.3 G/DL
GLUCOSE BLD-MCNC: 90 MG/DL (ref 70–99)
HCT VFR BLD CALC: 42.4 % (ref 36–48)
HEMOGLOBIN: 13.9 G/DL (ref 12–16)
MCH RBC QN AUTO: 27.6 PG (ref 26–34)
MCHC RBC AUTO-ENTMCNC: 32.8 G/DL (ref 31–36)
MCV RBC AUTO: 84.2 FL (ref 80–100)
PDW BLD-RTO: 19.4 % (ref 12.4–15.4)
PLATELET # BLD: 370 K/UL (ref 135–450)
PMV BLD AUTO: 9.9 FL (ref 5–10.5)
POTASSIUM SERPL-SCNC: 5.1 MMOL/L (ref 3.5–5.1)
PRO-BNP: 81 PG/ML (ref 0–449)
RBC # BLD: 5.04 M/UL (ref 4–5.2)
SODIUM BLD-SCNC: 141 MMOL/L (ref 136–145)
TOTAL PROTEIN: 7.3 G/DL (ref 6.4–8.2)
WBC # BLD: 6.7 K/UL (ref 4–11)

## 2019-05-16 PROCEDURE — 80053 COMPREHEN METABOLIC PANEL: CPT

## 2019-05-16 PROCEDURE — 99214 OFFICE O/P EST MOD 30 MIN: CPT | Performed by: CLINICAL NURSE SPECIALIST

## 2019-05-16 PROCEDURE — 83880 ASSAY OF NATRIURETIC PEPTIDE: CPT

## 2019-05-16 PROCEDURE — 36415 COLL VENOUS BLD VENIPUNCTURE: CPT

## 2019-05-16 PROCEDURE — 85027 COMPLETE CBC AUTOMATED: CPT

## 2019-05-16 NOTE — PATIENT INSTRUCTIONS
1.  Continue all current medications  2. Check blood work today  3.   RTO in 2-3 months with bisi vora

## 2019-05-16 NOTE — PROGRESS NOTES
[facility]  Progress Note    Primary Care Doctor:  Yohan Islas MD    Chief Complaint   Patient presents with   Rigoberto Corrigan     Denies cardiac symptoms. O2 @ 2L via NC continuous  (CTEPH/PHTN)    Hypertension        History of Present Illness:  80 y.o. female with history of HTN, HLD, PE (on xarelto since 2014 and then changed to coumadin per oncology, Dr Shelly Bird), thyroid disease, diagnosed with CTEPH on adempas, CKD, on home  Oxygen and Dr Kris Zhu pulmonary  She has 8 children    I had the pleasure of seeing Enma Aguirre in follow up for CTEPH. She is in a wheel chair with her daughter, Adonis Najjar today. We did call Jairo Sanderson, her son who is taking care of her medications. She was started on adempas at last appointment and is currently on 2.5 mg tid. She continues on 2 L of oxygen. She did not have any labs done. She denies any chest pain, palpitations, edema or lightheadedness. Past Medical History:   has a past medical history of Arthritis, Hyperlipidemia, Hypertension, Hypothyroidism, Movement disorder, Pulmonary emboli ([address] Scoliosis, Scoliosis, and Thyroid disease. Surgical History:   has a past surgical history that includes Colonoscopy (7/08) and joint replacement. Social History:   reports that she has quit smoking. Her smoking use included cigarettes. She has a 3.75 pack-year smoking history. She quit smokeless tobacco use about 53 years ago. She reports that she does not drink alcohol or use drugs. Family History:   Family History   Problem Relation Age of Onset    High Cholesterol Mother     High Cholesterol Father     Cancer Brother        Home Medications:  Prior to Admission medications    Medication Sig Start Date End Date Taking?  Authorizing Provider   Riociguat (ADEMPAS) 0.5 MG TABS Take 2.5 mg by mouth 3 times daily 5/16/19  Yes TAVON Hwang   lisinopril (PRINIVIL;ZESTRIL) 2.5 MG tablet TAKE 1 TABLET BY MOUTH EVERY DAY 4/4/19  Yes Tana Bronson Lisa Corrigan MD   furosemide (LASIX) 20 MG tablet TAKE 1 TABLET BY MOUTH EVERY DAY OR AS DIRECTED BY MD 4/1/19  Yes Clarence Crocker MD   OXYGEN Inhale 2 L into the lungs continuous   Yes Historical Provider, MD   amLODIPine (NORVASC) 2.5 MG tablet Take 1 tablet by mouth nightly 12/5/18  Yes Wendy Aleman MD   spironolactone (ALDACTONE) 25 MG tablet Take 0.5 tablets by mouth daily 12/6/18  Yes Wendy Aleman MD   warfarin (COUMADIN) 5 MG tablet Hold for  INR greater  Than 3.0  Patient taking differently: Hold for  INR greater  Than 3.0  Managed by [facility] 12/5/18  Yes Wendy Aleman MD   omeprazole (PRILOSEC) 20 MG delayed release capsule Take 20 mg by mouth daily   Yes Historical Provider, MD   levothyroxine (SYNTHROID) 75 MCG tablet TAKE 1 TABLET BY MOUTH EVERY DAY 10/25/18  Yes Artemio Dumont MD   docusate sodium (COLACE) 100 MG capsule Take 1 capsule by mouth daily as needed for Constipation 7/13/17  Yes Tammy Randle MD   Multiple Vitamins-Minerals (THERAPEUTIC MULTIVITAMIN-MINERALS) tablet Take 1 tablet by mouth daily   Yes Historical Provider, MD   Ascorbic Acid (VITAMIN C) 500 MG tablet Take 500 mg by mouth daily   Yes Historical Provider, MD        Allergies:  Naprosyn [naproxen]     Review of Systems:   · Constitutional: there has been no unanticipated weight loss. There's been no change in energy level, sleep pattern, or activity level. · Eyes: No visual changes or diplopia. No scleral icterus. · ENT: No Headaches, hearing loss or vertigo. No mouth sores or sore throat. · Cardiovascular: Reviewed in HPI  · Respiratory: No cough or wheezing, no sputum production. No hematemesis. · Gastrointestinal: No abdominal pain, appetite loss, blood in stools. No change in bowel or bladder habits. · Genitourinary: No dysuria, trouble voiding, or hematuria. · Musculoskeletal:  No gait disturbance, weakness or joint complaints. · Integumentary: No rash or pruritis.   · Neurological: No PE but enlarged main pulmonary artery which can be seen with pulmonary HTN    Assessment:    1. CTEPH (chronic thromboembolic pulmonary hypertension) ([facility] [address] on adempas    2. PAH (pulmonary artery hypertension) ([facility] [address]    3. Essential hypertension    4. Other pulmonary embolism without acute cor pulmonale, unspecified chronicity (HCC) on coumadin managed by [facility]       Plan:   Patient Instructions   1. Continue all current medications  2. Check blood work today  3. RTO in 2-3 months with 6 minute mathews walk    I appreciate the opportunity of cooperating in the care of this individual.    HUSSEIN Gamez, 5/16/2019, 6:00 PM    QUALITY MEASURES  1. Tobacco Cessation Counseling: na  2. Retake of BP if >140/90:  na  3. Documentation to PCP/referring for new patient:  Sent to PCP at close of office visit  4. CAD patient on anti-platelet:na  5. CAD patient on STATIN therapy:  na  6.  Patient with CHF and aFib on anticoagulation:  On coumadin but for PE

## 2019-05-17 ENCOUNTER — TELEPHONE (OUTPATIENT)
Dept: CARDIOLOGY CLINIC | Age: 84
End: 2019-05-17

## 2019-05-17 NOTE — TELEPHONE ENCOUNTER
KIRSTIN with results and instruction to continue current medications and call if any questions for patient's son, Nicole Abraham, who home number goes to. [place]

## 2019-05-21 DIAGNOSIS — M41.25 OTHER IDIOPATHIC SCOLIOSIS, THORACOLUMBAR REGION: ICD-10-CM

## 2019-05-21 RX ORDER — TRAMADOL HYDROCHLORIDE 50 MG/1
TABLET ORAL
Qty: 60 TABLET | Refills: 2 | Status: SHIPPED | OUTPATIENT
Start: 2019-05-21 | End: 2019-06-10 | Stop reason: SDUPTHER

## 2019-06-03 ENCOUNTER — PATIENT MESSAGE (OUTPATIENT)
Dept: PHARMACY | Age: 84
End: 2019-06-03

## 2019-06-03 ENCOUNTER — TELEPHONE (OUTPATIENT)
Dept: PULMONOLOGY | Age: 84
End: 2019-06-03

## 2019-06-03 ENCOUNTER — ANTI-COAG VISIT (OUTPATIENT)
Dept: PHARMACY | Age: 84
End: 2019-06-03
Payer: MEDICARE

## 2019-06-03 ENCOUNTER — OFFICE VISIT (OUTPATIENT)
Dept: PULMONOLOGY | Age: 84
End: 2019-06-03
Payer: MEDICARE

## 2019-06-03 VITALS
SYSTOLIC BLOOD PRESSURE: 110 MMHG | DIASTOLIC BLOOD PRESSURE: 72 MMHG | RESPIRATION RATE: 20 BRPM | OXYGEN SATURATION: 94 % | HEART RATE: 77 BPM

## 2019-06-03 DIAGNOSIS — I27.24 CTEPH (CHRONIC THROMBOEMBOLIC PULMONARY HYPERTENSION) (HCC): Primary | ICD-10-CM

## 2019-06-03 DIAGNOSIS — J96.11 CHRONIC RESPIRATORY FAILURE WITH HYPOXIA (HCC): ICD-10-CM

## 2019-06-03 DIAGNOSIS — Z86.711 HISTORY OF PULMONARY EMBOLISM: ICD-10-CM

## 2019-06-03 LAB — INTERNATIONAL NORMALIZATION RATIO, POC: 2.2

## 2019-06-03 PROCEDURE — 99211 OFF/OP EST MAY X REQ PHY/QHP: CPT

## 2019-06-03 PROCEDURE — 99213 OFFICE O/P EST LOW 20 MIN: CPT | Performed by: INTERNAL MEDICINE

## 2019-06-03 PROCEDURE — 85610 PROTHROMBIN TIME: CPT

## 2019-06-03 ASSESSMENT — ENCOUNTER SYMPTOMS
COUGH: 0
CHOKING: 0
SINUS PRESSURE: 0
SORE THROAT: 0
APNEA: 0
STRIDOR: 0
ABDOMINAL DISTENTION: 0
SHORTNESS OF BREATH: 1
DIARRHEA: 0
ANAL BLEEDING: 0
CHEST TIGHTNESS: 0
BACK PAIN: 0
RHINORRHEA: 0
VOICE CHANGE: 0
ABDOMINAL PAIN: 0
WHEEZING: 0
CONSTIPATION: 0
BLOOD IN STOOL: 0

## 2019-06-03 NOTE — PROGRESS NOTES
Ms. Maxx Antunez is a 80 y.o. 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 Took Xarelto for the 3 years after her first PE. Then developed \"several small clots\" as it was described. She was then switched to warfarin 5mg as of 12/3/19. Pertinent PMH: HTN, Hx of PE,     Patient Reported Findings:  Yes     No  [x]   []       Patient & son verifies current dosing regimen as listed   []   [x]       S/S bleeding/bruising/swelling/SOB  []   [x]       Blood in urine or stool  []   [x]       Procedures scheduled in the future at this time  []   [x]       Missed Dose   []   [x]       Extra Dose  []   [x]       Change in medications   []   [x]       Change in health/diet/appetite  Continues with the breakfast drinks. She states that she has been eating her normal vegetables. She can not tolerate salads. []   [x]       Change in alcohol use  []   [x]       Change in activity  []   [x]       Hospital admission  []   [x]       Emergency department visit  []   [x]       Other complaints   Clinical Outcomes:  Yes     No  []   [x]       Major bleeding event  []   [x]       Thromboembolic event    Duration of warfarin Therapy: indefinitely  INR Range:  2.0-3.0    She presents with daughter today. INR is 2.2 today   Continue weekly dose of 5mg on Sun, Tue & Thu and 7.5 mg all other days  Encouraged to maintain a consistency of vegetables/salads.   Recheck INR in 4 weeks 7/1    Referring hematologist is Dr. Sravani Fairbanks  INR (no units)   Date Value   06/03/2019 2.2   04/29/2019 2.4   04/01/2019 2.2   03/19/2019 1.9   01/22/2019 1.68 (H)   12/05/2018 1.11   12/04/2018 1.23 (H)   12/03/2018 1.81 (H)

## 2019-06-03 NOTE — TELEPHONE ENCOUNTER
[facility] called in stated there needs to be corrections on the script for the POC.  We need to fix the pulse setting to just say the number and not liter flow per minute and date as well as put a start date

## 2019-06-03 NOTE — PROGRESS NOTES
Gladys Figueroa    YOB: 1934     Date of Service:  6/3/2019     Chief Complaint   Patient presents with    Shortness of Breath     3 MOS F/UP          HPI patient doing quite well, using 2 L O2 continuously. Has had a fecal team obtaining portable concentrator or conserving device due to cost.  Dyspnea on exertion, denies any cough or phlegm. Minimal pedal edema. Allergies   Allergen Reactions    Naprosyn [Naproxen] Nausea Only     No outpatient medications have been marked as taking for the 6/3/19 encounter (Office Visit) with Adilene Mccain MD.       Immunization History   Administered Date(s) Administered    DT 10/18/2012    Influenza Virus Vaccine 11/21/2013, 09/23/2015    Influenza Whole 09/29/2011    Influenza, High Dose (Fluzone 65 yrs and older) 10/15/2012, 09/16/2014, 11/06/2016, 09/25/2017, 10/19/2018    Pneumococcal 13-valent Conjugate (Idgiipc80) 01/24/2017    Pneumococcal Polysaccharide (Gukekirhb41) 12/22/1997, 10/13/2011    Td, unspecified formulation 03/14/1983    Zoster Live (Zostavax) 11/21/2013       Past Medical History:   Diagnosis Date    Arthritis     Hyperlipidemia     Hypertension     Hypothyroidism     Movement disorder     scoliosis    Pulmonary emboli ([address] 12/2014    Scoliosis     Scoliosis     Thyroid disease     hypothyroid     Past Surgical History:   Procedure Laterality Date    COLONOSCOPY  7/08    JOINT REPLACEMENT      both knees     Family History   Problem Relation Age of Onset    High Cholesterol Mother     High Cholesterol Father     Cancer Brother        Review of Systems:  Review of Systems   Constitutional: Positive for fatigue. Negative for activity change, appetite change and fever. HENT: Negative for congestion, ear discharge, ear pain, postnasal drip, rhinorrhea, sinus pressure, sneezing, sore throat, tinnitus and voice change. Respiratory: Positive for shortness of breath.  Negative for apnea, cough, choking, chest tightness, wheezing and stridor. Cardiovascular: Positive for leg swelling. Negative for chest pain and palpitations. Gastrointestinal: Negative for abdominal distention, abdominal pain, anal bleeding, blood in stool, constipation and diarrhea. Musculoskeletal: Positive for gait problem. Negative for arthralgias and back pain. Skin: Negative for pallor and rash. Allergic/Immunologic: Negative for environmental allergies. Neurological: Negative for dizziness, tremors, seizures, syncope, speech difficulty, weakness, light-headedness, numbness and headaches. Hematological: Negative for adenopathy. Does not bruise/bleed easily. Psychiatric/Behavioral: Negative for sleep disturbance. Vitals:    06/03/19 1128   BP: 110/72   Pulse: 77   Resp: 20   SpO2: 94%     There is no height or weight on file to calculate BMI. Wt Readings from Last 3 Encounters:   05/16/19 156 lb (70.8 kg)   02/20/19 160 lb (72.6 kg)   01/22/19 160 lb (72.6 kg)     BP Readings from Last 3 Encounters:   06/03/19 110/72   05/16/19 (!) 124/56   03/04/19 122/75         Physical Exam   Constitutional: She is oriented to person, place, and time. She appears well-developed and well-nourished. No distress. HENT:   Mouth/Throat: Oropharynx is clear and moist. No oropharyngeal exudate. Cardiovascular: Normal rate, regular rhythm, normal heart sounds and intact distal pulses. No murmur heard. Pulmonary/Chest: Breath sounds normal. No respiratory distress. She has no wheezes. She has no rales. She exhibits no tenderness. Abdominal: She exhibits no distension and no mass. There is no tenderness. There is no rebound and no guarding. Musculoskeletal: She exhibits edema. She exhibits no deformity. Neurological: She is alert and oriented to person, place, and time. She displays normal reflexes. No cranial nerve deficit. She exhibits normal muscle tone. Coordination normal.   Skin: No rash noted. She is not diaphoretic.  No erythema. No pallor. Health Maintenance   Topic Date Due    DEXA (modify frequency per FRAX score)  11/04/1999    Shingles Vaccine (2 of 3) 01/16/2014    TSH testing  07/12/2018    Potassium monitoring  05/16/2020    Creatinine monitoring  05/16/2020    DTaP/Tdap/Td vaccine (2 - Tdap) 10/18/2022    Flu vaccine  Completed    Pneumococcal 65+ years Vaccine  Completed          Assessment/Plan:    Severe pulmonary hypertension, CTEPH currently on Riociguat. On home O2, will try to figure out if we can get the patient POC or conserving device. We will check with [facility]. Continue O2-2 L. Continues on Coumadin for anticoagulation for severe pulmonary hypertension related to CTEPH, not a surgical candidate. Follows with Dr. Phillip Brown. Patient also has moderate aortic stenosis. Return in about 1 year (around 6/3/2020).

## 2019-06-04 NOTE — TELEPHONE ENCOUNTER
From: Betty Leiva  To: Yanci Vickers, [facility]  Sent: 6/3/2019 9:37 PM EDT  Subject: Prescription Question    Reji Botello,    My mom Ludy Bernstein needs her Coumadin Rx. Can she get it through the anti coagulation clinic again like she did last time? Thanks.     Aleta Larose

## 2019-06-10 ENCOUNTER — OFFICE VISIT (OUTPATIENT)
Dept: FAMILY MEDICINE CLINIC | Age: 84
End: 2019-06-10
Payer: MEDICARE

## 2019-06-10 VITALS — DIASTOLIC BLOOD PRESSURE: 72 MMHG | SYSTOLIC BLOOD PRESSURE: 118 MMHG | HEART RATE: 88 BPM | OXYGEN SATURATION: 94 %

## 2019-06-10 DIAGNOSIS — M41.25 OTHER IDIOPATHIC SCOLIOSIS, THORACOLUMBAR REGION: ICD-10-CM

## 2019-06-10 DIAGNOSIS — I10 ESSENTIAL HYPERTENSION: Primary | ICD-10-CM

## 2019-06-10 DIAGNOSIS — R09.02 HYPOXIA: ICD-10-CM

## 2019-06-10 DIAGNOSIS — R41.3 MEMORY DISORDER: ICD-10-CM

## 2019-06-10 PROCEDURE — 99214 OFFICE O/P EST MOD 30 MIN: CPT | Performed by: FAMILY MEDICINE

## 2019-06-10 RX ORDER — TRAMADOL HYDROCHLORIDE 50 MG/1
50 TABLET ORAL EVERY 8 HOURS PRN
Qty: 90 TABLET | Refills: 2 | Status: SHIPPED | OUTPATIENT
Start: 2019-06-10 | End: 2019-10-23 | Stop reason: SDUPTHER

## 2019-06-10 ASSESSMENT — ENCOUNTER SYMPTOMS
BACK PAIN: 1
SHORTNESS OF BREATH: 0

## 2019-06-10 NOTE — PROGRESS NOTES
Subjective:      Patient ID: Petar Harry is a 80 y.o. female. HPI  Hypertension: Patient here for follow-up of elevated blood pressure. Blood pressure is not checked at home. Patient denies chest pain and irregular heart beat. She denies side effects from medication. Hypoxia: On 2 L of continuous oxygen  [facility] visit and med check. She continues to need med for back pain and maintenance of ADL's and quality of life. She is only taking 2 tramadol a day and does feel like she could use more at times  Memory disorder: Her daughter states with the oxygen this is been better. She does not feel she needs any medication  Review of Systems   Constitutional: Positive for activity change (generally inactive). Respiratory: Negative for shortness of breath (not with O2). Musculoskeletal: Positive for back pain. Objective:   Physical Exam   Constitutional: She is oriented to person, place, and time. No distress. Neck: Neck supple. Carotid bruit is not present. No thyromegaly present. Cardiovascular: Normal rate and regular rhythm. Murmur heard. Pulmonary/Chest: Effort normal and breath sounds normal. She has no wheezes. She has no rales. Musculoskeletal: She exhibits no edema. Lymphadenopathy:     She has no cervical adenopathy. Neurological: She is alert and oriented to person, place, and time. Skin: Skin is warm and dry. Psychiatric: She has a normal mood and affect. Her behavior is normal. Judgment and thought content normal.     Assessment/Plan:    Crispin Eason was seen today for follow-up. Diagnoses and all orders for this visit:    Essential hypertension  Reasonably well controlled  Continue current treatment  Home BP checks  Return 4 months     Hypoxia  Stable on 2 L of oxygen.   Continue current treatment  Other idiopathic scoliosis, thoracolumbar region  Increase tramadol from every 12 hours to every 8 hours and prescription from 60 tabs to 90 tabs  -     traMADol (ULTRAM) 50 MG tablet;

## 2019-06-10 NOTE — PATIENT INSTRUCTIONS
Patient Education        DASH Diet: Care Instructions  Your Care Instructions    The DASH diet is an eating plan that can help lower your blood pressure. DASH stands for Dietary Approaches to Stop Hypertension. Hypertension is high blood pressure. The DASH diet focuses on eating foods that are high in calcium, potassium, and magnesium. These nutrients can lower blood pressure. The foods that are highest in these nutrients are fruits, vegetables, low-fat dairy products, nuts, seeds, and legumes. But taking calcium, potassium, and magnesium supplements instead of eating foods that are high in those nutrients does not have the same effect. The DASH diet also includes whole grains, fish, and poultry. The DASH diet is one of several lifestyle changes your doctor may recommend to lower your high blood pressure. Your doctor may also want you to decrease the amount of sodium in your diet. Lowering sodium while following the DASH diet can lower blood pressure even further than just the DASH diet alone. Follow-up care is a key part of your treatment and safety. Be sure to make and go to all appointments, and call your doctor if you are having problems. It's also a good idea to know your test results and keep a list of the medicines you take. How can you care for yourself at home? Following the DASH diet  · Eat 4 to 5 servings of fruit each day. A serving is 1 medium-sized piece of fruit, ½ cup chopped or canned fruit, 1/4 cup dried fruit, or 4 ounces (½ cup) of fruit juice. Choose fruit more often than fruit juice. · Eat 4 to 5 servings of vegetables each day. A serving is 1 cup of lettuce or raw leafy vegetables, ½ cup of chopped or cooked vegetables, or 4 ounces (½ cup) of vegetable juice. Choose vegetables more often than vegetable juice. · Get 2 to 3 servings of low-fat and fat-free dairy each day. A serving is 8 ounces of milk, 1 cup of yogurt, or 1 ½ ounces of cheese. · Eat 6 to 8 servings of grains each day. meals using beans and peas. Add garbanzo or kidney beans to salads. Make burritos and tacos with mashed cummins beans or black beans. Where can you learn more? Go to https://[email or web address].[employer]. org and sign in to your [employer] account. Enter K294 in the picoChip box to learn more about \"DASH Diet: Care Instructions. \"     If you do not have an account, please click on the \"Sign Up Now\" link. Current as of: July 22, 2018  Content Version: 12.0  © 3499-5173 Healthwise, Incorporated. Care instructions adapted under license by [facility] ([facility]). If you have questions about a medical condition or this instruction, always ask your healthcare professional. [address] any warranty or liability for your use of this information.

## 2019-07-01 ENCOUNTER — ANTI-COAG VISIT (OUTPATIENT)
Dept: PHARMACY | Age: 84
End: 2019-07-01
Payer: MEDICARE

## 2019-07-01 DIAGNOSIS — Z86.711 HISTORY OF PULMONARY EMBOLISM: ICD-10-CM

## 2019-07-01 LAB — INTERNATIONAL NORMALIZATION RATIO, POC: 2.1

## 2019-07-01 PROCEDURE — 85610 PROTHROMBIN TIME: CPT

## 2019-07-01 PROCEDURE — 99211 OFF/OP EST MAY X REQ PHY/QHP: CPT

## 2019-07-01 NOTE — PROGRESS NOTES
Ms. Antonio Swain is a 80 y.o. 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 Took Xarelto for the 3 years after her first PE. Then developed \"several small clots\" as it was described. She was then switched to warfarin 5mg as of 12/3/19. Pertinent PMH: HTN, Hx of PE,     Patient Reported Findings:  Yes     No  [x]   []       Patient & son verifies current dosing regimen as listed   []   [x]       S/S bleeding/bruising/swelling/SOB  []   [x]       Blood in urine or stool  []   [x]       Procedures scheduled in the future at this time  []   [x]       Missed Dose   []   [x]       Extra Dose  []   [x]       Change in medications   []   [x]       Change in health/diet/appetite  Continues with the breakfast drinks. She states that she has been eating her normal vegetables. She can not tolerate salads. []   [x]       Change in alcohol use  []   [x]       Change in activity  []   [x]       Hospital admission  []   [x]       Emergency department visit  []   [x]       Other complaints   Clinical Outcomes:  Yes     No  []   [x]       Major bleeding event  []   [x]       Thromboembolic event    Duration of warfarin Therapy: indefinitely  INR Range:  2.0-3.0    She presents with daughter today. INR is 2.1 today   Continue weekly dose of 5mg on Sun, Tue & Thu and 7.5 mg all other days  Encouraged to maintain a consistency of vegetables/salads.   Recheck INR in 4 weeks 7/29    Referring hematologist is Dr. Erica Rios  INR (no units)   Date Value   07/01/2019 2.1   06/03/2019 2.2   04/29/2019 2.4   04/01/2019 2.2   01/22/2019 1.68 (H)   12/05/2018 1.11   12/04/2018 1.23 (H)   12/03/2018 1.81 (H)

## 2019-07-10 ENCOUNTER — TELEPHONE (OUTPATIENT)
Dept: CARDIOLOGY CLINIC | Age: 84
End: 2019-07-10

## 2019-07-15 ENCOUNTER — OFFICE VISIT (OUTPATIENT)
Dept: CARDIOLOGY CLINIC | Age: 84
End: 2019-07-15
Payer: MEDICARE

## 2019-07-15 VITALS
HEIGHT: 60 IN | SYSTOLIC BLOOD PRESSURE: 116 MMHG | DIASTOLIC BLOOD PRESSURE: 66 MMHG | BODY MASS INDEX: 30.47 KG/M2 | HEART RATE: 82 BPM

## 2019-07-15 DIAGNOSIS — I10 ESSENTIAL HYPERTENSION: ICD-10-CM

## 2019-07-15 DIAGNOSIS — I26.99 OTHER PULMONARY EMBOLISM WITHOUT ACUTE COR PULMONALE, UNSPECIFIED CHRONICITY (HCC): ICD-10-CM

## 2019-07-15 DIAGNOSIS — I27.21 PAH (PULMONARY ARTERY HYPERTENSION) (HCC): ICD-10-CM

## 2019-07-15 DIAGNOSIS — I27.24 CTEPH (CHRONIC THROMBOEMBOLIC PULMONARY HYPERTENSION) (HCC): Primary | ICD-10-CM

## 2019-07-15 PROCEDURE — 99214 OFFICE O/P EST MOD 30 MIN: CPT | Performed by: CLINICAL NURSE SPECIALIST

## 2019-07-18 ENCOUNTER — PATIENT MESSAGE (OUTPATIENT)
Dept: CARDIOLOGY CLINIC | Age: 84
End: 2019-07-18

## 2019-07-18 NOTE — TELEPHONE ENCOUNTER
From: Jazmine Silva  To: Elizabeth Diaz MD  Sent: 7/18/2019 4:44 PM EDT  Subject: Prescription Question    I requested a refill of my mom's Adempas medication last week and have not received it. She is almost completely out of it. We are using doses from all of her past prescriptions to piece together the 2.5mg tid. Need it ASAP!     Lexi Ann   [phone]

## 2019-08-05 ENCOUNTER — ANTI-COAG VISIT (OUTPATIENT)
Dept: PHARMACY | Age: 84
End: 2019-08-05
Payer: MEDICARE

## 2019-08-05 DIAGNOSIS — Z86.711 HISTORY OF PULMONARY EMBOLISM: ICD-10-CM

## 2019-08-05 LAB — INTERNATIONAL NORMALIZATION RATIO, POC: 2

## 2019-08-05 PROCEDURE — 99211 OFF/OP EST MAY X REQ PHY/QHP: CPT

## 2019-08-05 PROCEDURE — 85610 PROTHROMBIN TIME: CPT

## 2019-08-05 NOTE — PROGRESS NOTES
Ms. Jorge Marmolejo is a 80 y.o. 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 Took Xarelto for the 3 years after her first PE. Then developed \"several small clots\" as it was described. She was then switched to warfarin 5mg as of 12/3/19. Pertinent PMH: HTN, Hx of PE,     Patient Reported Findings:  Yes     No  [x]   []       Patient & son verifies current dosing regimen as listed   []   [x]       S/S bleeding/bruising/swelling/SOB  []   [x]       Blood in urine or stool  []   [x]       Procedures scheduled in the future at this time  []   [x]       Missed Dose   []   [x]       Extra Dose  []   [x]       Change in medications   []   [x]       Change in health/diet/appetite  Continues with the breakfast drinks. She states that she has been eating her normal vegetables. She can not tolerate salads. []   [x]       Change in alcohol use  []   [x]       Change in activity  []   [x]       Hospital admission  []   [x]       Emergency department visit  []   [x]       Other complaints   Clinical Outcomes:  Yes     No  []   [x]       Major bleeding event  []   [x]       Thromboembolic event    Duration of warfarin Therapy: indefinitely  INR Range:  2.0-3.0    She presents with daughter today. INR is 2 today   Continue weekly dose of 5mg on Sun, Tue & Thu and 7.5 mg all other days  Encouraged to maintain a consistency of vegetables/salads.   Recheck INR in 5 weeks 9/9 d/t holiday     Referring hematologist is Dr. Mohr [facility]  INR (no units)   Date Value   08/05/2019 2   07/01/2019 2.1   06/03/2019 2.2   04/29/2019 2.4   01/22/2019 1.68 (H)   12/05/2018 1.11   12/04/2018 1.23 (H)   12/03/2018 1.81 (H)

## 2019-08-07 RX ORDER — SPIRONOLACTONE 25 MG/1
12.5 TABLET ORAL DAILY
Qty: 30 TABLET | Refills: 3 | Status: SHIPPED | OUTPATIENT
Start: 2019-08-07 | End: 2020-03-25

## 2019-08-22 ENCOUNTER — OFFICE VISIT (OUTPATIENT)
Dept: SURGERY | Age: 84
End: 2019-08-22
Payer: MEDICARE

## 2019-08-22 VITALS — SYSTOLIC BLOOD PRESSURE: 103 MMHG | DIASTOLIC BLOOD PRESSURE: 60 MMHG

## 2019-08-22 DIAGNOSIS — R22.1 MASS OF RIGHT SIDE OF NECK: Primary | ICD-10-CM

## 2019-08-22 DIAGNOSIS — R22.32 ARM MASS, LEFT: ICD-10-CM

## 2019-08-22 PROCEDURE — 99212 OFFICE O/P EST SF 10 MIN: CPT | Performed by: SURGERY

## 2019-09-04 ENCOUNTER — TELEPHONE (OUTPATIENT)
Dept: CARDIOLOGY CLINIC | Age: 84
End: 2019-09-04

## 2019-09-04 NOTE — TELEPHONE ENCOUNTER
Please advise. Plan from [address] note on 8/22/19  ASSESSMENT AND PLAN:  Skin lesions, likely basal cell ca. Pt with some swelling and intermittent drainage at the sites.  No new pain or sx's  Will consider excision  Will call back when ready  DW pt and daughter, all Q answered     Procedure   EXCISION OF RIGHT NECK MASS   EXCISION LEFT FOREARM MASS

## 2019-09-09 ENCOUNTER — ANTI-COAG VISIT (OUTPATIENT)
Dept: PHARMACY | Age: 84
End: 2019-09-09
Payer: MEDICARE

## 2019-09-09 ENCOUNTER — TELEPHONE (OUTPATIENT)
Dept: PHARMACY | Age: 84
End: 2019-09-09

## 2019-09-09 DIAGNOSIS — Z86.711 HISTORY OF PULMONARY EMBOLISM: ICD-10-CM

## 2019-09-09 LAB — INTERNATIONAL NORMALIZATION RATIO, POC: 1.4

## 2019-09-09 PROCEDURE — 99212 OFFICE O/P EST SF 10 MIN: CPT

## 2019-09-09 PROCEDURE — 85610 PROTHROMBIN TIME: CPT

## 2019-09-09 NOTE — PROGRESS NOTES
then resume normal weekly dose of warfarin. Encouraged to maintain a consistency of vegetables/salads. Called [facility] to receive authorization to hold, awaiting response back.    Recheck INR in 3 weeks 9/30    Referring hematologist is Dr. Priscilla Garnica  INR (no units)   Date Value   09/09/2019 1.4   08/05/2019 2   07/01/2019 2.1   06/03/2019 2.2   01/22/2019 1.68 (H)   12/05/2018 1.11   12/04/2018 1.23 (H)   12/03/2018 1.81 (H)

## 2019-09-10 ENCOUNTER — TELEPHONE (OUTPATIENT)
Dept: PHARMACY | Age: 84
End: 2019-09-10

## 2019-09-16 NOTE — PROGRESS NOTES
Name_______________________________________Printed:____________________  Date and time of surgery [id number]________________________Bqhvxua Time:__0930 MAIN______________   1. Do not eat or drink anything after 12 midnight (or____hours) prior to surgery. This includes no water, chewing gum or mints. Endoscopy patients follow your doctors bowel prep instructions,which may include taking part of prep after midnight. 2. Take the following pills with a small sip of water on the morning of surgery__ADEMPAS, THYROID, OMEPRAZOLE  _________________________________________________                  Do not take blood pressure medications ending in pril or sartan the sergey prior to surgery or the morning of surgery_   3. Aspirin, Ibuprofen, Advil, Naproxen, Vitamin E and other Anti-inflammatory products should be stopped for 5 days before surgery or as directed by your physician. 4. Check with your Doctor regarding stopping Plavix, Coumadin,Eliquis, Lovenox,Effient,Pradaxa,Xarelto, Fragmin or other blood thinners and follow their instructions. 5. Do not smoke, and do not drink any alcoholic beverages 24 hours prior to surgery. This includes NA Beer. Refrain from the usage of any recreational drugs. 6. You may brush your teeth and gargle the morning of surgery. DO NOT SWALLOW WATER   7. You MUST make arrangements for a responsible adult to stay on site while you are here and take you home after your surgery. You will not be allowed to leave alone or drive yourself home. It is strongly suggested someone stay with you the first 24 hrs. Your surgery will be cancelled if you do not have a ride home. 8. A parent/legal guardian must accompany a child scheduled for surgery and plan to stay at the hospital until the child is discharged. Please do not bring other children with you.    9. Please wear simple, loose fitting clothing to the hospital.  Do not bring valuables (money, credit cards, checkbooks, etc.) Do not wear any makeup on site to fill your prescriptions. 24. If you use oxygen and have a portable tank please bring it  with you the DOS             25. Bring a complete list of all your medications with name and dose include any supplements. 26. Other__________________________________________   *Please call pre admission testing if you any further questions   [address] [place]  [phone]   [address] [place] [address]       All above information reviewed with patient in person or by phone. Patient verbalizes understanding. All questions and concerns addressed.                                                                                                  Patient/Rep___DTR HANNAH_________________                                                                                                                                    PRE OP INSTRUCTIONS

## 2019-09-18 ENCOUNTER — ANESTHESIA (OUTPATIENT)
Dept: OPERATING ROOM | Age: 84
End: 2019-09-18
Payer: MEDICARE

## 2019-09-18 ENCOUNTER — ANESTHESIA EVENT (OUTPATIENT)
Dept: OPERATING ROOM | Age: 84
End: 2019-09-18
Payer: MEDICARE

## 2019-09-18 ENCOUNTER — HOSPITAL ENCOUNTER (OUTPATIENT)
Age: 84
Setting detail: OUTPATIENT SURGERY
Discharge: HOME | End: 2019-09-18
Attending: SURGERY | Admitting: SURGERY
Payer: MEDICARE

## 2019-09-18 VITALS
HEIGHT: 65 IN | WEIGHT: 150 LBS | BODY MASS INDEX: 24.99 KG/M2 | HEART RATE: 100 BPM | SYSTOLIC BLOOD PRESSURE: 113 MMHG | DIASTOLIC BLOOD PRESSURE: 45 MMHG | TEMPERATURE: 98.1 F | RESPIRATION RATE: 16 BRPM | OXYGEN SATURATION: 93 %

## 2019-09-18 VITALS — SYSTOLIC BLOOD PRESSURE: 107 MMHG | OXYGEN SATURATION: 96 % | DIASTOLIC BLOOD PRESSURE: 57 MMHG

## 2019-09-18 PROCEDURE — 7100000011 HC PHASE II RECOVERY - ADDTL 15 MIN: Performed by: SURGERY

## 2019-09-18 PROCEDURE — 6360000002 HC RX W HCPCS: Performed by: SURGERY

## 2019-09-18 PROCEDURE — 3700000000 HC ANESTHESIA ATTENDED CARE: Performed by: SURGERY

## 2019-09-18 PROCEDURE — 3700000001 HC ADD 15 MINUTES (ANESTHESIA): Performed by: SURGERY

## 2019-09-18 PROCEDURE — 3600000013 HC SURGERY LEVEL 3 ADDTL 15MIN: Performed by: SURGERY

## 2019-09-18 PROCEDURE — 2580000003 HC RX 258: Performed by: NURSE ANESTHETIST, CERTIFIED REGISTERED

## 2019-09-18 PROCEDURE — 7100000000 HC PACU RECOVERY - FIRST 15 MIN: Performed by: SURGERY

## 2019-09-18 PROCEDURE — 11606 EXC TR-EXT MAL+MARG >4 CM: CPT | Performed by: SURGERY

## 2019-09-18 PROCEDURE — 2500000003 HC RX 250 WO HCPCS: Performed by: NURSE ANESTHETIST, CERTIFIED REGISTERED

## 2019-09-18 PROCEDURE — 3600000003 HC SURGERY LEVEL 3 BASE: Performed by: SURGERY

## 2019-09-18 PROCEDURE — 88307 TISSUE EXAM BY PATHOLOGIST: CPT

## 2019-09-18 PROCEDURE — 11626 EXC S/N/H/F/G MAL+MRG >4 CM: CPT | Performed by: SURGERY

## 2019-09-18 PROCEDURE — 6360000002 HC RX W HCPCS: Performed by: NURSE ANESTHETIST, CERTIFIED REGISTERED

## 2019-09-18 PROCEDURE — 2500000003 HC RX 250 WO HCPCS: Performed by: SURGERY

## 2019-09-18 PROCEDURE — 7100000001 HC PACU RECOVERY - ADDTL 15 MIN: Performed by: SURGERY

## 2019-09-18 PROCEDURE — 2580000003 HC RX 258: Performed by: ANESTHESIOLOGY

## 2019-09-18 PROCEDURE — 2709999900 HC NON-CHARGEABLE SUPPLY: Performed by: SURGERY

## 2019-09-18 PROCEDURE — 7100000010 HC PHASE II RECOVERY - FIRST 15 MIN: Performed by: SURGERY

## 2019-09-18 RX ORDER — BUPIVACAINE HYDROCHLORIDE AND EPINEPHRINE 5; 5 MG/ML; UG/ML
INJECTION, SOLUTION EPIDURAL; INTRACAUDAL; PERINEURAL
Status: DISCONTINUED | OUTPATIENT
Start: 2019-09-18 | End: 2019-09-18 | Stop reason: ALTCHOICE

## 2019-09-18 RX ORDER — PROPOFOL 10 MG/ML
INJECTION, EMULSION INTRAVENOUS PRN
Status: DISCONTINUED | OUTPATIENT
Start: 2019-09-18 | End: 2019-09-18 | Stop reason: SDUPTHER

## 2019-09-18 RX ORDER — LIDOCAINE HYDROCHLORIDE AND EPINEPHRINE 10; 10 MG/ML; UG/ML
INJECTION, SOLUTION INFILTRATION; PERINEURAL
Status: COMPLETED | OUTPATIENT
Start: 2019-09-18 | End: 2019-09-18

## 2019-09-18 RX ORDER — CEFAZOLIN SODIUM 2 G/100ML
2 INJECTION, SOLUTION INTRAVENOUS
Status: COMPLETED | OUTPATIENT
Start: 2019-09-18 | End: 2019-09-18

## 2019-09-18 RX ORDER — ONDANSETRON 2 MG/ML
4 INJECTION INTRAMUSCULAR; INTRAVENOUS
Status: DISCONTINUED | OUTPATIENT
Start: 2019-09-18 | End: 2019-09-18 | Stop reason: HOSPADM

## 2019-09-18 RX ORDER — FENTANYL CITRATE 50 UG/ML
25 INJECTION, SOLUTION INTRAMUSCULAR; INTRAVENOUS EVERY 5 MIN PRN
Status: DISCONTINUED | OUTPATIENT
Start: 2019-09-18 | End: 2019-09-18 | Stop reason: HOSPADM

## 2019-09-18 RX ORDER — HYDROMORPHONE HCL 110MG/55ML
0.3 PATIENT CONTROLLED ANALGESIA SYRINGE INTRAVENOUS EVERY 5 MIN PRN
Status: DISCONTINUED | OUTPATIENT
Start: 2019-09-18 | End: 2019-09-18 | Stop reason: HOSPADM

## 2019-09-18 RX ORDER — ETOMIDATE 2 MG/ML
INJECTION INTRAVENOUS PRN
Status: DISCONTINUED | OUTPATIENT
Start: 2019-09-18 | End: 2019-09-18 | Stop reason: SDUPTHER

## 2019-09-18 RX ORDER — SODIUM CHLORIDE 0.9 % (FLUSH) 0.9 %
10 SYRINGE (ML) INJECTION EVERY 12 HOURS SCHEDULED
Status: DISCONTINUED | OUTPATIENT
Start: 2019-09-18 | End: 2019-09-18 | Stop reason: HOSPADM

## 2019-09-18 RX ORDER — ONDANSETRON 2 MG/ML
INJECTION INTRAMUSCULAR; INTRAVENOUS PRN
Status: DISCONTINUED | OUTPATIENT
Start: 2019-09-18 | End: 2019-09-18 | Stop reason: SDUPTHER

## 2019-09-18 RX ORDER — SODIUM CHLORIDE, SODIUM LACTATE, POTASSIUM CHLORIDE, CALCIUM CHLORIDE 600; 310; 30; 20 MG/100ML; MG/100ML; MG/100ML; MG/100ML
INJECTION, SOLUTION INTRAVENOUS CONTINUOUS
Status: DISCONTINUED | OUTPATIENT
Start: 2019-09-18 | End: 2019-09-18 | Stop reason: HOSPADM

## 2019-09-18 RX ORDER — SODIUM CHLORIDE 9 MG/ML
INJECTION, SOLUTION INTRAVENOUS CONTINUOUS PRN
Status: DISCONTINUED | OUTPATIENT
Start: 2019-09-18 | End: 2019-09-18 | Stop reason: SDUPTHER

## 2019-09-18 RX ORDER — HYDRALAZINE HYDROCHLORIDE 20 MG/ML
5 INJECTION INTRAMUSCULAR; INTRAVENOUS EVERY 10 MIN PRN
Status: DISCONTINUED | OUTPATIENT
Start: 2019-09-18 | End: 2019-09-18 | Stop reason: HOSPADM

## 2019-09-18 RX ORDER — FENTANYL CITRATE 50 UG/ML
INJECTION, SOLUTION INTRAMUSCULAR; INTRAVENOUS PRN
Status: DISCONTINUED | OUTPATIENT
Start: 2019-09-18 | End: 2019-09-18 | Stop reason: SDUPTHER

## 2019-09-18 RX ORDER — LIDOCAINE HYDROCHLORIDE 20 MG/ML
INJECTION, SOLUTION EPIDURAL; INFILTRATION; INTRACAUDAL; PERINEURAL PRN
Status: DISCONTINUED | OUTPATIENT
Start: 2019-09-18 | End: 2019-09-18 | Stop reason: SDUPTHER

## 2019-09-18 RX ORDER — PROPOFOL 10 MG/ML
INJECTION, EMULSION INTRAVENOUS CONTINUOUS PRN
Status: DISCONTINUED | OUTPATIENT
Start: 2019-09-18 | End: 2019-09-18 | Stop reason: SDUPTHER

## 2019-09-18 RX ORDER — LABETALOL HYDROCHLORIDE 5 MG/ML
5 INJECTION, SOLUTION INTRAVENOUS EVERY 10 MIN PRN
Status: DISCONTINUED | OUTPATIENT
Start: 2019-09-18 | End: 2019-09-18 | Stop reason: HOSPADM

## 2019-09-18 RX ORDER — LIDOCAINE HYDROCHLORIDE 10 MG/ML
1 INJECTION, SOLUTION EPIDURAL; INFILTRATION; INTRACAUDAL; PERINEURAL
Status: DISCONTINUED | OUTPATIENT
Start: 2019-09-18 | End: 2019-09-18 | Stop reason: HOSPADM

## 2019-09-18 RX ORDER — PROCHLORPERAZINE EDISYLATE 5 MG/ML
5 INJECTION INTRAMUSCULAR; INTRAVENOUS
Status: DISCONTINUED | OUTPATIENT
Start: 2019-09-18 | End: 2019-09-18 | Stop reason: HOSPADM

## 2019-09-18 RX ORDER — SODIUM CHLORIDE 0.9 % (FLUSH) 0.9 %
10 SYRINGE (ML) INJECTION PRN
Status: DISCONTINUED | OUTPATIENT
Start: 2019-09-18 | End: 2019-09-18 | Stop reason: HOSPADM

## 2019-09-18 RX ADMIN — CEFAZOLIN SODIUM 2 G: 2 INJECTION, SOLUTION INTRAVENOUS at 11:10

## 2019-09-18 RX ADMIN — PROPOFOL 80 MCG/KG/MIN: 10 INJECTION, EMULSION INTRAVENOUS at 11:18

## 2019-09-18 RX ADMIN — FENTANYL CITRATE 25 MCG: 50 INJECTION, SOLUTION INTRAMUSCULAR; INTRAVENOUS at 11:22

## 2019-09-18 RX ADMIN — PROPOFOL 10 MG: 10 INJECTION, EMULSION INTRAVENOUS at 11:18

## 2019-09-18 RX ADMIN — ETOMIDATE 2 MG: 20 INJECTION, SOLUTION INTRAVENOUS at 11:18

## 2019-09-18 RX ADMIN — LIDOCAINE HYDROCHLORIDE 60 MG: 20 INJECTION, SOLUTION EPIDURAL; INFILTRATION; INTRACAUDAL; PERINEURAL at 11:18

## 2019-09-18 RX ADMIN — SODIUM CHLORIDE: 9 INJECTION, SOLUTION INTRAVENOUS at 11:12

## 2019-09-18 RX ADMIN — ONDANSETRON 4 MG: 2 INJECTION INTRAMUSCULAR; INTRAVENOUS at 11:31

## 2019-09-18 RX ADMIN — ETOMIDATE 2 MG: 20 INJECTION, SOLUTION INTRAVENOUS at 11:34

## 2019-09-18 ASSESSMENT — PULMONARY FUNCTION TESTS
PIF_VALUE: 1
PIF_VALUE: 0
PIF_VALUE: 1
PIF_VALUE: 1

## 2019-09-18 ASSESSMENT — ENCOUNTER SYMPTOMS: SHORTNESS OF BREATH: 0

## 2019-09-18 ASSESSMENT — PAIN - FUNCTIONAL ASSESSMENT: PAIN_FUNCTIONAL_ASSESSMENT: 0-10

## 2019-09-18 NOTE — H&P
[facility] 83 and Laparoscopic Surgery      I have reviewed the history and physical and examined the patient and find no relevant changes. I have reviewed with the patient and/or family the risks, benefits, and alternatives to the procedure.     Sites marked  Heart: RRR  Lungs: clear    Frances Morgan MD  9/18/2019

## 2019-09-18 NOTE — PROGRESS NOTES
Pt arrived to PACU from OR in stable condition and is alert. Pt can move extremities to command. Respirations are even on 2L O2 per NC; as is her baseline. Skin warm, dry, and with appropriate for ethnicity color. Abd is soft. Pain is tolerable at this time.   Left arm and right nick surgical site(s) intact with dressing= Skin Affix surgical glue, well approximated, open to air        S/P: right neck (anterior, below chin) and left lower arm excision of mass with Dr. Celina Becker at [address] [place], RN, [facility]  PACU, Pre-op, SDS

## 2019-09-18 NOTE — ANESTHESIA PRE PROCEDURE
Department of Anesthesiology  Preprocedure Note       Name:  Alan Kirkpatrick   Age:  80 y.o.  :  1934                                          MRN:  [id number]         Date:  2019      Surgeon: Katy Moreno):  Jacob Bolaños MD    Procedure: EXCISION OF RIGHT NECK MASS (Right Neck)  EXCISION LEFT FOREARM MASS (Left )    Medications prior to admission:   Prior to Admission medications    Medication Sig Start Date End Date Taking? Authorizing Provider   spironolactone (ALDACTONE) 25 MG tablet Take 0.5 tablets by mouth daily 19   Daquan Lopes MD   Riociguat (ADEMPAS) 2.5 MG TABS Take 2.5 mg by mouth 3 times daily 19   Emerita Elizondo MD   lisinopril (PRINIVIL;ZESTRIL) 2.5 MG tablet TAKE 1 TABLET BY MOUTH EVERY DAY 19   Daquan Lopes MD   furosemide (LASIX) 20 MG tablet TAKE 1 TABLET BY MOUTH EVERY DAY OR AS DIRECTED BY MD 19   Emerita Elizondo MD   OXYGEN Inhale 2 L into the lungs continuous    Historical Provider, MD   amLODIPine (NORVASC) 2.5 MG tablet Take 1 tablet by mouth nightly 18   Blanca Martinez MD   warfarin (COUMADIN) 5 MG tablet Hold for  INR greater  Than 3.0  Patient taking differently: Hold for  INR greater  Than 3.0  Managed by [facility] 18   Blanca Martinez MD   omeprazole (PRILOSEC) 20 MG delayed release capsule Take 20 mg by mouth daily    Historical Provider, MD   levothyroxine (SYNTHROID) 75 MCG tablet TAKE 1 TABLET BY MOUTH EVERY DAY 10/25/18   Daquan Lopes MD   docusate sodium (COLACE) 100 MG capsule Take 1 capsule by mouth daily as needed for Constipation 17   Evelina Moreno MD   Multiple Vitamins-Minerals (THERAPEUTIC MULTIVITAMIN-MINERALS) tablet Take 1 tablet by mouth daily    Historical Provider, MD   Ascorbic Acid (VITAMIN C) 500 MG tablet Take 500 mg by mouth daily    Historical Provider, MD       Current medications:    No current facility-administered medications for this encounter. 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 [address] 12/2014    Scoliosis     Scoliosis     Thyroid disease     hypothyroid       Past Surgical History:        Procedure Laterality Date    COLONOSCOPY  7/08    JOINT REPLACEMENT      both knees       Social History:    Social History     Tobacco Use    Smoking status: Former Smoker     Packs/day: 0.25     Years: 15.00     Pack years: 3.75     Types: Cigarettes    Smokeless tobacco: Former User     Quit date: 12/27/1965   Substance Use Topics    Alcohol use:  No                                Counseling given: Not Answered      Vital Signs (Current):   Vitals:    09/16/19 1733   Weight: 150 lb (68 kg)   Height: 5' 5\" (1.651 m)                                              BP Readings from Last 3 Encounters:   08/22/19 103/60   07/15/19 116/66   06/10/19 118/72       NPO Status:                                                                                 BMI:   Wt Readings from Last 3

## 2019-09-30 ENCOUNTER — ANTI-COAG VISIT (OUTPATIENT)
Dept: PHARMACY | Age: 84
End: 2019-09-30
Payer: MEDICARE

## 2019-09-30 DIAGNOSIS — Z86.711 HISTORY OF PULMONARY EMBOLISM: ICD-10-CM

## 2019-09-30 LAB — INTERNATIONAL NORMALIZATION RATIO, POC: 1.8

## 2019-09-30 PROCEDURE — 85610 PROTHROMBIN TIME: CPT

## 2019-09-30 PROCEDURE — 99211 OFF/OP EST MAY X REQ PHY/QHP: CPT

## 2019-10-03 ENCOUNTER — OFFICE VISIT (OUTPATIENT)
Dept: SURGERY | Age: 84
End: 2019-10-03

## 2019-10-03 VITALS — DIASTOLIC BLOOD PRESSURE: 58 MMHG | SYSTOLIC BLOOD PRESSURE: 110 MMHG

## 2019-10-03 DIAGNOSIS — Z09 POSTOP CHECK: Primary | ICD-10-CM

## 2019-10-03 PROCEDURE — 99024 POSTOP FOLLOW-UP VISIT: CPT | Performed by: SURGERY

## 2019-10-18 DIAGNOSIS — E03.9 HYPOTHYROIDISM, UNSPECIFIED TYPE: Primary | ICD-10-CM

## 2019-10-21 ENCOUNTER — HOSPITAL ENCOUNTER (OUTPATIENT)
Age: 84
Discharge: HOME | End: 2019-10-21
Payer: MEDICARE

## 2019-10-21 ENCOUNTER — ANTI-COAG VISIT (OUTPATIENT)
Dept: PHARMACY | Age: 84
End: 2019-10-21
Payer: MEDICARE

## 2019-10-21 ENCOUNTER — OFFICE VISIT (OUTPATIENT)
Dept: CARDIOLOGY CLINIC | Age: 84
End: 2019-10-21
Payer: MEDICARE

## 2019-10-21 VITALS
DIASTOLIC BLOOD PRESSURE: 50 MMHG | RESPIRATION RATE: 16 BRPM | HEART RATE: 85 BPM | HEIGHT: 60 IN | SYSTOLIC BLOOD PRESSURE: 108 MMHG | OXYGEN SATURATION: 93 % | BODY MASS INDEX: 29.29 KG/M2

## 2019-10-21 DIAGNOSIS — I27.21 PAH (PULMONARY ARTERY HYPERTENSION) (HCC): ICD-10-CM

## 2019-10-21 DIAGNOSIS — E03.9 HYPOTHYROIDISM, UNSPECIFIED TYPE: ICD-10-CM

## 2019-10-21 DIAGNOSIS — I10 ESSENTIAL HYPERTENSION: ICD-10-CM

## 2019-10-21 DIAGNOSIS — I26.99 OTHER PULMONARY EMBOLISM WITHOUT ACUTE COR PULMONALE, UNSPECIFIED CHRONICITY (HCC): ICD-10-CM

## 2019-10-21 DIAGNOSIS — Z86.711 HISTORY OF PULMONARY EMBOLISM: ICD-10-CM

## 2019-10-21 DIAGNOSIS — I27.24 CTEPH (CHRONIC THROMBOEMBOLIC PULMONARY HYPERTENSION) (HCC): ICD-10-CM

## 2019-10-21 DIAGNOSIS — I27.24 CTEPH (CHRONIC THROMBOEMBOLIC PULMONARY HYPERTENSION) (HCC): Primary | ICD-10-CM

## 2019-10-21 LAB
A/G RATIO: 1.8 (ref 1.1–2.2)
ALBUMIN SERPL-MCNC: 4.6 G/DL (ref 3.4–5)
ALP BLD-CCNC: 75 U/L (ref 40–129)
ALT SERPL-CCNC: 17 U/L (ref 10–40)
ANION GAP SERPL CALCULATED.3IONS-SCNC: 15 MMOL/L (ref 3–16)
AST SERPL-CCNC: 23 U/L (ref 15–37)
BILIRUB SERPL-MCNC: 0.3 MG/DL (ref 0–1)
BUN BLDV-MCNC: 18 MG/DL (ref 7–20)
CALCIUM SERPL-MCNC: 10.1 MG/DL (ref 8.3–10.6)
CHLORIDE BLD-SCNC: 103 MMOL/L (ref 99–110)
CO2: 27 MMOL/L (ref 21–32)
CREAT SERPL-MCNC: 1.1 MG/DL (ref 0.6–1.2)
GFR AFRICAN AMERICAN: 57
GFR NON-AFRICAN AMERICAN: 47
GLOBULIN: 2.6 G/DL
GLUCOSE BLD-MCNC: 98 MG/DL (ref 70–99)
INTERNATIONAL NORMALIZATION RATIO, POC: 2.1
POTASSIUM SERPL-SCNC: 4.7 MMOL/L (ref 3.5–5.1)
PRO-BNP: 91 PG/ML (ref 0–449)
SODIUM BLD-SCNC: 145 MMOL/L (ref 136–145)
TOTAL PROTEIN: 7.2 G/DL (ref 6.4–8.2)
TSH REFLEX FT4: 0.79 UIU/ML (ref 0.27–4.2)

## 2019-10-21 PROCEDURE — 99211 OFF/OP EST MAY X REQ PHY/QHP: CPT

## 2019-10-21 PROCEDURE — 80053 COMPREHEN METABOLIC PANEL: CPT

## 2019-10-21 PROCEDURE — 99214 OFFICE O/P EST MOD 30 MIN: CPT | Performed by: CLINICAL NURSE SPECIALIST

## 2019-10-21 PROCEDURE — 83880 ASSAY OF NATRIURETIC PEPTIDE: CPT

## 2019-10-21 PROCEDURE — 85610 PROTHROMBIN TIME: CPT

## 2019-10-21 PROCEDURE — 84443 ASSAY THYROID STIM HORMONE: CPT

## 2019-10-21 PROCEDURE — 36415 COLL VENOUS BLD VENIPUNCTURE: CPT

## 2019-10-21 RX ORDER — AMLODIPINE BESYLATE 2.5 MG/1
2.5 TABLET ORAL NIGHTLY
Qty: 30 TABLET | Refills: 3 | Status: SHIPPED | OUTPATIENT
Start: 2019-10-21 | End: 2020-02-24 | Stop reason: ALTCHOICE

## 2019-10-22 ENCOUNTER — TELEPHONE (OUTPATIENT)
Dept: CARDIOLOGY CLINIC | Age: 84
End: 2019-10-22

## 2019-10-23 DIAGNOSIS — M41.25 OTHER IDIOPATHIC SCOLIOSIS, THORACOLUMBAR REGION: ICD-10-CM

## 2019-10-23 RX ORDER — TRAMADOL HYDROCHLORIDE 50 MG/1
50 TABLET ORAL EVERY 8 HOURS PRN
Qty: 90 TABLET | Refills: 2 | Status: SHIPPED | OUTPATIENT
Start: 2019-10-23 | End: 2020-02-03

## 2019-11-04 RX ORDER — LEVOTHYROXINE SODIUM 0.07 MG/1
TABLET ORAL
Qty: 90 TABLET | Refills: 1 | Status: SHIPPED | OUTPATIENT
Start: 2019-11-04 | End: 2020-04-30

## 2019-11-07 ENCOUNTER — OFFICE VISIT (OUTPATIENT)
Dept: SURGERY | Age: 84
End: 2019-11-07
Payer: MEDICARE

## 2019-11-07 VITALS — SYSTOLIC BLOOD PRESSURE: 117 MMHG | DIASTOLIC BLOOD PRESSURE: 61 MMHG

## 2019-11-07 DIAGNOSIS — R22.2 MASS OF SKIN OF BACK: Primary | ICD-10-CM

## 2019-11-07 PROCEDURE — 11104 PUNCH BX SKIN SINGLE LESION: CPT | Performed by: SURGERY

## 2019-11-07 PROCEDURE — 11105 PUNCH BX SKIN EA SEP/ADDL: CPT | Performed by: SURGERY

## 2019-11-18 ENCOUNTER — ANTI-COAG VISIT (OUTPATIENT)
Dept: PHARMACY | Age: 84
End: 2019-11-18
Payer: MEDICARE

## 2019-11-18 DIAGNOSIS — Z86.711 HISTORY OF PULMONARY EMBOLISM: ICD-10-CM

## 2019-11-18 LAB — INTERNATIONAL NORMALIZATION RATIO, POC: 1.6

## 2019-11-18 PROCEDURE — 99211 OFF/OP EST MAY X REQ PHY/QHP: CPT

## 2019-11-18 PROCEDURE — 85610 PROTHROMBIN TIME: CPT

## 2019-12-03 ENCOUNTER — ANTI-COAG VISIT (OUTPATIENT)
Dept: PHARMACY | Age: 84
End: 2019-12-03
Payer: MEDICARE

## 2019-12-03 DIAGNOSIS — Z86.711 HISTORY OF PULMONARY EMBOLISM: ICD-10-CM

## 2019-12-03 LAB — INTERNATIONAL NORMALIZATION RATIO, POC: 1.7

## 2019-12-03 PROCEDURE — 99211 OFF/OP EST MAY X REQ PHY/QHP: CPT

## 2019-12-03 PROCEDURE — 85610 PROTHROMBIN TIME: CPT

## 2019-12-17 ENCOUNTER — ANTI-COAG VISIT (OUTPATIENT)
Dept: PHARMACY | Age: 84
End: 2019-12-17
Payer: MEDICARE

## 2019-12-17 DIAGNOSIS — Z86.711 HISTORY OF PULMONARY EMBOLISM: ICD-10-CM

## 2019-12-17 LAB — INTERNATIONAL NORMALIZATION RATIO, POC: 2.1

## 2019-12-17 PROCEDURE — 85610 PROTHROMBIN TIME: CPT

## 2019-12-17 PROCEDURE — 99212 OFFICE O/P EST SF 10 MIN: CPT

## 2020-01-09 ENCOUNTER — PROCEDURE VISIT (OUTPATIENT)
Dept: SURGERY | Age: 85
End: 2020-01-09
Payer: MEDICARE

## 2020-01-09 VITALS — SYSTOLIC BLOOD PRESSURE: 110 MMHG | DIASTOLIC BLOOD PRESSURE: 72 MMHG

## 2020-01-09 PROCEDURE — 11603 EXC TR-EXT MAL+MARG 2.1-3 CM: CPT | Performed by: SURGERY

## 2020-01-09 PROCEDURE — 12032 INTMD RPR S/A/T/EXT 2.6-7.5: CPT | Performed by: SURGERY

## 2020-01-09 NOTE — PROGRESS NOTES
[facility] GENERAL AND LAPAROSCOPIC SURGERY          PATIENT NAME: Logan Zhang     TODAY'S DATE: 1/9/2020    SUBJECTIVE:    Pt here for back excision, basal cell cancer of the back.      OBJECTIVE:  VITALS:  /72     Procedure Note:  Mid back area skin cancer  Site confirmed, pt consents      Betadine Prep  1% Lido with epi X 8 ml local  #15 blade excision done  3 cm size excision  3 cm layered closure; 3-0 Vicryl,for superficial fascia, and subdermal layers, 4-0 Monocryl for skin  Dermabond placed  Pt tolerated well  Site hemostatic  DSD placed  Dressing care instructions reviewed with pt    Virgen Aponte

## 2020-01-16 ENCOUNTER — ANTI-COAG VISIT (OUTPATIENT)
Dept: PHARMACY | Age: 85
End: 2020-01-16
Payer: MEDICARE

## 2020-01-16 LAB — INTERNATIONAL NORMALIZATION RATIO, POC: 1.8

## 2020-01-16 PROCEDURE — 99211 OFF/OP EST MAY X REQ PHY/QHP: CPT

## 2020-01-16 PROCEDURE — 85610 PROTHROMBIN TIME: CPT

## 2020-01-16 RX ORDER — LISINOPRIL 2.5 MG/1
TABLET ORAL
Qty: 90 TABLET | Refills: 1 | Status: SHIPPED | OUTPATIENT
Start: 2020-01-16 | End: 2020-07-24

## 2020-01-16 NOTE — PROGRESS NOTES
Ms. Mary Gutiérrez is a 80 y.o. 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 Took Xarelto for the 3 years after her first PE. Then developed \"several small clots\" as it was described. She was then switched to warfarin 5mg as of 12/3/18. Pertinent PMH: HTN, Hx of PE,     Patient Reported Findings:  Yes     No  [x]   []       Patient & son verifies current dosing regimen as listed   []   [x]       S/S bleeding/bruising/swelling/SOB  []   [x]       Blood in urine or stool  [x]   []       Procedures scheduled in the future at this time  Had lump removed on back on 1/9, held for 5 days, no lovenox    []   [x]       Missed Dose   []   [x]       Extra Dose  []   [x]       Change in medications    []   [x]       Change in health/diet/appetite She states that she has been eating her normal vegetables. She can not tolerate salads. --> no changes   []   [x]       Change in alcohol use  []   [x]       Change in activity  []   [x]       Hospital admission  []   [x]       Emergency department visit  []   [x]       Other complaints     Clinical Outcomes:  Yes     No  []   [x]       Major bleeding event  []   [x]       Thromboembolic event    Duration of warfarin Therapy: indefinitely  INR Range:  2.0-3.0    She presents with son today. INR is 1.8 today after recently restarting warfarin after procedure   Since prior to surgery INR was on low end of therapeutic or slightly subtherapeutic, take 10 mg tonight then increase weekly dose to 7.5 mg daily (5% inc)  Encouraged to maintain a consistency of vegetables/salads.   Recheck INR in 2 weeks 2/3    Referring is Dr. Cindy Isabel   INR (no units)   Date Value   01/16/2020 1.8   12/17/2019 2.1   12/03/2019 1.7   11/18/2019 1.6   01/22/2019 1.68 (H)   12/05/2018 1.11   12/04/2018 1.23 (H)   12/03/2018 1.81 (H)

## 2020-02-03 ENCOUNTER — ANTI-COAG VISIT (OUTPATIENT)
Dept: PHARMACY | Age: 85
End: 2020-02-03
Payer: MEDICARE

## 2020-02-03 LAB — INTERNATIONAL NORMALIZATION RATIO, POC: 2.1

## 2020-02-03 PROCEDURE — 99211 OFF/OP EST MAY X REQ PHY/QHP: CPT

## 2020-02-03 PROCEDURE — 85610 PROTHROMBIN TIME: CPT

## 2020-02-03 RX ORDER — TRAMADOL HYDROCHLORIDE 50 MG/1
50 TABLET ORAL EVERY 8 HOURS PRN
Qty: 90 TABLET | Refills: 2 | Status: SHIPPED | OUTPATIENT
Start: 2020-02-03 | End: 2020-03-04

## 2020-02-03 NOTE — PROGRESS NOTES
Ms. Elena Rodas is a 80 y.o. 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 Took Xarelto for the 3 years after her first PE. Then developed \"several small clots\" as it was described. She was then switched to warfarin 5mg as of 12/3/18. Pertinent PMH: HTN, Hx of PE,     Patient Reported Findings:  Yes     No  [x]   []       Patient & son verifies current dosing regimen as listed   []   [x]       S/S bleeding/bruising/swelling/SOB  []   [x]       Blood in urine or stool  []   [x]       Procedures scheduled in the future at this time   []   [x]       Missed Dose   []   [x]       Extra Dose  []   [x]       Change in medications    []   [x]       Change in health/diet/appetite She states that she has been eating her normal vegetables. She can not tolerate salads. --> no changes   []   [x]       Change in alcohol use  []   [x]       Change in activity  []   [x]       Hospital admission  []   [x]       Emergency department visit  []   [x]       Other complaints     Clinical Outcomes:  Yes     No  []   [x]       Major bleeding event  []   [x]       Thromboembolic event    Duration of warfarin Therapy: indefinitely  INR Range:  2.0-3.0    She presents with son today. INR is 2.1 today   Continue weekly dose of 7.5 mg daily  Encouraged to maintain a consistency of vegetables/salads.   Recheck INR in 2 weeks 2/24    Referring is Dr. Ten Calderon   INR (no units)   Date Value   01/16/2020 1.8   12/17/2019 2.1   12/03/2019 1.7   11/18/2019 1.6   01/22/2019 1.68 (H)   12/05/2018 1.11   12/04/2018 1.23 (H)   12/03/2018 1.81 (H)

## 2020-02-24 ENCOUNTER — ANTI-COAG VISIT (OUTPATIENT)
Dept: PHARMACY | Age: 85
End: 2020-02-24
Payer: MEDICARE

## 2020-02-24 LAB — INTERNATIONAL NORMALIZATION RATIO, POC: 2.5

## 2020-02-24 PROCEDURE — 85610 PROTHROMBIN TIME: CPT

## 2020-02-24 PROCEDURE — 99211 OFF/OP EST MAY X REQ PHY/QHP: CPT

## 2020-03-20 ENCOUNTER — TELEPHONE (OUTPATIENT)
Dept: CARDIOLOGY CLINIC | Age: 85
End: 2020-03-20

## 2020-03-20 NOTE — TELEPHONE ENCOUNTER
Left message for son and daughter to change appt time to 12:00 on 3-27 due to 30 min time slots needed.

## 2020-03-23 ENCOUNTER — TELEPHONE (OUTPATIENT)
Dept: PHARMACY | Age: 85
End: 2020-03-23

## 2020-03-24 ENCOUNTER — TELEPHONE (OUTPATIENT)
Dept: CARDIOLOGY CLINIC | Age: 85
End: 2020-03-24

## 2020-03-25 RX ORDER — SPIRONOLACTONE 25 MG/1
TABLET ORAL
Qty: 90 TABLET | Refills: 0 | Status: SHIPPED | OUTPATIENT
Start: 2020-03-25 | End: 2020-09-14

## 2020-03-26 ENCOUNTER — TELEPHONE (OUTPATIENT)
Dept: PHARMACY | Age: 85
End: 2020-03-26

## 2020-03-26 ENCOUNTER — TELEPHONE (OUTPATIENT)
Dept: CARDIOLOGY CLINIC | Age: 85
End: 2020-03-26

## 2020-03-26 NOTE — TELEPHONE ENCOUNTER
This patient has an appt with RORY tomorrow . Sekou from the coumadin clinic is asking if her INR could be checked here in the office at her appt ? Sybil Benavides says they (the coumadin clinic) will dose her , but they are closed and doesn't want the patient to have to go to a different lab.

## 2020-03-26 NOTE — TELEPHONE ENCOUNTER
Dr. Landen Tapia office will order with labs tomorrow. We will put in the order for them but they don't need it faxed.     Myles Ivey, PharmD, [facility]

## 2020-03-26 NOTE — TELEPHONE ENCOUNTER
Our coumadin clinic is closed due to [address]. Spoke to [employer].  She will place order for INR and dose pt. Pt needs to get lab draw when she is here for her appt. Asked if pt is needed other labs from [place] [address].   Will discuss at appt. tomorrow

## 2020-03-26 NOTE — TELEPHONE ENCOUNTER
Called patient. Pt refused to go to one of the labs dt distance. Will be seeing Dr. Aydee Price tomorrow at noon and wants to see if she would do an INR then. Will contact the office and inquire---> reached out to office and they will ask if they can get an INR or add it on to labs and call me back. Explained that we aren't seeing patients in the clinic until COVID-19 has passed then we will re-open. We are still asking patients to get their INRs checked by getting a lab draw at a designated lab ([facility]). We are asking that you get your lab drawn on the day of your appointment (time doesn't matter as long as you go during hours of operation), so please go sometime tomorrow to the lab to get your INR drawn. Then we will be contacting you via telephone with results and we will conduct the whole appointment over the telephone. Patient would prefer Dr. Aydee Price do a lab draw tomorrow if possible.     Jen Little, PharmD, [facility]

## 2020-03-26 NOTE — PROGRESS NOTES
submitting the paperwork. Discussed if Adempas is cost prohibited, we will switch her to Sildenafil. WHO group 4. Class 3. Allergies   Allergen Reactions    Naprosyn [Naproxen] Nausea Only     ABDOMINAL PAIN     Current Outpatient Medications   Medication Sig Dispense Refill    traMADol (ULTRAM) 50 MG tablet Take 50 mg by mouth every 6 hours as needed for Pain. Indications: Pain      spironolactone (ALDACTONE) 25 MG tablet TAKE 1/2 TABLET BY MOUTH EVERY DAY 90 tablet 0    lisinopril (PRINIVIL;ZESTRIL) 2.5 MG tablet TAKE 1 TABLET BY MOUTH EVERY DAY 90 tablet 1    levothyroxine (SYNTHROID) 75 MCG tablet TAKE 1 TABLET BY MOUTH EVERY DAY 90 tablet 1    Riociguat (ADEMPAS) 2.5 MG TABS Take 2.5 mg by mouth 3 times daily 90 tablet 5    furosemide (LASIX) 20 MG tablet TAKE 1 TABLET BY MOUTH EVERY DAY OR AS DIRECTED BY MD 30 tablet 11    OXYGEN Inhale 2 L into the lungs continuous      warfarin (COUMADIN) 5 MG tablet Hold for  INR greater  Than 3.0 (Patient taking differently: Hold for  INR greater  Than 3.0  Managed by [facility]) 15 tablet 0    omeprazole (PRILOSEC) 20 MG delayed release capsule Take 20 mg by mouth daily      docusate sodium (COLACE) 100 MG capsule Take 1 capsule by mouth daily as needed for Constipation 30 capsule 2    Multiple Vitamins-Minerals (THERAPEUTIC MULTIVITAMIN-MINERALS) tablet Take 1 tablet by mouth daily      Ascorbic Acid (VITAMIN C) 500 MG tablet Take 500 mg by mouth daily       No current facility-administered medications for this visit.         Past Medical History:   Diagnosis Date    Arthritis     Hyperlipidemia     Hypertension     Hypothyroidism     Lumbar herniated disc     Movement disorder     scoliosis    PAH (pulmonary arterial hypertension) with portal hypertension (HCC)     Pulmonary emboli ([facility] [address] 12/2014    Scoliosis     Scoliosis     Thyroid disease     hypothyroid     Past Surgical History:   Procedure Laterality Date    engorged  · The carotid upstroke is normal in amplitude and contour without delay or bruit  · JVP less than 8 cm H2O  RRR with nl S1 and S2 without m,r,g  · Peripheral pulses are symmetrical and full  · There is no clubbing, cyanosis of the extremities. · No edema  · Femoral Arteries: 2+ and equal  · Pedal Pulses: 2+ and equal   Neck:  · No thyromegaly  Abdomen:  · No masses or tenderness  · Liver/Spleen: No Abnormalities Noted  Neurological/Psychiatric:  · Alert and oriented in all spheres  · Moves all extremities well  · Exhibits normal gait balance and coordination  · No abnormalities of mood, affect, memory, mentation, or behavior are noted      Recent hospitalization and testin-20-19 at 1:45pm.   6 minute Walk. Completed 4 laps. 73.2 meters walked/240 feet. Walked 4 minutes  [place]  118/78  74  Pulse ox  92% on 2 liters NC    Immediate post walk   142/80  116  91% on 2 liters NC    5 minutes post walk  96/76  105  93% on 2 liters NC      19 Echo  Summary   -Normal left ventricle size and wall thickness. Hyperdynamic systolic   function with an estimated ejection fraction of 65-70%. No regional wall   motion abnormalities are seen. E/e\"= 9.4 .   -Grade I diastolic dysfunction with normal LV filling pressures.   -Moderate aortic stenosis with a peak gradient of 37 mmHg and a mean   pressure gradient of 19mmHg.   -Mild mitral regurgitation.   -Mild to moderate tricuspid regurgitation.   -The right atrium is mildly dilated.   -Estimated pulmonary artery systolic pressure is severely elevated at 77   mmHg assuming a right atrial pressure of 3 mmHg. Carotid duplex :  YINKA < 57%, LICA - no significant stenosis    VQ scan 18: high probability for PE     Echo 18   -Normal left ventricle size, wall thickness, and systolic function with EF 70% 3.02m/s and a mean gradient 25 mmHg. The aortic valve area - 1.08 cm^2.  No significant regurgitation noted.   -Mild-to-moderate tricuspid quickly. Will start her on oral iron until coronavirus epidemic is over, then might benefit from IV iron infusions. Follow CBC. Consider holding warfarin for a month or so. Scribe's attestation: This note was scribed in the presence of Adebayo Buck M.D. by Shiraz Dickinson RN     The scribe's documentation has been prepared under my direction and personally reviewed by me in its entirety. I confirm that the note above accurately reflects all work, treatment, procedures, and medical decision making performed by me. Spent 50 minutes with patient reviewing chart, examining patient, and developing plan of care. QUALITY MEASURES  1. Tobacco Cessation Counseling:  NA  2. Retake of BP if >140/90:   NA  3. Documentation to PCP/referring for new patient:  Sent to PCP at close of office visit  4. CAD patient on anti-platelet: No  5. CAD patient on STATIN therapy:  No  6. Patient with CHF and aFib on anticoagulation:  Yes   Anticoagulation for hx of PE      I appreciate the opportunity of cooperating in the care of this patient.     Adebayo Buck M.D., [facility]

## 2020-03-27 ENCOUNTER — HOSPITAL ENCOUNTER (OUTPATIENT)
Age: 85
Discharge: HOME | End: 2020-03-27
Payer: MEDICARE

## 2020-03-27 ENCOUNTER — OFFICE VISIT (OUTPATIENT)
Dept: CARDIOLOGY CLINIC | Age: 85
End: 2020-03-27
Payer: MEDICARE

## 2020-03-27 ENCOUNTER — TELEPHONE (OUTPATIENT)
Dept: CARDIOLOGY CLINIC | Age: 85
End: 2020-03-27

## 2020-03-27 ENCOUNTER — SCHEDULED TELEPHONE ENCOUNTER (OUTPATIENT)
Dept: PHARMACY | Age: 85
End: 2020-03-27
Payer: MEDICARE

## 2020-03-27 VITALS
OXYGEN SATURATION: 96 % | BODY MASS INDEX: 29.29 KG/M2 | DIASTOLIC BLOOD PRESSURE: 40 MMHG | HEART RATE: 99 BPM | HEIGHT: 60 IN | SYSTOLIC BLOOD PRESSURE: 92 MMHG

## 2020-03-27 LAB
A/G RATIO: 1.3 (ref 1.1–2.2)
ALBUMIN SERPL-MCNC: 3.9 G/DL (ref 3.4–5)
ALP BLD-CCNC: 64 U/L (ref 40–129)
ALT SERPL-CCNC: 13 U/L (ref 10–40)
ANION GAP SERPL CALCULATED.3IONS-SCNC: 11 MMOL/L (ref 3–16)
AST SERPL-CCNC: 17 U/L (ref 15–37)
BILIRUB SERPL-MCNC: <0.2 MG/DL (ref 0–1)
BUN BLDV-MCNC: 22 MG/DL (ref 7–20)
CALCIUM SERPL-MCNC: 9.6 MG/DL (ref 8.3–10.6)
CHLORIDE BLD-SCNC: 101 MMOL/L (ref 99–110)
CO2: 28 MMOL/L (ref 21–32)
CREAT SERPL-MCNC: 0.9 MG/DL (ref 0.6–1.2)
GFR AFRICAN AMERICAN: >60
GFR NON-AFRICAN AMERICAN: 59
GLOBULIN: 3 G/DL
GLUCOSE BLD-MCNC: 95 MG/DL (ref 70–99)
HCT VFR BLD CALC: 30 % (ref 36–48)
HEMATOLOGY PATH CONSULT: NO
HEMOGLOBIN: 8.9 G/DL (ref 12–16)
INR BLD: 1.99 (ref 0.86–1.14)
MCH RBC QN AUTO: 20.1 PG (ref 26–34)
MCHC RBC AUTO-ENTMCNC: 29.8 G/DL (ref 31–36)
MCV RBC AUTO: 67.6 FL (ref 80–100)
PDW BLD-RTO: 18.9 % (ref 12.4–15.4)
PLATELET # BLD: 467 K/UL (ref 135–450)
PMV BLD AUTO: 8.9 FL (ref 5–10.5)
POTASSIUM SERPL-SCNC: 4.3 MMOL/L (ref 3.5–5.1)
PRO-BNP: 150 PG/ML (ref 0–449)
PROTHROMBIN TIME: 23.3 SEC (ref 10–13.2)
RBC # BLD: 4.43 M/UL (ref 4–5.2)
SODIUM BLD-SCNC: 140 MMOL/L (ref 136–145)
TOTAL PROTEIN: 6.9 G/DL (ref 6.4–8.2)
WBC # BLD: 6.1 K/UL (ref 4–11)

## 2020-03-27 PROCEDURE — 83880 ASSAY OF NATRIURETIC PEPTIDE: CPT

## 2020-03-27 PROCEDURE — 99211 OFF/OP EST MAY X REQ PHY/QHP: CPT

## 2020-03-27 PROCEDURE — 85610 PROTHROMBIN TIME: CPT

## 2020-03-27 PROCEDURE — 36415 COLL VENOUS BLD VENIPUNCTURE: CPT

## 2020-03-27 PROCEDURE — 99215 OFFICE O/P EST HI 40 MIN: CPT | Performed by: INTERNAL MEDICINE

## 2020-03-27 PROCEDURE — 80053 COMPREHEN METABOLIC PANEL: CPT

## 2020-03-27 PROCEDURE — 85027 COMPLETE CBC AUTOMATED: CPT

## 2020-03-27 RX ORDER — TRAMADOL HYDROCHLORIDE 50 MG/1
50 TABLET ORAL EVERY 6 HOURS PRN
COMMUNITY
End: 2020-06-16

## 2020-03-27 NOTE — TELEPHONE ENCOUNTER
Called and left message for pepito Palma regarding CBC and drop in H/H. Requesting a CBC in one week to recheck numbers. Explained that it likely has been declining over time. Also explained that RORY added an iron study test to blood in the lab. Left office number for any questions and told that RORY sent a MyChart message.

## 2020-03-27 NOTE — PATIENT INSTRUCTIONS
Plan:  1. Labs today including PT-INR. 2.  Echo in 6 months with next office visit. 3.  Continue same medications.

## 2020-03-27 NOTE — TELEPHONE ENCOUNTER
Ms. Mitch Alvarez is a 80 y.o. 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 Took Xarelto for the 3 years after her first PE. Then developed \"several small clots\" as it was described. She was then switched to warfarin 5mg as of 12/3/18. Pertinent PMH: HTN, Hx of PE,     Patient Reported Findings:  Yes     No  [x]   []       Patient & son verifies current dosing regimen as listed home nurse that takes care of medications, verified that patient has been taking 7.5 mg daily and did not miss doses, per paperwork with written notes   []   [x]       S/S bleeding/bruising/swelling/SOB  []   [x]       Blood in urine or stool  []   [x]       Procedures scheduled in the future at this time   []   [x]       Missed Dose- not sure    []   [x]       Extra Dose  [x]   []       Change in medications not taking amlodipine  ---> daughter states no changes   []   [x]       Change in health/diet/appetite She states that she has been eating her normal vegetables. She can not tolerate salads. --> no changes   []   [x]       Change in alcohol use  []   [x]       Change in activity  []   [x]       Hospital admission  []   [x]       Emergency department visit  []   [x]       Other complaints     Clinical Outcomes:  Yes     No  []   [x]       Major bleeding event  []   [x]       Thromboembolic event    Duration of warfarin Therapy: indefinitely  INR Range:  2.0-3.0     Spoke with daughter who doesn't do patient's medications, caretaker does so she was not sure about missed doses or changes but didn't think there was anything different. Since INR was therapeutic last time, will boost and continue. INR is 1.99 today via venipuncture in Dr. Skip Haley office. Take 10mg tomorrow then continue weekly dose of 7.5 mg daily. Encouraged to maintain a consistency of vegetables/salads.   Recheck INR in 4 weeks 4/23 via lab draw again- daughter selected [facility] location

## 2020-03-28 DIAGNOSIS — R06.02 SOB (SHORTNESS OF BREATH): ICD-10-CM

## 2020-03-28 DIAGNOSIS — D50.9 IRON DEFICIENCY ANEMIA, UNSPECIFIED IRON DEFICIENCY ANEMIA TYPE: ICD-10-CM

## 2020-03-28 DIAGNOSIS — I27.24 CTEPH (CHRONIC THROMBOEMBOLIC PULMONARY HYPERTENSION) (HCC): ICD-10-CM

## 2020-03-28 DIAGNOSIS — I27.21 PAH (PULMONARY ARTERY HYPERTENSION) (HCC): ICD-10-CM

## 2020-03-28 LAB
IRON SATURATION: 5 % (ref 15–50)
IRON: 20 UG/DL (ref 37–145)
TOTAL IRON BINDING CAPACITY: 400 UG/DL (ref 260–445)

## 2020-03-30 ENCOUNTER — TELEPHONE (OUTPATIENT)
Dept: CARDIOLOGY CLINIC | Age: 85
End: 2020-03-30

## 2020-03-30 RX ORDER — FERROUS SULFATE 325(65) MG
325 TABLET ORAL 2 TIMES DAILY
Qty: 180 TABLET | Refills: 3 | Status: SHIPPED | OUTPATIENT
Start: 2020-03-30 | End: 2021-03-30 | Stop reason: SDUPTHER

## 2020-04-02 ENCOUNTER — HOSPITAL ENCOUNTER (OUTPATIENT)
Age: 85
Discharge: HOME | End: 2020-04-02
Payer: MEDICARE

## 2020-04-02 LAB
FOLATE: >20 NG/ML (ref 4.78–24.2)
HCT VFR BLD CALC: 28.8 % (ref 36–48)
HEMATOLOGY PATH CONSULT: NO
HEMOGLOBIN: 8.7 G/DL (ref 12–16)
MCH RBC QN AUTO: 20.5 PG (ref 26–34)
MCHC RBC AUTO-ENTMCNC: 30.2 G/DL (ref 31–36)
MCV RBC AUTO: 67.8 FL (ref 80–100)
PDW BLD-RTO: 19.4 % (ref 12.4–15.4)
PLATELET # BLD: 432 K/UL (ref 135–450)
PMV BLD AUTO: 8.9 FL (ref 5–10.5)
RBC # BLD: 4.25 M/UL (ref 4–5.2)
VITAMIN B-12: 949 PG/ML (ref 211–911)
WBC # BLD: 6 K/UL (ref 4–11)

## 2020-04-02 PROCEDURE — 36415 COLL VENOUS BLD VENIPUNCTURE: CPT

## 2020-04-02 PROCEDURE — 85027 COMPLETE CBC AUTOMATED: CPT

## 2020-04-02 PROCEDURE — 82607 VITAMIN B-12: CPT

## 2020-04-02 PROCEDURE — 82746 ASSAY OF FOLIC ACID SERUM: CPT

## 2020-04-03 ENCOUNTER — TELEPHONE (OUTPATIENT)
Dept: CARDIOLOGY CLINIC | Age: 85
End: 2020-04-03

## 2020-04-03 NOTE — TELEPHONE ENCOUNTER
----- Message from Kehinde Gardner MD sent at 4/2/2020  9:42 PM EDT -----  Please make sure the patient gets this message. Cherry Luu, your hematocrit is similar to the last one, so I don't think you are bleeding significantly. I would continue the iron pills for now and repeat the labs in 2 weeks. Continue to stay off the warfarin for now.     Kehinde Gardner

## 2020-04-03 NOTE — TELEPHONE ENCOUNTER
LMOM for [facility], pt's daughter.   Relayed Results and instructions as directed      Viewed by Rhina Kirk on 4/3/2020  2:24 PM

## 2020-04-13 ENCOUNTER — PATIENT MESSAGE (OUTPATIENT)
Dept: CARDIOLOGY CLINIC | Age: 85
End: 2020-04-13

## 2020-04-16 ENCOUNTER — HOSPITAL ENCOUNTER (OUTPATIENT)
Age: 85
Discharge: HOME | End: 2020-04-16
Payer: MEDICARE

## 2020-04-16 LAB
ANION GAP SERPL CALCULATED.3IONS-SCNC: 14 MMOL/L (ref 3–16)
BUN BLDV-MCNC: 14 MG/DL (ref 7–20)
CALCIUM SERPL-MCNC: 10.2 MG/DL (ref 8.3–10.6)
CHLORIDE BLD-SCNC: 100 MMOL/L (ref 99–110)
CO2: 29 MMOL/L (ref 21–32)
CREAT SERPL-MCNC: 0.9 MG/DL (ref 0.6–1.2)
GFR AFRICAN AMERICAN: >60
GFR NON-AFRICAN AMERICAN: 59
GLUCOSE BLD-MCNC: 95 MG/DL (ref 70–99)
HCT VFR BLD CALC: 39 % (ref 36–48)
HEMOGLOBIN: 11.8 G/DL (ref 12–16)
MCH RBC QN AUTO: 22.7 PG (ref 26–34)
MCHC RBC AUTO-ENTMCNC: 30.4 G/DL (ref 31–36)
MCV RBC AUTO: 74.9 FL (ref 80–100)
PDW BLD-RTO: 30.6 % (ref 12.4–15.4)
PLATELET # BLD: 368 K/UL (ref 135–450)
PMV BLD AUTO: 10.2 FL (ref 5–10.5)
POTASSIUM SERPL-SCNC: 3.8 MMOL/L (ref 3.5–5.1)
PRO-BNP: 78 PG/ML (ref 0–449)
RBC # BLD: 5.2 M/UL (ref 4–5.2)
SODIUM BLD-SCNC: 143 MMOL/L (ref 136–145)
WBC # BLD: 6.9 K/UL (ref 4–11)

## 2020-04-16 PROCEDURE — 80048 BASIC METABOLIC PNL TOTAL CA: CPT

## 2020-04-16 PROCEDURE — 83880 ASSAY OF NATRIURETIC PEPTIDE: CPT

## 2020-04-16 PROCEDURE — 36415 COLL VENOUS BLD VENIPUNCTURE: CPT

## 2020-04-16 PROCEDURE — 85027 COMPLETE CBC AUTOMATED: CPT

## 2020-04-30 ENCOUNTER — TELEPHONE (OUTPATIENT)
Dept: PHARMACY | Age: 85
End: 2020-04-30

## 2020-04-30 ENCOUNTER — HOSPITAL ENCOUNTER (OUTPATIENT)
Age: 85
Discharge: HOME | End: 2020-04-30
Payer: MEDICARE

## 2020-04-30 ENCOUNTER — ANTI-COAG VISIT (OUTPATIENT)
Dept: PHARMACY | Age: 85
End: 2020-04-30
Payer: MEDICARE

## 2020-04-30 LAB
HCT VFR BLD CALC: 41.2 % (ref 36–48)
HEMOGLOBIN: 12.8 G/DL (ref 12–16)
INR BLD: 1.03 (ref 0.86–1.14)
MCH RBC QN AUTO: 24.7 PG (ref 26–34)
MCHC RBC AUTO-ENTMCNC: 31 G/DL (ref 31–36)
MCV RBC AUTO: 79.8 FL (ref 80–100)
PDW BLD-RTO: 31.1 % (ref 12.4–15.4)
PLATELET # BLD: 311 K/UL (ref 135–450)
PMV BLD AUTO: 9.8 FL (ref 5–10.5)
PROTHROMBIN TIME: 11.9 SEC (ref 10–13.2)
RBC # BLD: 5.16 M/UL (ref 4–5.2)
WBC # BLD: 8 K/UL (ref 4–11)

## 2020-04-30 PROCEDURE — 99211 OFF/OP EST MAY X REQ PHY/QHP: CPT

## 2020-04-30 PROCEDURE — 36415 COLL VENOUS BLD VENIPUNCTURE: CPT

## 2020-04-30 PROCEDURE — 85610 PROTHROMBIN TIME: CPT

## 2020-04-30 PROCEDURE — 85027 COMPLETE CBC AUTOMATED: CPT

## 2020-04-30 RX ORDER — LEVOTHYROXINE SODIUM 0.07 MG/1
TABLET ORAL
Qty: 90 TABLET | Refills: 0 | Status: SHIPPED | OUTPATIENT
Start: 2020-04-30 | End: 2020-07-27

## 2020-04-30 NOTE — TELEPHONE ENCOUNTER
Daughter returned my call. Her brother, Chante Rivera, deals with all patient's medications. Patient is coming into [facility] today for labs and Dr. Beto Taylor ordered an INR to be done today.

## 2020-04-30 NOTE — PROGRESS NOTES
Ms. Michelle Saleem is a 80 y.o. 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 Took Xarelto for the 3 years after her first PE. Then developed \"several small clots\" as it was described. She was then switched to warfarin 5mg as of 12/3/18. Pertinent PMH: HTN, Hx of PE,     Patient Reported Findings:  Yes     No  [x]   []       Patient & son verifies current dosing regimen as listed home nurse that takes care of medications   []   [x]       S/S bleeding/bruising/swelling/SOB  []   [x]       Blood in urine or stool  []   [x]       Procedures scheduled in the future at this time   []   [x]       Missed Dose-     []   [x]       Extra Dose  []   [x]       Change in medications   []   [x]       Change in health/diet/appetite  []   [x]       Change in alcohol use  []   [x]       Change in activity  []   [x]       Hospital admission  []   [x]       Emergency department visit  [x]   []       Other complaints son states that per Dr. Khadijah Ellsworth pt will be off warfarin at least until the end of may. Asked for son to call when decided if patient is to resume warfarin or not. Clinical Outcomes:  Yes     No  []   [x]       Major bleeding event  []   [x]       Thromboembolic event    Duration of warfarin Therapy: indefinitely  INR Range:  2.0-3.0     INR is 1.03 today via venipuncture   Pt was told to hold warfarin per Dr. Khadijah Ellsworth since beginning of April. Clinic was not notified. Asked pt's son to call clinic when resume warfarin. Will schedule back in to clinic.      Referring is Dr. Khadijah Ellsworth   INR (no units)   Date Value   04/30/2020 1.03   03/27/2020 1.99 (H)   02/24/2020 2.5   02/03/2020 2.1   01/16/2020 1.8   12/17/2019 2.1   01/22/2019 1.68 (H)   12/05/2018 1.11       CLINICAL PHARMACY CONSULT: MED RECONCILIATION/REVIEW ADDENDUM    For Pharmacy Admin Tracking Only    PHSO: Yes  Total # of Interventions Recommended: 0  - Maintenance Safety Lab Monitoring

## 2020-04-30 NOTE — TELEPHONE ENCOUNTER
Medication:   Requested Prescriptions     Pending Prescriptions Disp Refills    levothyroxine (SYNTHROID) 75 MCG tablet [Pharmacy Med Name: LEVOTHYROXINE 75 MCG TABLET] 90 tablet 1     Sig: TAKE 1 TABLET BY MOUTH EVERY DAY       Last Filled:  11/4/19 #90, 1 RF     Patient Phone Number: [phone] (home)     Last appt: 6/10/19 HTN   Next appt: Visit date not found    Last Thyroid:   10/21/19 0.79

## 2020-05-01 ENCOUNTER — TELEPHONE (OUTPATIENT)
Dept: CARDIOLOGY CLINIC | Age: 85
End: 2020-05-01

## 2020-05-01 NOTE — TELEPHONE ENCOUNTER
----- Message from Osman Lobo MD sent at 5/1/2020  4:02 PM EDT -----  Please call patient's son and make sure he gets this message. Not sure who was managing her warfarin before we stopped it due to anemia, but need to restart warfarin and go back to managing it the way we were before. Aylin Medrano, your labs look good. Hemoglobin much better. Up to 12.8 which is normal.    I suggest continuing the iron pills. I think it is okay for you to restart your warfarin now. Let's recheck your CBC in 2 weeks.     Osman Lobo

## 2020-05-14 ENCOUNTER — ANTI-COAG VISIT (OUTPATIENT)
Dept: PHARMACY | Age: 85
End: 2020-05-14

## 2020-05-14 ENCOUNTER — TELEPHONE (OUTPATIENT)
Dept: CARDIOLOGY CLINIC | Age: 85
End: 2020-05-14

## 2020-05-14 ENCOUNTER — NURSE ONLY (OUTPATIENT)
Dept: CARDIOLOGY CLINIC | Age: 85
End: 2020-05-14

## 2020-05-14 ENCOUNTER — HOSPITAL ENCOUNTER (OUTPATIENT)
Age: 85
Discharge: HOME | End: 2020-05-14
Payer: MEDICARE

## 2020-05-14 VITALS — SYSTOLIC BLOOD PRESSURE: 102 MMHG | DIASTOLIC BLOOD PRESSURE: 68 MMHG

## 2020-05-14 LAB
HCT VFR BLD CALC: 44 % (ref 36–48)
HEMOGLOBIN: 14 G/DL (ref 12–16)
INR BLD: 1.4 (ref 0.86–1.14)
MCH RBC QN AUTO: 25.7 PG (ref 26–34)
MCHC RBC AUTO-ENTMCNC: 31.7 G/DL (ref 31–36)
MCV RBC AUTO: 81.1 FL (ref 80–100)
PDW BLD-RTO: 28.7 % (ref 12.4–15.4)
PLATELET # BLD: 349 K/UL (ref 135–450)
PMV BLD AUTO: 10.1 FL (ref 5–10.5)
PROTHROMBIN TIME: 16.3 SEC (ref 10–13.2)
RBC # BLD: 5.43 M/UL (ref 4–5.2)
WBC # BLD: 6.1 K/UL (ref 4–11)

## 2020-05-14 PROCEDURE — 85027 COMPLETE CBC AUTOMATED: CPT

## 2020-05-14 PROCEDURE — 36415 COLL VENOUS BLD VENIPUNCTURE: CPT

## 2020-05-14 PROCEDURE — 85610 PROTHROMBIN TIME: CPT

## 2020-05-14 PROCEDURE — 99212 OFFICE O/P EST SF 10 MIN: CPT

## 2020-05-14 NOTE — PROGRESS NOTES
Ms. Colby Rosales is a 80 y.o. 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 Took Xarelto for the 3 years after her first PE. Then developed \"several small clots\" as it was described. She was then switched to warfarin 5mg as of 12/3/18. Pertinent PMH: HTN, Hx of PE,     Patient Reported Findings:  Yes     No  [x]   []       Patient & son verifies current dosing regimen as listed home nurse that takes care of medications   Per cardio note on 5/1, was told to resume warfarin at same weekly dose of 7.5 mg daily   []   [x]       S/S bleeding/bruising/swelling/SOB  []   [x]       Blood in urine or stool  []   [x]       Procedures scheduled in the future at this time   []   [x]       Missed Dose-  denies   []   [x]       Extra Dose  [x]   []       Change in medications started taking iron and stool softener  []   [x]       Change in health/diet/appetite denies changes   []   [x]       Change in alcohol use  []   [x]       Change in activity  []   [x]       Hospital admission  []   [x]       Emergency department visit  []   [x]       Other complaints     Clinical Outcomes:  Yes     No  []   [x]       Major bleeding event  []   [x]       Thromboembolic event    Duration of warfarin Therapy: indefinitely  INR Range:  2.0-3.0     INR is 1.4 today via venipuncture   Since pt has returned to historic weekly dose for >10 days, advised for patient to increase weekly dose to 10 mg on Mon and Fri and 7.5 mg all other days of the week. (9.5% inc)  Encouraged to maintain a consistency of vegetables/salads.   Recheck INR on 5/28 when patient has other blood work to complete     Referring is Dr. Hedy Lam   INR (no units)   Date Value   05/14/2020 1.40 (H)   04/30/2020 1.03   03/27/2020 1.99 (H)   02/24/2020 2.5   02/03/2020 2.1   01/16/2020 1.8   12/17/2019 2.1   01/22/2019 1.68 (H)     CLINICAL PHARMACY CONSULT: MED RECONCILIATION/REVIEW ADDENDUM    For Pharmacy

## 2020-05-14 NOTE — PROGRESS NOTES
Denied dizziness and or lightheaded /68, will discuss with RORY      Patient was in for a BP check only today. And is asking if she should still take her lisinopril? She stated she has not been taking the norvasc.   Please advise

## 2020-05-28 ENCOUNTER — HOSPITAL ENCOUNTER (OUTPATIENT)
Age: 85
Discharge: HOME | End: 2020-05-28
Payer: MEDICARE

## 2020-05-28 ENCOUNTER — ANTI-COAG VISIT (OUTPATIENT)
Dept: PHARMACY | Age: 85
End: 2020-05-28
Payer: MEDICARE

## 2020-05-28 LAB
INR BLD: 2.82 (ref 0.86–1.14)
PROTHROMBIN TIME: 33 SEC (ref 10–13.2)

## 2020-05-28 PROCEDURE — 85610 PROTHROMBIN TIME: CPT

## 2020-05-28 PROCEDURE — 99211 OFF/OP EST MAY X REQ PHY/QHP: CPT

## 2020-05-28 PROCEDURE — 36415 COLL VENOUS BLD VENIPUNCTURE: CPT

## 2020-06-16 RX ORDER — TRAMADOL HYDROCHLORIDE 50 MG/1
TABLET ORAL
Qty: 90 TABLET | Refills: 0 | Status: SHIPPED | OUTPATIENT
Start: 2020-06-16 | End: 2020-07-30

## 2020-06-18 ENCOUNTER — ANTI-COAG VISIT (OUTPATIENT)
Dept: PHARMACY | Age: 85
End: 2020-06-18
Payer: MEDICARE

## 2020-06-18 VITALS — TEMPERATURE: 97.9 F

## 2020-06-18 LAB — INTERNATIONAL NORMALIZATION RATIO, POC: 2.9

## 2020-06-18 PROCEDURE — 85610 PROTHROMBIN TIME: CPT

## 2020-06-18 PROCEDURE — 99211 OFF/OP EST MAY X REQ PHY/QHP: CPT

## 2020-06-23 ENCOUNTER — TELEPHONE (OUTPATIENT)
Dept: CARDIOLOGY CLINIC | Age: 85
End: 2020-06-23

## 2020-06-23 ENCOUNTER — PATIENT MESSAGE (OUTPATIENT)
Dept: CARDIOLOGY CLINIC | Age: 85
End: 2020-06-23

## 2020-06-23 RX ORDER — RIOCIGUAT 2.5 MG/1
2.5 TABLET, FILM COATED ORAL 3 TIMES DAILY
Qty: 90 TABLET | Refills: 11 | Status: SHIPPED | OUTPATIENT
Start: 2020-06-23 | End: 2021-07-07 | Stop reason: SDUPTHER

## 2020-06-23 RX ORDER — RIOCIGUAT 2.5 MG/1
2.5 TABLET, FILM COATED ORAL 3 TIMES DAILY
Qty: 90 TABLET | Refills: 5 | Status: CANCELLED | OUTPATIENT
Start: 2020-06-23

## 2020-06-23 NOTE — TELEPHONE ENCOUNTER
Script for Adempas 2.5 mg 1 tablet tid #90, 11 RF sent (per RORY request) to [facility].

## 2020-07-16 ENCOUNTER — ANTI-COAG VISIT (OUTPATIENT)
Dept: PHARMACY | Age: 85
End: 2020-07-16
Payer: MEDICARE

## 2020-07-16 VITALS — TEMPERATURE: 98.1 F

## 2020-07-16 LAB — INTERNATIONAL NORMALIZATION RATIO, POC: 3.3

## 2020-07-16 PROCEDURE — 85610 PROTHROMBIN TIME: CPT

## 2020-07-16 PROCEDURE — 99212 OFFICE O/P EST SF 10 MIN: CPT

## 2020-07-16 NOTE — PROGRESS NOTES
Ms. Crystal Penn is a 80 y.o. 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 Took Xarelto for the 3 years after her first PE. Then developed \"several small clots\" as it was described. She was then switched to warfarin 5mg as of 12/3/18. Pertinent PMH: HTN, Hx of PE,     Patient Reported Findings:  Yes     No  [x]   []       Patient & son verifies current dosing regimen as listed home nurse that takes care of medications   Per cardio note on 5/1, was told to resume warfarin at same weekly dose of 7.5 mg daily ---> son confirmed dose ---> Daughter accompanied patient today- doesn't do medications, not sure of dosing since has caretaker. She will check when she gets home to confirm dose. --> pt confirms dose   []   [x]       S/S bleeding/bruising/swelling/SOB- denies   []   [x]       Blood in urine or stool- denies   []   [x]       Procedures scheduled in the future at this time   []   [x]       Missed Dose-  denies   []   [x]       Extra Dose- denies   [x]   []       Change in medications started taking iron and stool softener---> no changes   []   [x]       Change in health/diet/appetite denies changes   []   [x]       Change in alcohol use  []   [x]       Change in activity  []   [x]       Hospital admission  []   [x]       Emergency department visit  []   [x]       Other complaints     Clinical Outcomes:  Yes     No  []   [x]       Major bleeding event  []   [x]       Thromboembolic event    Duration of warfarin Therapy: indefinitely  INR Range:  2.0-3.0     Daughter accompanied patient today- doesn't do medications, not sure of dosing since has caretaker. She will check when she gets home to confirm dose. INR is 3.3 today  Decrease weekly dose to 10mg on Mon and 7.5 mg all other days of the week. (4.3% dec)  Encouraged to maintain a consistency of vegetables/salads.   Recheck INR in 3 weeks, 8/6    Referring is Dr. Linda Dougherty   INR (no units)   Date Value   06/18/2020 2.9   05/28/2020 2.82 (H)   05/14/2020 1.40 (H)   04/30/2020 1.03   03/27/2020 1.99 (H)     CLINICAL PHARMACY CONSULT: MED RECONCILIATION/REVIEW ADDENDUM    For Pharmacy Admin Tracking Only    PHSO: Yes  Total # of Interventions Recommended: 1  - Decreased Dose #: 1  - Maintenance Safety Lab Monitoring #: 1  Total Interventions Accepted: 1  Time Spent (min): 15    Estuardo Negro, PharmD

## 2020-07-24 RX ORDER — LISINOPRIL 2.5 MG/1
TABLET ORAL
Qty: 30 TABLET | Refills: 1 | Status: SHIPPED | OUTPATIENT
Start: 2020-07-24 | End: 2020-08-24

## 2020-07-24 NOTE — TELEPHONE ENCOUNTER
Medication:   Requested Prescriptions     Pending Prescriptions Disp Refills    lisinopril (PRINIVIL;ZESTRIL) 2.5 MG tablet [Pharmacy Med Name: LISINOPRIL 2.5 MG TABLET] 90 tablet 1     Sig: TAKE 1 TABLET BY MOUTH EVERY DAY       Last Filled:  1/16/20 #90, 1 RF     Patient Phone Number: [phone] (home)     Last appt: 6/10/2019 HTN   Next appt: Visit date not found - patient's daughter will call office to schedule appointment on Monday when she is back in town     Lab Results   Component Value Date     04/16/2020    K 3.8 04/16/2020     04/16/2020    CO2 29 04/16/2020    BUN 14 04/16/2020    CREATININE 0.9 04/16/2020    GLUCOSE 95 04/16/2020    CALCIUM 10.2 04/16/2020    PROT 6.9 03/27/2020    LABALBU 3.9 03/27/2020    BILITOT <0.2 03/27/2020    ALKPHOS 64 03/27/2020    AST 17 03/27/2020    ALT 13 03/27/2020    LABGLOM 59 (A) 04/16/2020    GFRAA >60 04/16/2020    AGRATIO 1.3 03/27/2020    GLOB 3.0 03/27/2020

## 2020-07-30 RX ORDER — TRAMADOL HYDROCHLORIDE 50 MG/1
TABLET ORAL
Qty: 90 TABLET | Refills: 0 | Status: SHIPPED | OUTPATIENT
Start: 2020-07-30 | End: 2020-09-25

## 2020-07-30 NOTE — TELEPHONE ENCOUNTER
Pt's daughter/Daniel notified, is wanting to know if pt can do a VV for the AWV instead of coming in the office.

## 2020-07-30 NOTE — TELEPHONE ENCOUNTER
TRAMADOL HCL 50 MG TABLET     LOV 06/10/2019/ORTIZ  LR 06/16/2020, #90, 0RF    PT WOULD LIKE TO DO A VV FOR HER AWV

## 2020-08-06 ENCOUNTER — ANTI-COAG VISIT (OUTPATIENT)
Dept: PHARMACY | Age: 85
End: 2020-08-06
Payer: MEDICARE

## 2020-08-06 VITALS — TEMPERATURE: 98.1 F

## 2020-08-06 LAB — INTERNATIONAL NORMALIZATION RATIO, POC: 2.6

## 2020-08-06 PROCEDURE — 85610 PROTHROMBIN TIME: CPT

## 2020-08-06 PROCEDURE — 99211 OFF/OP EST MAY X REQ PHY/QHP: CPT

## 2020-08-06 NOTE — PROGRESS NOTES
Dr. Robbie Ashraf   INR (no units)   Date Value   07/16/2020 3.3   06/18/2020 2.9   05/28/2020 2.82 (H)   05/14/2020 1.40 (H)   04/30/2020 1.03   03/27/2020 1.99 (H)     CLINICAL PHARMACY CONSULT: MED RECONCILIATION/REVIEW ADDENDUM    For Pharmacy Admin Tracking Only    PHSO: Yes  Total # of Interventions Recommended: 0  - Maintenance Safety Lab Monitoring #: 1  Total Interventions Accepted: 0  Time Spent (min): 15    Maxim Cooper, PharmD

## 2020-08-21 ENCOUNTER — VIRTUAL VISIT (OUTPATIENT)
Dept: FAMILY MEDICINE CLINIC | Age: 85
End: 2020-08-21
Payer: MEDICARE

## 2020-08-21 PROCEDURE — G0439 PPPS, SUBSEQ VISIT: HCPCS | Performed by: FAMILY MEDICINE

## 2020-08-21 ASSESSMENT — PATIENT HEALTH QUESTIONNAIRE - PHQ9
2. FEELING DOWN, DEPRESSED OR HOPELESS: 0
SUM OF ALL RESPONSES TO PHQ QUESTIONS 1-9: 0
1. LITTLE INTEREST OR PLEASURE IN DOING THINGS: 0
SUM OF ALL RESPONSES TO PHQ QUESTIONS 1-9: 0
SUM OF ALL RESPONSES TO PHQ9 QUESTIONS 1 & 2: 0

## 2020-08-21 NOTE — PROGRESS NOTES
Medicare Annual Wellness Visit  Name: Laverda Opitz Date: 2020   MRN: [id number] Sex: Female   Age: 80 y.o. Ethnicity: Non-/Non    : 1934 Race: Tiffany Gant is here for Medicare AWV and Hypertension    Screenings for behavioral, psychosocial and functional/safety risks, and cognitive dysfunction are all negative except as indicated below. These results, as well as other patient data from the [address] form, are documented in Flowsheets linked to this Encounter. Allergies   Allergen Reactions    Naprosyn [Naproxen] Nausea Only     ABDOMINAL PAIN         Prior to Visit Medications    Medication Sig Taking? Authorizing Provider   traMADol (ULTRAM) 50 MG tablet TAKE 1 TABLET BY MOUTH EVERY 8 HOURS AS NEEDED FOR PAIN FOR UP TO 30 DAYS.  Yes Nilsa Hinton MD   levothyroxine (SYNTHROID) 75 MCG tablet TAKE 1 TABLET BY MOUTH EVERY DAY Yes Nilsa Hinton MD   lisinopril (PRINIVIL;ZESTRIL) 2.5 MG tablet TAKE 1 TABLET BY MOUTH EVERY DAY Yes Nilsa Hinton MD   Riociguat (ADEMPAS) 2.5 MG TABS Take 2.5 mg by mouth 3 times daily Yes Adilene Hernandez MD   ferrous sulfate (IRON 325) 325 (65 Fe) MG tablet Take 1 tablet by mouth 2 times daily Yes Adilene Hernandez MD   spironolactone (ALDACTONE) 25 MG tablet TAKE 1/2 TABLET BY MOUTH EVERY DAY Yes Nilsa Hinton MD   furosemide (LASIX) 20 MG tablet TAKE 1 TABLET BY MOUTH EVERY DAY OR AS DIRECTED BY MD Yes Adilene Hernandez MD   OXYGEN Inhale 2 L into the lungs continuous Yes Historical Provider, MD   warfarin (COUMADIN) 5 MG tablet Hold for  INR greater  Than 3.0  Patient taking differently: Hold for  INR greater  Than 3.0  Managed by [facility] Yes Shahab Otero MD   omeprazole (PRILOSEC) 20 MG delayed release capsule Take 20 mg by mouth daily Yes Historical Provider, MD   docusate sodium (COLACE) 100 MG capsule Take 1 capsule by mouth daily as needed for Constipation Yes Guillermo Torres MD   Multiple Vitamins-Minerals (THERAPEUTIC MULTIVITAMIN-MINERALS) tablet Take 1 tablet by mouth daily Yes Historical Provider, MD   Ascorbic Acid (VITAMIN C) 500 MG tablet Take 500 mg by mouth daily Yes Historical Provider, MD         Past Medical History:   Diagnosis Date    Arthritis     Hyperlipidemia     Hypertension     Hypothyroidism     Lumbar herniated disc     Movement disorder     scoliosis    PAH (pulmonary arterial hypertension) with portal hypertension (HCC)     Pulmonary emboli ([facility] [address] 12/2014    Scoliosis     Scoliosis     Thyroid disease     hypothyroid       Past Surgical History:   Procedure Laterality Date    COLONOSCOPY  7/08    EXCISION / BIOPSY SKIN LESION OF ARM Left 9/18/2019    EXCISION LEFT FOREARM MASS performed by Mindy Ortiz MD at [address]      both knees    NECK SURGERY Right 9/18/2019    EXCISION OF RIGHT NECK MASS performed by Mindy Ortiz MD at [address] History   Problem Relation Age of Onset    High Cholesterol Mother     High Cholesterol Father     Cancer Brother        CareTeam (Including outside providers/suppliers regularly involved in providing care):   Patient Care Team:  Kunal Crockett MD as PCP - General (Family Medicine)  Kunal Crockett MD as PCP - [facility] Empaneled Provider  Mindy Ortiz MD as Surgeon (General Surgery)    Wt Readings from Last 3 Encounters:   09/18/19 150 lb (68 kg)   05/16/19 156 lb (70.8 kg)   02/20/19 160 lb (72.6 kg)     No flowsheet data found. There is no height or weight on file to calculate BMI. Based upon direct observation of the patient, evaluation of cognition reveals recent and remote memory intact.       PHYSICAL EXAMINATION:      [ INSTRUCTIONS:  \"[x]\" Indicates a positive item  \"[]\" Indicates a negative item  -- DELETE ALL ITEMS NOT EXAMINED]  Vital Signs: (As obtained by patient/caregiver or practitioner observation)    Respiratory rate-  Normal, uses Polysaccharide ([id number]) 12/22/1997, 10/13/2011    Td, unspecified formulation 03/14/1983    Zoster Live (Zostavax) 11/21/2013        Health Maintenance   Topic Date Due    DEXA (modify frequency per FRAX score)  11/04/1989    Shingles Vaccine (2 of 3) 01/16/2014    Annual Wellness Visit (AWV)  05/29/2019    Flu vaccine (1) 09/01/2020    TSH testing  10/21/2020    Potassium monitoring  04/16/2021    Creatinine monitoring  04/16/2021    DTaP/Tdap/Td vaccine (2 - Tdap) 10/18/2022    Pneumococcal 65+ years Vaccine  Completed    Hepatitis A vaccine  Aged Out    Hepatitis B vaccine  Aged Out    Hib vaccine  Aged Out    Meningococcal (ACWY) vaccine  Aged Out     Recommendations for [employer] Due: see orders and patient instructions/AVS.  . Recommended screening schedule for the next 5-10 years is provided to the patient in written form: see Patient Instructions/AVS.    Mikal Almaraz was seen today for medicare awv and hypertension. Diagnoses and all orders for this visit:    Medicare annual wellness visit, subsequent/Routine general medical examination at a health care facility  Return 1 year  Hypothyroidism, unspecified type  -     TSH with Reflex; Future  Await lab to determine stability. Continue levothyroxine  Pulmonary hypertension (HCC)/CTEPH (chronic thromboembolic pulmonary hypertension) ([facility] [address]  Patient is under the care of Dr. Myron Contreras  She continues with 24-hour oxygen  Other idiopathic scoliosis, thoracolumbar region  No new issues. Patient uses a walker for stability            Cisco Fernandez is a 80 y.o. female being evaluated by a Virtual Visit (video and audio) encounter to address concerns as mentioned above. A caregiver was present when appropriate. Due to this being a TeleHealth encounter (During [facility]- public health emergency), evaluation of the following organ systems was limited: Vitals/Constitutional/EENT/Resp/CV/GI//MS/Neuro/Skin/Heme-Lymph-Imm.   Pursuant to the emergency declaration under the [facility], [address] [place] [address] authority and the [employer] Act, this Virtual Visit was conducted with patient's (and/or legal guardian's) consent, to reduce the patient's risk of exposure to COVID-19 and provide necessary medical care. The patient (and/or legal guardian) has also been advised to contact this office for worsening conditions or problems, and seek emergency medical treatment and/or call 911 if deemed necessary. Patient identification was verified at the start of the visit: Yes    Services were provided through a video synchronous discussion virtually to substitute for in-person clinic visit. Patient and provider were located at their individual homes. --Sonja Gardiner MD on 8/21/2020 at 1:27 PM    An electronic signature was used to authenticate this note.

## 2020-08-21 NOTE — PATIENT INSTRUCTIONS
Personalized Preventive Plan for Doyle Powell - 8/21/2020  Medicare offers a range of preventive health benefits. Some of the tests and screenings are paid in full while other may be subject to a deductible, co-insurance, and/or copay. Some of these benefits include a comprehensive review of your medical history including lifestyle, illnesses that may run in your family, and various assessments and screenings as appropriate. After reviewing your medical record and screening and assessments performed today your provider may have ordered immunizations, labs, imaging, and/or referrals for you. A list of these orders (if applicable) as well as your Preventive Care list are included within your After Visit Summary for your review. Other Preventive Recommendations:    · A preventive eye exam performed by an eye specialist is recommended every 1-2 years to screen for glaucoma; cataracts, macular degeneration, and other eye disorders. · A preventive dental visit is recommended every 6 months. · Try to get at least 150 minutes of exercise per week or 10,000 steps per day on a pedometer . · Order or download the FREE \"Exercise & Physical Activity: Your Everyday Guide\" from [employer]. Call 0-805.610.8530 or search [employer] online. · You need 9893-7468 mg of calcium and 9233-5050 IU of vitamin D per day. It is possible to meet your calcium requirement with diet alone, but a vitamin D supplement is usually necessary to meet this goal.  · When exposed to the sun, use a sunscreen that protects against both UVA and UVB radiation with an SPF of 30 or greater. Reapply every 2 to 3 hours or after sweating, drying off with a towel, or swimming. · Always wear a seat belt when traveling in a car. Always wear a helmet when riding a bicycle or motorcycle.

## 2020-08-24 RX ORDER — LISINOPRIL 2.5 MG/1
TABLET ORAL
Qty: 90 TABLET | Refills: 3 | Status: SHIPPED | OUTPATIENT
Start: 2020-08-24 | End: 2021-07-19

## 2020-08-24 RX ORDER — LEVOTHYROXINE SODIUM 0.07 MG/1
TABLET ORAL
Qty: 90 TABLET | Refills: 3 | Status: SHIPPED | OUTPATIENT
Start: 2020-08-24 | End: 2021-07-19

## 2020-08-24 NOTE — TELEPHONE ENCOUNTER
Medication:   Requested Prescriptions     Pending Prescriptions Disp Refills    lisinopril (PRINIVIL;ZESTRIL) 2.5 MG tablet [Pharmacy Med Name: LISINOPRIL 2.5 MG TABLET] 30 tablet 1     Sig: TAKE 1 TABLET BY MOUTH EVERY DAY    levothyroxine (SYNTHROID) 75 MCG tablet [Pharmacy Med Name: LEVOTHYROXINE 75 MCG TABLET] 30 tablet 1     Sig: TAKE 1 TABLET BY MOUTH EVERY DAY       Last Filled:  Lisinopril 7/24/20 #30, 1 RF  Synthroid 7/27/20 #30, 1 RF   Patient Phone Number: [phone] (home)     Last appt: 8/21/2020 AWV, HTN   Next appt: Visit date not found    Lab Results   Component Value Date     04/16/2020    K 3.8 04/16/2020     04/16/2020    CO2 29 04/16/2020    BUN 14 04/16/2020    CREATININE 0.9 04/16/2020    GLUCOSE 95 04/16/2020    CALCIUM 10.2 04/16/2020    PROT 6.9 03/27/2020    LABALBU 3.9 03/27/2020    BILITOT <0.2 03/27/2020    ALKPHOS 64 03/27/2020    AST 17 03/27/2020    ALT 13 03/27/2020    LABGLOM 59 (A) 04/16/2020    GFRAA >60 04/16/2020    AGRATIO 1.3 03/27/2020    GLOB 3.0 03/27/2020

## 2020-09-01 ENCOUNTER — ANTI-COAG VISIT (OUTPATIENT)
Dept: PHARMACY | Age: 85
End: 2020-09-01
Payer: MEDICARE

## 2020-09-01 VITALS — TEMPERATURE: 98.7 F

## 2020-09-01 LAB — INTERNATIONAL NORMALIZATION RATIO, POC: 2.9

## 2020-09-01 PROCEDURE — 99211 OFF/OP EST MAY X REQ PHY/QHP: CPT

## 2020-09-01 PROCEDURE — 85610 PROTHROMBIN TIME: CPT

## 2020-09-01 NOTE — PROGRESS NOTES
Ms. Evans Harvey is a 80 y.o. 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 Took Xarelto for the 3 years after her first PE. Then developed \"several small clots\" as it was described. She was then switched to warfarin 5mg as of 12/3/18. Pertinent PMH: HTN, Hx of PE,     Patient Reported Findings:  Yes     No  [x]   []       Patient & son verifies current dosing regimen as listed home nurse that takes care of medications   Per cardio note on 5/1, was told to resume warfarin at same weekly dose of 7.5 mg daily ---> son confirmed dose ---> Daughter accompanied patient today- doesn't do medications, not sure of dosing since has caretaker. She will check when she gets home to confirm dose. --> pt confirms dose   []   [x]       S/S bleeding/bruising/swelling/SOB- denies   []   [x]       Blood in urine or stool- denies   []   [x]       Procedures scheduled in the future at this time   [x]   []       Missed Dose-  Denies    []   [x]       Extra Dose- denies   [x]   []       Change in medications started taking iron and stool softener---> no changes   []   [x]       Change in health/diet/appetite denies changes   []   [x]       Change in alcohol use  []   [x]       Change in activity  []   [x]       Hospital admission  []   [x]       Emergency department visit  []   [x]       Other complaints     Clinical Outcomes:  Yes     No  []   [x]       Major bleeding event  []   [x]       Thromboembolic event    Duration of warfarin Therapy: indefinitely  INR Range:  2.0-3.0     Daughter accompanied patient today- doesn't do medications, not sure of dosing since has caretaker. INR is 2.9 today   Continue weekly dose of 10mg on Mon and 7.5 mg all other days of the week. Encouraged to maintain a consistency of vegetables/salads.   Recheck INR in 4 weeks, 9/29    Referring is Dr. Nando Christensen   INR (no units)   Date Value   09/01/2020 2.9   08/06/2020 2.6 07/16/2020 3.3   06/18/2020 2.9   05/28/2020 2.82 (H)   05/14/2020 1.40 (H)   04/30/2020 1.03   03/27/2020 1.99 (H)     CLINICAL PHARMACY CONSULT: MED RECONCILIATION/REVIEW ADDENDUM    For Pharmacy Admin Tracking Only    PHSO: Yes  Total # of Interventions Recommended: 0  - Maintenance Safety Lab Monitoring #: 1  Total Interventions Accepted: 0  Time Spent (min): [address] [facility] Payton, PharmD

## 2020-09-14 RX ORDER — SPIRONOLACTONE 25 MG/1
TABLET ORAL
Qty: 45 TABLET | Refills: 3 | Status: SHIPPED | OUTPATIENT
Start: 2020-09-14 | End: 2020-09-30 | Stop reason: ALTCHOICE

## 2020-09-14 NOTE — TELEPHONE ENCOUNTER
Medication:   Requested Prescriptions     Pending Prescriptions Disp Refills    spironolactone (ALDACTONE) 25 MG tablet [Pharmacy Med Name: SPIRONOLACTONE 25 MG TABLET] 45 tablet 1     Sig: TAKE 1/2 TABLET BY MOUTH EVERY DAY       Last Filled:  3/25/20 #90, 0 RF     Patient Phone Number: [phone] (home)     Last appt: 8/21/2020 AWV, HTN   Next appt: Visit date not found    Lab Results   Component Value Date     04/16/2020    K 3.8 04/16/2020     04/16/2020    CO2 29 04/16/2020    BUN 14 04/16/2020    CREATININE 0.9 04/16/2020    GLUCOSE 95 04/16/2020    CALCIUM 10.2 04/16/2020    PROT 6.9 03/27/2020    LABALBU 3.9 03/27/2020    BILITOT <0.2 03/27/2020    ALKPHOS 64 03/27/2020    AST 17 03/27/2020    ALT 13 03/27/2020    LABGLOM 59 (A) 04/16/2020    GFRAA >60 04/16/2020    AGRATIO 1.3 03/27/2020    GLOB 3.0 03/27/2020

## 2020-09-25 RX ORDER — TRAMADOL HYDROCHLORIDE 50 MG/1
TABLET ORAL
Qty: 90 TABLET | Refills: 0 | Status: SHIPPED | OUTPATIENT
Start: 2020-09-25 | End: 2020-11-25

## 2020-09-28 NOTE — TELEPHONE ENCOUNTER
Warfarin prescription phoned into [facility] to [address] [facility] under Dr. Phan Co  Warfarin 5 mg tabs  Take 10 mg on Mon and 7.5 mg all other days of the week  90 days   2 refills

## 2020-09-28 NOTE — PROGRESS NOTES
[address]  Advanced CHF/Pulmonary Hypertension   Cardiac Evaluation      Flavia Montanez  YOB: 1934    Date of Visit:  9/30/20      Chief Complaint   Patient presents with    6 Month Follow-Up     Echo    Other     [address]        History of Present Illness:  Flavia Montanez is a 80 y.o. female who presents for hospital follow up from [facility] after presenting on 11/28/18 with leg pain and swelling. She has a history of HTN, hyperlipidemia, pulmonary embolus, thyroid disease who presents to the ED with left posterior knee and calf pain with swelling for the past 2 days. She typically ambulates with a walker and is still able to ambulate. She was found to have hypoxia with oxygen saturations in the high 80's. She denied shortness of breath. Son was is at bedside and he had not noticed her to be short of breath. Since her initial PE was diagnosed, she had been on Xarelto. VQ scan showed high probability of VQ scan and was diagnosed with CTEPH. She was also fluid overloaded during her hospitalization and was diuresed with IV furosemide. She developed ARF and ace-I was held temporarily then dose was reduced. She was not discharged on a diuretic although she was sent home with oxygen. Patient had been on Xarelto sine 2014. Changed to Coumadin by Dr. Malorie Miramontes. She was started on Lasix on 12-12-18 (Mon, Wed and Fri) based off of labs. Her INRs are followed in [facility] clinic. She sees Dr. Dennis Macias for pulmonology. Patient lives alone and has a stair lift in her home. Today she is here for follow up for [address] on Adempas for CTEPH which was started in October 2019. She states she feels \"fine. \" She is wearing her oxygen. Her RVSP has improved to 25mmHg from 77mmHg. She uses a walker at home. Today she is in a wheel chair and accompanied by her daughter. Encouraged to spot check her oxygen at home.   It is possible that her oxygen can be reduced and weaned. WHO group 4. Class 3. Allergies   Allergen Reactions    Naprosyn [Naproxen] Nausea Only     ABDOMINAL PAIN     Current Outpatient Medications   Medication Sig Dispense Refill    traMADol (ULTRAM) 50 MG tablet TAKE 1 TABLET BY MOUTH EVERY 8 HOURS AS NEEDED FOR PAIN FOR UP TO 30 DAYS. 90 tablet 0    lisinopril (PRINIVIL;ZESTRIL) 2.5 MG tablet TAKE 1 TABLET BY MOUTH EVERY DAY 90 tablet 3    levothyroxine (SYNTHROID) 75 MCG tablet TAKE 1 TABLET BY MOUTH EVERY DAY 90 tablet 3    Riociguat (ADEMPAS) 2.5 MG TABS Take 2.5 mg by mouth 3 times daily 90 tablet 11    ferrous sulfate (IRON 325) 325 (65 Fe) MG tablet Take 1 tablet by mouth 2 times daily 180 tablet 3    OXYGEN Inhale 2 L into the lungs continuous      warfarin (COUMADIN) 5 MG tablet Hold for  INR greater  Than 3.0 (Patient taking differently: Hold for  INR greater  Than 3.0  Managed by [facility]) 15 tablet 0    omeprazole (PRILOSEC) 20 MG delayed release capsule Take 20 mg by mouth daily      docusate sodium (COLACE) 100 MG capsule Take 1 capsule by mouth daily as needed for Constipation 30 capsule 2    Multiple Vitamins-Minerals (THERAPEUTIC MULTIVITAMIN-MINERALS) tablet Take 1 tablet by mouth daily      Ascorbic Acid (VITAMIN C) 500 MG tablet Take 500 mg by mouth daily      spironolactone (ALDACTONE) 25 MG tablet TAKE 1/2 TABLET BY MOUTH EVERY DAY (Patient not taking: Reported on 9/30/2020) 45 tablet 3    furosemide (LASIX) 20 MG tablet TAKE 1 TABLET BY MOUTH EVERY DAY OR AS DIRECTED BY MD (Patient not taking: Reported on 9/30/2020) 30 tablet 11     No current facility-administered medications for this visit.         Past Medical History:   Diagnosis Date    Arthritis     Hyperlipidemia     Hypertension     Hypothyroidism     Lumbar herniated disc     Movement disorder     scoliosis    PAH (pulmonary arterial hypertension) with portal hypertension (HCC)     Pulmonary emboli ([address] 12/2014    Scoliosis     Narrative    Not on file       Review of Systems:   · Constitutional: there has been no unanticipated weight loss. There's been no change in energy level, sleep pattern, or activity level. · Eyes: No visual changes or diplopia. No scleral icterus. · ENT: No Headaches, hearing loss or vertigo. No mouth sores or sore throat. · Cardiovascular: Reviewed in HPI  · Respiratory: No cough or wheezing, no sputum production. No hematemesis. · Gastrointestinal: No abdominal pain, appetite loss, blood in stools. No change in bowel or bladder habits. · Genitourinary: No dysuria, trouble voiding, or hematuria. · Musculoskeletal:  No gait disturbance, weakness or joint complaints. · Integumentary: No rash or pruritis. · Neurological: No headache, diplopia, change in muscle strength, numbness or tingling. No change in gait, balance, coordination, mood, affect, memory, mentation, behavior. · Psychiatric: No anxiety, no depression. · Endocrine: No malaise, fatigue or temperature intolerance. No excessive thirst, fluid intake, or urination. No tremor. · Hematologic/Lymphatic: No abnormal bruising or bleeding, blood clots or swollen lymph nodes. · Allergic/Immunologic: No nasal congestion or hives. Physical Examination:    Vitals:    09/30/20 1138   BP: 114/68   Site: Left Upper Arm   Position: Sitting   Cuff Size: Medium Adult   Pulse: 62   SpO2: 93%   Height: 5' 1\" (1.549 m)     Body mass index is 28.34 kg/m².      Wt Readings from Last 3 Encounters:   09/18/19 150 lb (68 kg)   05/16/19 156 lb (70.8 kg)   02/20/19 160 lb (72.6 kg)     BP Readings from Last 3 Encounters:   09/30/20 114/68   05/14/20 102/68   03/27/20 (!) 92/40     Constitutional and General Appearance:   WD/WN in NAD, wears oxygen 24/7  HEENT:  NC/AT  [place]  No problems with hearing  Skin:  Warm, dry  Respiratory:  · Normal excursion and expansion without use of accessory muscles  · Resp Auscultation: Normal breath sounds without dullness  Cardiovascular:  · The apical impulses not displaced  · Heart tones are crisp and normal  · Cervical veins are not engorged  · The carotid upstroke is normal in amplitude and contour without delay or bruit  · JVP less than 8 cm H2O  RRR with nl S1 and S2 without m,r,g  · Peripheral pulses are symmetrical and full  · There is no clubbing, cyanosis of the extremities. · No edema  · Femoral Arteries: 2+ and equal  · Pedal Pulses: 2+ and equal   Neck:  · No thyromegaly  Abdomen:  · No masses or tenderness  · Liver/Spleen: No Abnormalities Noted  Neurological/Psychiatric:  · Alert and oriented in all spheres  · Moves all extremities well  · Exhibits normal gait balance and coordination  · No abnormalities of mood, affect, memory, mentation, or behavior are noted      Recent hospitalization and testin-20-19 at 1:45pm.   6 minute Walk. Completed 4 laps. 73.2 meters walked/240 feet. Walked 4 minutes  [place]  118/78  74  Pulse ox  92% on 2 liters NC    Immediate post walk   142/80  116  91% on 2 liters NC    5 minutes post walk  96/76  105  93% on 2 liters NC      19 Echo  Summary   -Normal left ventricle size and wall thickness. Hyperdynamic systolic   function with an estimated ejection fraction of 65-70%. No regional wall   motion abnormalities are seen. E/e\"= 9.4 .   -Grade I diastolic dysfunction with normal LV filling pressures.   -Moderate aortic stenosis with a peak gradient of 37 mmHg and a mean   pressure gradient of 19mmHg.   -Mild mitral regurgitation.   -Mild to moderate tricuspid regurgitation.   -The right atrium is mildly dilated.   -Estimated pulmonary artery systolic pressure is severely elevated at 77   mmHg assuming a right atrial pressure of 3 mmHg.     Carotid duplex :  YINKA < 50%, LICA - no significant stenosis    VQ scan 18: high probability for PE     Echo 18   -Normal left ventricle size, wall thickness, and systolic function with EF 70% 3.02m/s and a mean gradient 25 mmHg. The aortic valve area - 1.08 cm^2. No significant regurgitation noted.   -Mild-to-moderate tricuspid regurgitation with a RVSP estimation of 70 mmHg, suggestive of severe pulm HTN.    -Normal diastolic function. E/e'=10.3    CT chest pulmonary 11/28/18 : no evidence or proximal PE but enlarged main pulmonary artery which can be seen with pulmonary HTN    Left venous dulex 11/28/18 : no evidence of deep vein or superficial vein thrombosis involving LLE or right common femoral vein. Labs were reviewed including labs from other hospital systems through [facility]. Cardiac testing was reviewed including echos, nuclear scans, cardiac catheterization, including from other hospital systems through [facility]. Assessment:    1. CTEPH (chronic thromboembolic pulmonary hypertension) ([address]    2. SOB (shortness of breath)    3. Iron deficiency anemia, unspecified iron deficiency anemia type    4. Other pulmonary embolism without acute cor pulmonale, unspecified chronicity ([facility]    5. Essential hypertension    6. Nonrheumatic aortic valve stenosis        1. CTEPH (chronic thromboembolic pulmonary hypertension) (HCC)   PAP increased to 77mmHg. Start Adempas 0.5mg three times a day Oct 2019. Side effects explained to patient and son Winter Oates. May need to decrease or DC Norvasc for benefits of Adempas. Uptitrate as tolerated with help of home health. Now on Adempas 2.5mg TID. -Echo on 2-20-19  With pulmonary artery systolic pressure is severely elevated at 77 mmHg assuming a right atrial pressure of 3 mmHg. - Echo today shows RVSP of 25mmHg. 2. SOB (shortness of breath):  Stable. 3. Iron deficiency anemia, unspecified iron deficiency anemia type:  Diagnosed with labs done after office    4. Other pulmonary embolism without acute cor pulmonale, unspecified chronicity ([address]):  -Stable. Continue Coumadin as directed.       5. Essential hypertension: /68 (Site: Left Upper Arm, Position: Sitting, Cuff Size: Medium Adult)   Pulse 62   Ht 5' 1\" (1.549 m)   SpO2 93%   BMI 28.34 kg/m²    -Stable. Denies dizziness. 6. Nonrheumatic aortic valve stenosis          Plan:  1. Continue same medications including Adempas. 2.   Check oxygen level on occasions without oxygen on. Normal is greater than 90%. 3.   See Dr. Jadiel Romero in 6 months. 4.   Labs today. Scribe's attestation: This note was scribed in the presence of Mariel Sharp M.D. by Lonnie Mai RN     The scribe's documentation has been prepared under my direction and personally reviewed by me in its entirety. I confirm that the note above accurately reflects all work, treatment, procedures, and medical decision making performed by me. Spent 50 minutes with patient reviewing chart, examining patient, and developing plan of care. I have spent  40  minutes of face to face time with the patient with more than 50%  spent  counseling and coordinating care for pulmonary hypertension, anemia, oxygen use. QUALITY MEASURES  1. Tobacco Cessation Counseling:  NA  2. Retake of BP if >140/90:   NA  3. Documentation to PCP/referring for new patient:  Sent to PCP at close of office visit  4. CAD patient on anti-platelet: No  5. CAD patient on STATIN therapy:  No  6. Patient with CHF and aFib on anticoagulation:  Yes   Anticoagulation for hx of PE      I appreciate the opportunity of cooperating in the care of this patient.     Mariel Sharp M.D., [address] [facility]

## 2020-09-30 ENCOUNTER — HOSPITAL ENCOUNTER (OUTPATIENT)
Dept: NON INVASIVE DIAGNOSTICS | Age: 85
Discharge: HOME | End: 2020-09-30
Payer: MEDICARE

## 2020-09-30 ENCOUNTER — OFFICE VISIT (OUTPATIENT)
Dept: CARDIOLOGY CLINIC | Age: 85
End: 2020-09-30
Payer: MEDICARE

## 2020-09-30 ENCOUNTER — ANTI-COAG VISIT (OUTPATIENT)
Dept: PHARMACY | Age: 85
End: 2020-09-30
Payer: MEDICARE

## 2020-09-30 ENCOUNTER — HOSPITAL ENCOUNTER (OUTPATIENT)
Age: 85
Discharge: HOME | End: 2020-09-30
Payer: MEDICARE

## 2020-09-30 VITALS — TEMPERATURE: 96.8 F

## 2020-09-30 VITALS
BODY MASS INDEX: 28.34 KG/M2 | DIASTOLIC BLOOD PRESSURE: 68 MMHG | HEART RATE: 62 BPM | HEIGHT: 61 IN | SYSTOLIC BLOOD PRESSURE: 114 MMHG | OXYGEN SATURATION: 93 %

## 2020-09-30 LAB
A/G RATIO: 1.6 (ref 1.1–2.2)
ALBUMIN SERPL-MCNC: 4 G/DL (ref 3.4–5)
ALP BLD-CCNC: 69 U/L (ref 40–129)
ALT SERPL-CCNC: 13 U/L (ref 10–40)
ANION GAP SERPL CALCULATED.3IONS-SCNC: 9 MMOL/L (ref 3–16)
AST SERPL-CCNC: 18 U/L (ref 15–37)
BILIRUB SERPL-MCNC: 0.3 MG/DL (ref 0–1)
BUN BLDV-MCNC: 19 MG/DL (ref 7–20)
CALCIUM SERPL-MCNC: 10.2 MG/DL (ref 8.3–10.6)
CHLORIDE BLD-SCNC: 101 MMOL/L (ref 99–110)
CO2: 29 MMOL/L (ref 21–32)
CREAT SERPL-MCNC: 0.7 MG/DL (ref 0.6–1.2)
GFR AFRICAN AMERICAN: >60
GFR NON-AFRICAN AMERICAN: >60
GLOBULIN: 2.5 G/DL
GLUCOSE BLD-MCNC: 94 MG/DL (ref 70–99)
HCT VFR BLD CALC: 44.7 % (ref 36–48)
HEMOGLOBIN: 14.6 G/DL (ref 12–16)
INTERNATIONAL NORMALIZATION RATIO, POC: 2.8
LV EF: 70 %
LVEF MODALITY: NORMAL
MCH RBC QN AUTO: 29.6 PG (ref 26–34)
MCHC RBC AUTO-ENTMCNC: 32.7 G/DL (ref 31–36)
MCV RBC AUTO: 90.3 FL (ref 80–100)
PDW BLD-RTO: 16.3 % (ref 12.4–15.4)
PLATELET # BLD: 282 K/UL (ref 135–450)
PMV BLD AUTO: 9.8 FL (ref 5–10.5)
POTASSIUM SERPL-SCNC: 4.8 MMOL/L (ref 3.5–5.1)
PRO-BNP: 92 PG/ML (ref 0–449)
RBC # BLD: 4.95 M/UL (ref 4–5.2)
SODIUM BLD-SCNC: 139 MMOL/L (ref 136–145)
TOTAL PROTEIN: 6.5 G/DL (ref 6.4–8.2)
WBC # BLD: 6.8 K/UL (ref 4–11)

## 2020-09-30 PROCEDURE — 85610 PROTHROMBIN TIME: CPT

## 2020-09-30 PROCEDURE — 99211 OFF/OP EST MAY X REQ PHY/QHP: CPT

## 2020-09-30 PROCEDURE — 99215 OFFICE O/P EST HI 40 MIN: CPT | Performed by: INTERNAL MEDICINE

## 2020-09-30 PROCEDURE — 83880 ASSAY OF NATRIURETIC PEPTIDE: CPT

## 2020-09-30 PROCEDURE — 80053 COMPREHEN METABOLIC PANEL: CPT

## 2020-09-30 PROCEDURE — 93306 TTE W/DOPPLER COMPLETE: CPT

## 2020-09-30 PROCEDURE — 85027 COMPLETE CBC AUTOMATED: CPT

## 2020-09-30 PROCEDURE — 36415 COLL VENOUS BLD VENIPUNCTURE: CPT

## 2020-09-30 RX ORDER — FUROSEMIDE 20 MG/1
TABLET ORAL
Qty: 30 TABLET | Refills: 11
Start: 2020-09-30 | End: 2021-05-11 | Stop reason: SDUPTHER

## 2020-09-30 NOTE — PROGRESS NOTES
Ms. Crystal Penn is a 80 y.o. 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 Took Xarelto for the 3 years after her first PE. Then developed \"several small clots\" as it was described. She was then switched to warfarin 5mg as of 12/3/18. Pertinent PMH: HTN, Hx of PE,     Patient Reported Findings:  Yes     No  [x]   []       Patient & son verifies current dosing regimen as listed home nurse that takes care of medications   Per cardio note on 5/1, was told to resume warfarin at same weekly dose of 7.5 mg daily ---> son confirmed dose ---> Daughter accompanied patient today- doesn't do medications, not sure of dosing since has caretaker. She will check when she gets home to confirm dose. --> pt confirms dose   []   [x]       S/S bleeding/bruising/swelling/SOB- denies   []   [x]       Blood in urine or stool- denies   []   [x]       Procedures scheduled in the future at this time   []   [x]       Missed Dose-  Denies    []   [x]       Extra Dose- denies   []   [x]       Change in medications started taking iron and stool softener---> no changes   []   [x]       Change in health/diet/appetite denies changes   []   [x]       Change in alcohol use  []   [x]       Change in activity  []   [x]       Hospital admission  []   [x]       Emergency department visit  []   [x]       Other complaints     Clinical Outcomes:  Yes     No  []   [x]       Major bleeding event  []   [x]       Thromboembolic event    Duration of warfarin Therapy: indefinitely  INR Range:  2.0-3.0     Daughter accompanied patient today- doesn't do medications, not sure of dosing since has caretaker. INR is 2.9 today   Continue weekly dose of 10mg on Mon and 7.5 mg all other days of the week. Encouraged to maintain a consistency of vegetables/salads.   Recheck INR in 4 weeks, 10/29 per pt request    Referring is Dr. Linda Dougherty   INR (no units)   Date Value   09/30/2020 2.8 09/01/2020 2.9   08/06/2020 2.6   07/16/2020 3.3   05/28/2020 2.82 (H)   05/14/2020 1.40 (H)   04/30/2020 1.03   03/27/2020 1.99 (H)     CLINICAL PHARMACY CONSULT: MED RECONCILIATION/REVIEW ADDENDUM    For Pharmacy Admin Tracking Only    PHSO: Yes  Total # of Interventions Recommended: 0  - Maintenance Safety Lab Monitoring #: 1  Total Interventions Accepted: 0  Time Spent (min): [address], PharmD

## 2020-09-30 NOTE — PATIENT INSTRUCTIONS
Plan:  1. Continue same medications. 2.   Check oxygen level on occasions without oxygen on. Normal is greater than 90%. 3.   See Dr. Blaze Dickens in 6 months. 4.   Labs today.

## 2020-10-29 ENCOUNTER — ANTI-COAG VISIT (OUTPATIENT)
Dept: PHARMACY | Age: 85
End: 2020-10-29
Payer: MEDICARE

## 2020-10-29 VITALS — TEMPERATURE: 97.7 F

## 2020-10-29 LAB — INTERNATIONAL NORMALIZATION RATIO, POC: 2.2

## 2020-10-29 PROCEDURE — 85610 PROTHROMBIN TIME: CPT

## 2020-10-29 PROCEDURE — 99211 OFF/OP EST MAY X REQ PHY/QHP: CPT

## 2020-10-29 NOTE — PROGRESS NOTES
Ms. Ciara Alcantar is a 80 y.o. 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 Took Xarelto for the 3 years after her first PE. Then developed \"several small clots\" as it was described. She was then switched to warfarin 5mg as of 12/3/18. Pertinent PMH: HTN, Hx of PE,     Patient Reported Findings:  Yes     No  [x]   []       Patient & son verifies current dosing regimen as listed home nurse that takes care of medications    Daughter accompanied patient today- doesn't do medications, not sure of dosing since has caretaker. She will check when she gets home to confirm dose. --> pt confirms dose   []   [x]       S/S bleeding/bruising/swelling/SOB- denies   []   [x]       Blood in urine or stool- denies   []   [x]       Procedures scheduled in the future at this time   []   [x]       Missed Dose-  Denies    []   [x]       Extra Dose- denies   []   [x]       Change in medications started taking iron and stool softener---> no changes   []   [x]       Change in health/diet/appetite denies changes   []   [x]       Change in alcohol use  []   [x]       Change in activity  []   [x]       Hospital admission  []   [x]       Emergency department visit  []   [x]       Other complaints     Clinical Outcomes:  Yes     No  []   [x]       Major bleeding event  []   [x]       Thromboembolic event    Duration of warfarin Therapy: indefinitely  INR Range:  2.0-3.0     Daughter accompanied patient today- doesn't do medications, not sure of dosing since has caretaker. INR is 2.9 today   Continue weekly dose of 10mg on Mon and 7.5 mg all other days of the week. Encouraged to maintain a consistency of vegetables/salads.   Recheck INR in 5 weeks, 12/3    Referring is Dr. Andrew Kan   INR (no units)   Date Value   09/30/2020 2.8   09/01/2020 2.9   08/06/2020 2.6   07/16/2020 3.3   05/28/2020 2.82 (H)   05/14/2020 1.40 (H)   04/30/2020 1.03   03/27/2020 1.99 (H) CLINICAL PHARMACY CONSULT: MED RECONCILIATION/REVIEW ADDENDUM    For Pharmacy Admin Tracking Only    PHSO: Yes  Total # of Interventions Recommended: 0  - Maintenance Safety Lab Monitoring #: 1  Total Interventions Accepted: 0  Time Spent (min): 15    Serena GrimmD

## 2020-11-25 RX ORDER — TRAMADOL HYDROCHLORIDE 50 MG/1
TABLET ORAL
Qty: 90 TABLET | Refills: 0 | Status: SHIPPED | OUTPATIENT
Start: 2020-11-25 | End: 2021-06-01

## 2020-11-25 NOTE — TELEPHONE ENCOUNTER
Medication:   Requested Prescriptions     Pending Prescriptions Disp Refills    traMADol (ULTRAM) 50 MG tablet [Pharmacy Med Name: TRAMADOL HCL 50 MG TABLET] 90 tablet 0     Sig: TAKE 1 TABLET BY MOUTH EVERY 8 HOURS AS NEEDED FOR PAIN FOR UP TO 30 DAYS. Last Filled:  09/25/2020 #90 0rf    Patient Phone Number: [phone] (home)     Last appt: 8/21/2020 VV  Next appt: Visit date not found    Last OARRS:   RX Monitoring 6/16/2020   Attestation -   Periodic Controlled Substance Monitoring No signs of potential drug abuse or diversion identified.

## 2020-11-30 ENCOUNTER — TELEPHONE (OUTPATIENT)
Dept: FAMILY MEDICINE CLINIC | Age: 85
End: 2020-11-30

## 2020-11-30 NOTE — TELEPHONE ENCOUNTER
----- Message from [address] [facility] sent at 11/30/2020 11:04 AM EST -----  Subject: Message to Provider    QUESTIONS  Information for Provider? Patient is wanting to get a urine test done as   she thinks she has a UTI. She has no symptoms of it worsening. She has   fallen twice and says she only falls when she has a UTI. Would like a call   back to schedule a urine test or UTI appointment.   ---------------------------------------------------------------------------  --------------  [employer]  What is the best way for the office to contact you? OK to leave message on   voicemail  Preferred Call Back Phone Number? [phone]  ---------------------------------------------------------------------------  --------------  SCRIPT ANSWERS  Relationship to Patient? Self  Appointment reason? Symptomatic  Select script based on patient symptoms? Adult Female Urinary Symptoms   [Urine-related   UTI   Bladder Infection]  Are you having back pain with your urinary symptoms? No  Are you having vomiting or nausea? No  Are you having fevers (100.4)   chills   or sweats? No  Are you having increased thirst? No  Is there blood in your urine? No  Have you been seen by a provider for this symptom before?  Yes

## 2020-12-01 ENCOUNTER — NURSE ONLY (OUTPATIENT)
Dept: FAMILY MEDICINE CLINIC | Age: 85
End: 2020-12-01
Payer: MEDICARE

## 2020-12-01 LAB
BILIRUBIN, POC: NORMAL
BLOOD URINE, POC: NORMAL
CLARITY, POC: NORMAL
COLOR, POC: YELLOW
GLUCOSE URINE, POC: NORMAL
KETONES, POC: NORMAL
LEUKOCYTE EST, POC: NORMAL
NITRITE, POC: NORMAL
PH, POC: 6
PROTEIN, POC: NORMAL
SPECIFIC GRAVITY, POC: 1.02
UROBILINOGEN, POC: 0.2

## 2020-12-01 PROCEDURE — 81002 URINALYSIS NONAUTO W/O SCOPE: CPT | Performed by: FAMILY MEDICINE

## 2020-12-03 ENCOUNTER — TELEPHONE (OUTPATIENT)
Dept: FAMILY MEDICINE CLINIC | Age: 85
End: 2020-12-03

## 2020-12-03 ENCOUNTER — ANTI-COAG VISIT (OUTPATIENT)
Dept: PHARMACY | Age: 85
End: 2020-12-03
Payer: MEDICARE

## 2020-12-03 VITALS — TEMPERATURE: 97.1 F

## 2020-12-03 LAB
INTERNATIONAL NORMALIZATION RATIO, POC: 2.9
ORGANISM: ABNORMAL
URINE CULTURE, ROUTINE: ABNORMAL

## 2020-12-03 PROCEDURE — 99211 OFF/OP EST MAY X REQ PHY/QHP: CPT

## 2020-12-03 PROCEDURE — 85610 PROTHROMBIN TIME: CPT

## 2020-12-03 RX ORDER — NITROFURANTOIN 25; 75 MG/1; MG/1
100 CAPSULE ORAL 2 TIMES DAILY
Qty: 14 CAPSULE | Refills: 0 | Status: SHIPPED | OUTPATIENT
Start: 2020-12-03 | End: 2020-12-10

## 2020-12-03 NOTE — PROGRESS NOTES
Ms. Roxi Hernandez is a 80 y.o. 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 Took Xarelto for the 3 years after her first PE. Then developed \"several small clots\" as it was described. She was then switched to warfarin 5mg as of 12/3/18. Pertinent PMH: HTN, Hx of PE,     Patient Reported Findings:  Yes     No  [x]   []       Patient & son verifies current dosing regimen as listed home nurse that takes care of medications    Daughter accompanied patient today- doesn't do medications, not sure of dosing since has caretaker. She will check when she gets home to confirm dose. --> pt confirms dose   []   [x]       S/S bleeding/bruising/swelling/SOB- denies   []   [x]       Blood in urine or stool- denies   []   [x]       Procedures scheduled in the future at this time   []   [x]       Missed Dose-  Denies    []   [x]       Extra Dose- denies   []   [x]       Change in medications started taking iron and stool softener---> no changes   []   [x]       Change in health/diet/appetite denies changes, no NVD    []   [x]       Change in alcohol use  []   [x]       Change in activity  []   [x]       Hospital admission  []   [x]       Emergency department visit  []   [x]       Other complaints     Clinical Outcomes:  Yes     No  []   [x]       Major bleeding event  []   [x]       Thromboembolic event    Duration of warfarin Therapy: indefinitely  INR Range:  2.0-3.0     Daughter accompanied patient today- doesn't do medications, not sure of dosing since has caretaker. INR is 2.9 today again   Continue weekly dose of 10mg on Mon and 7.5 mg all other days of the week. Encouraged to maintain a consistency of vegetables/salads.    Recheck INR in 6 weeks, 1/14    Referring is Dr. Heidy Paz   INR (no units)   Date Value   12/03/2020 2.9   10/29/2020 2.2   09/30/2020 2.8   09/01/2020 2.9   05/28/2020 2.82 (H)   05/14/2020 1.40 (H)   04/30/2020 1.03   03/27/2020 1.99 (H)     CLINICAL PHARMACY CONSULT: MED RECONCILIATION/REVIEW ADDENDUM    For Pharmacy Admin Tracking Only    PHSO: Yes  Total # of Interventions Recommended: 0  - Maintenance Safety Lab Monitoring #: 1  Total Interventions Accepted: 0  Time Spent (min): 15    Serena ColonD

## 2020-12-03 NOTE — TELEPHONE ENCOUNTER
Patient given Urine Culture results and will  RX
noted
Acceptable eye movement; lids with acceptable appearance and movement; conjunctiva clear; iris acceptable shape and color; cornea clear; pupils equally round and react to light. Pupil red reflexes present and equal.

## 2021-01-14 ENCOUNTER — ANTI-COAG VISIT (OUTPATIENT)
Dept: PHARMACY | Age: 86
End: 2021-01-14
Payer: MEDICARE

## 2021-01-14 VITALS — TEMPERATURE: 97.5 F

## 2021-01-14 DIAGNOSIS — Z86.711 HISTORY OF PULMONARY EMBOLISM: ICD-10-CM

## 2021-01-14 LAB — INTERNATIONAL NORMALIZATION RATIO, POC: 2.1

## 2021-01-14 PROCEDURE — 85610 PROTHROMBIN TIME: CPT

## 2021-01-14 PROCEDURE — 99211 OFF/OP EST MAY X REQ PHY/QHP: CPT

## 2021-01-14 NOTE — PROGRESS NOTES
Ms. Suman Flower is a 80 y.o. 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 Took Xarelto for the 3 years after her first PE. Then developed \"several small clots\" as it was described. She was then switched to warfarin 5mg as of 12/3/18. Pertinent PMH: HTN, Hx of PE,     Patient Reported Findings:  Yes     No  [x]   []       Patient & son verifies current dosing regimen as listed home nurse that takes care of medications    Daughter accompanied patient today- doesn't do medications, not sure of dosing since has caretaker. She will check when she gets home to confirm dose. --> pt confirms dose   []   [x]       S/S bleeding/bruising/swelling/SOB- denies   []   [x]       Blood in urine or stool- denies   []   [x]       Procedures scheduled in the future at this time   []   [x]       Missed Dose-  Denies    []   [x]       Extra Dose- denies   []   [x]       Change in medications started taking iron and stool softener---> no changes   []   [x]       Change in health/diet/appetite denies changes, no NVD    []   [x]       Change in alcohol use  []   [x]       Change in activity  []   [x]       Hospital admission  []   [x]       Emergency department visit  []   [x]       Other complaints     Clinical Outcomes:  Yes     No  []   [x]       Major bleeding event  []   [x]       Thromboembolic event    Duration of warfarin Therapy: indefinitely  INR Range:  2.0-3.0     Daughter accompanied patient today- doesn't do medications, not sure of dosing since has caretaker. INR is 2.1 today  Continue weekly dose of 10mg on Mon and 7.5 mg all other days of the week. Encouraged to maintain a consistency of vegetables/salads.    Recheck INR in 6 weeks, 2/25    Referring is Dr. Rajesh Cuadra   INR (no units)   Date Value   12/03/2020 2.9   10/29/2020 2.2   09/30/2020 2.8   09/01/2020 2.9   05/28/2020 2.82 (H)   05/14/2020 1.40 (H)   04/30/2020 1.03 03/27/2020 1.99 (H)     CLINICAL PHARMACY CONSULT: MED RECONCILIATION/REVIEW ADDENDUM    For Pharmacy Admin Tracking Only    PHSO: Yes  Total # of Interventions Recommended: 0  - Maintenance Safety Lab Monitoring #: 1  Total Interventions Accepted: 0  Time Spent (min): 15    Kiki Gregg PharmD

## 2021-01-21 DIAGNOSIS — M41.25 OTHER IDIOPATHIC SCOLIOSIS, THORACOLUMBAR REGION: ICD-10-CM

## 2021-01-21 RX ORDER — TRAMADOL HYDROCHLORIDE 50 MG/1
TABLET ORAL
Qty: 90 TABLET | Refills: 0 | Status: SHIPPED | OUTPATIENT
Start: 2021-01-21 | End: 2021-04-20

## 2021-02-25 ENCOUNTER — ANTI-COAG VISIT (OUTPATIENT)
Dept: PHARMACY | Age: 86
End: 2021-02-25
Payer: MEDICARE

## 2021-02-25 VITALS — TEMPERATURE: 97.3 F

## 2021-02-25 DIAGNOSIS — Z86.711 HISTORY OF PULMONARY EMBOLISM: ICD-10-CM

## 2021-02-25 LAB — INTERNATIONAL NORMALIZATION RATIO, POC: 1.5

## 2021-02-25 PROCEDURE — 99211 OFF/OP EST MAY X REQ PHY/QHP: CPT

## 2021-02-25 PROCEDURE — 85610 PROTHROMBIN TIME: CPT

## 2021-02-25 NOTE — PROGRESS NOTES
OPP.  Recheck INR in 4 weeks, 3/25 - patient can go back to 6 weeks if therapeutic at next appointment.      Referring is Dr. Colleen Christian   INR (no units)   Date Value   02/25/2021 1.5   01/14/2021 2.1   12/03/2020 2.9   10/29/2020 2.2   05/28/2020 2.82 (H)   05/14/2020 1.40 (H)   04/30/2020 1.03   03/27/2020 1.99 (H)       CLINICAL PHARMACY CONSULT: MED RECONCILIATION/REVIEW ADDENDUM    For Pharmacy Admin Tracking Only    PHSO: Yes  Total # of Interventions Recommended: 2  - Increased Dose #: 1  - Refills Provided #: 1  - Maintenance Safety Lab Monitoring #: 1  Total Interventions Accepted: 2  Time Spent (min): 15    Carolina Sol, SerenaD

## 2021-03-25 ENCOUNTER — ANTI-COAG VISIT (OUTPATIENT)
Dept: PHARMACY | Age: 86
End: 2021-03-25
Payer: MEDICARE

## 2021-03-25 DIAGNOSIS — Z86.711 HISTORY OF PULMONARY EMBOLISM: ICD-10-CM

## 2021-03-25 LAB — INTERNATIONAL NORMALIZATION RATIO, POC: 1.8

## 2021-03-25 PROCEDURE — 85610 PROTHROMBIN TIME: CPT

## 2021-03-25 PROCEDURE — 99211 OFF/OP EST MAY X REQ PHY/QHP: CPT

## 2021-03-25 NOTE — PROGRESS NOTES
Ms. Yolanda Iglesias is a 80 y.o. 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 Took Xarelto for the 3 years after her first PE. Then developed \"several small clots\" as it was described. She was then switched to warfarin 5mg as of 12/3/18. Pertinent PMH: HTN, Hx of PE,     Patient Reported Findings:  Yes     No  [x]   []       Patient & son verifies current dosing regimen as listed home nurse that takes care of medications    Daughter accompanied patient today- doesn't do medications, not sure of dosing since has caretaker. She will check when she gets home to confirm dose. --> pt confirms dose    []   [x]       S/S bleeding/bruising/swelling/SOB- denies   []   [x]       Blood in urine or stool- denies   []   [x]       Procedures scheduled in the future at this time   [x]   []       Missed Dose-  Missed some doses over the last week per son, unsure how many and which days. -->  Has missed a few doses in the past month, most recently last night and once last week    []   [x]       Extra Dose- Denies   []   [x]       Change in medications started taking iron and stool softener---> no changes   []   [x]       Change in health/diet/appetite denies changes, no NVD   []   [x]       Change in alcohol use  []   [x]       Change in activity  []   [x]       Hospital admission  []   [x]       Emergency department visit  []   [x]       Other complaints     Clinical Outcomes:  Yes     No  []   [x]       Major bleeding event  []   [x]       Thromboembolic event    Duration of warfarin Therapy: indefinitely  INR Range:  2.0-3.0     Presents with son today. INR is 1.8 today after missed doses again   Patient normally stable on this dose when she gets all her doses. Take 10mg tonight and tomorrow, then continue weekly dose of 10mg on Mon and 7.5 mg all other days of the week. Encouraged to maintain a consistency of vegetables/salads.    Recheck INR in 4 weeks, 4/22 - patient can go back to 6 weeks if therapeutic at next appointment.      Referring is Dr. Stephanie Ramirez   INR (no units)   Date Value   02/25/2021 1.5   01/14/2021 2.1   12/03/2020 2.9   10/29/2020 2.2   05/28/2020 2.82 (H)   05/14/2020 1.40 (H)   04/30/2020 1.03   03/27/2020 1.99 (H)       CLINICAL PHARMACY CONSULT: MED RECONCILIATION/REVIEW ADDENDUM    For Pharmacy Admin Tracking Only    PHSO: Yes  Total # of Interventions Recommended: 1  - Increased Dose #: 1  - Maintenance Safety Lab Monitoring #: 1  Total Interventions Accepted: 1  Time Spent (min): 15    Serena MartinD

## 2021-03-29 ENCOUNTER — PATIENT MESSAGE (OUTPATIENT)
Dept: CARDIOLOGY CLINIC | Age: 86
End: 2021-03-29

## 2021-03-30 RX ORDER — FERROUS SULFATE 325(65) MG
325 TABLET ORAL 2 TIMES DAILY
Qty: 180 TABLET | Refills: 3 | Status: SHIPPED | OUTPATIENT
Start: 2021-03-30 | End: 2022-01-26

## 2021-03-30 NOTE — TELEPHONE ENCOUNTER
Last office visit 09/30/2020  Next office visit 04/02/2021  Please advise on medication refill pended

## 2021-03-30 NOTE — TELEPHONE ENCOUNTER
From: Darrin Porter  To: Elena Dumont MD  Sent: 3/29/2021 7:26 PM EDT  Subject: Prescription Question    Dr. Swetha Ledezma,  34, needs her ferrous sulfate prescription refilled. She's completely out. Thanks.     Any Colon

## 2021-04-20 DIAGNOSIS — M41.25 OTHER IDIOPATHIC SCOLIOSIS, THORACOLUMBAR REGION: ICD-10-CM

## 2021-04-20 RX ORDER — TRAMADOL HYDROCHLORIDE 50 MG/1
TABLET ORAL
Qty: 90 TABLET | Refills: 0 | Status: SHIPPED | OUTPATIENT
Start: 2021-04-20 | End: 2021-05-20

## 2021-04-21 NOTE — PROGRESS NOTES
CERTIFICATE OF RETURN TO WORK    October 7, 2019      Re:   Shayy Manley  [address]  [facility] [address]                        This is to certify that Shayy Manley has been under my care from 10/4/2019 and can return to regular WORK on 10/8/2019 after 5 PM    RESTRICTIONS: none      REMARKS: none        SIGNATURE:___________________________________________,   10/7/2019      [facility]  [facility]  [address] [facility] [id number] [phone]   TAKE 1 TABLET BY MOUTH EVERY 8 HOURS AS NEEDED FOR PAIN FOR UP TO 30 DAYS. 4/20/21 5/20/21  Brendan Gerard MD   ferrous sulfate (IRON 325) 325 (65 Fe) MG tablet Take 1 tablet by mouth 2 times daily 3/30/21   David Elizondo MD   furosemide (LASIX) 20 MG tablet Take one tablet as needed for shortness of breath or swelling. 9/30/20   David Elizondo MD   lisinopril (PRINIVIL;ZESTRIL) 2.5 MG tablet TAKE 1 TABLET BY MOUTH EVERY DAY 8/24/20   Brendan Gerard MD   levothyroxine (SYNTHROID) 75 MCG tablet TAKE 1 TABLET BY MOUTH EVERY DAY 8/24/20   Brendan Gerard MD   Riociguat (ADEMPAS) 2.5 MG TABS Take 2.5 mg by mouth 3 times daily 6/23/20   David Elizondo MD   OXYGEN Inhale 2 L into the lungs continuous    Historical Provider, MD   warfarin (COUMADIN) 5 MG tablet Hold for  INR greater  Than 3.0  Patient taking differently: Hold for  INR greater  Than 3.0  Managed by [facility] 12/5/18   Doron Michel MD   omeprazole (PRILOSEC) 20 MG delayed release capsule Take 20 mg by mouth daily    Historical Provider, MD   docusate sodium (COLACE) 100 MG capsule Take 1 capsule by mouth daily as needed for Constipation 7/13/17   Priscille Dakins, MD   Multiple Vitamins-Minerals (THERAPEUTIC MULTIVITAMIN-MINERALS) tablet Take 1 tablet by mouth daily    Historical Provider, MD   Ascorbic Acid (VITAMIN C) 500 MG tablet Take 500 mg by mouth daily    Historical Provider, MD        Allergies:  Naprosyn [naproxen]     ROS:   Review of Systems   Constitutional: Negative. Respiratory: Negative. Cardiovascular: Negative. Gastrointestinal: Negative. Genitourinary: Negative. Musculoskeletal: Negative. Skin: Negative. Neurological: Negative. Hematological: Negative. Psychiatric/Behavioral: Negative.         Physical Examination:    Vitals:    04/22/21 1005   BP: 130/60   Site: Left Upper Arm   Position: Sitting   Cuff Size: Medium Adult   Pulse: 83   SpO2: 93%   Weight: 140 lb (63.5 kg)   Height: 5' 1\" (1.549 m) Physical Exam  Vitals signs reviewed. Constitutional:       Appearance: Normal appearance. She is normal weight. HENT:      Head: Normocephalic and atraumatic. Eyes:      Extraocular Movements: Extraocular movements intact. Pupils: Pupils are equal, round, and reactive to light. Neck:      Musculoskeletal: Normal range of motion and neck supple. Cardiovascular:      Rate and Rhythm: Normal rate and regular rhythm. Pulses: Normal pulses. Heart sounds: Murmur present. Pulmonary:      Effort: Pulmonary effort is normal.      Breath sounds: Normal breath sounds. Abdominal:      Palpations: Abdomen is soft. Musculoskeletal:      Right lower leg: Edema present. Left lower leg: Edema present. Comments: L>R   Skin:     General: Skin is warm and dry. Neurological:      General: No focal deficit present. Mental Status: She is alert and oriented to person, place, and time. Mental status is at baseline. Psychiatric:         Mood and Affect: Mood normal.         Behavior: Behavior normal.         Thought Content:  Thought content normal.         Judgment: Judgment normal.         Lab Data:    CBC:   Lab Results   Component Value Date    WBC 6.8 09/30/2020    WBC 6.1 05/14/2020    WBC 8.0 04/30/2020    RBC 4.95 09/30/2020    RBC 5.43 05/14/2020    RBC 5.16 04/30/2020    HGB 14.6 09/30/2020    HGB 14.0 05/14/2020    HGB 12.8 04/30/2020    HCT 44.7 09/30/2020    HCT 44.0 05/14/2020    HCT 41.2 04/30/2020    MCV 90.3 09/30/2020    MCV 81.1 05/14/2020    MCV 79.8 04/30/2020    RDW 16.3 09/30/2020    RDW 28.7 05/14/2020    RDW 31.1 04/30/2020     09/30/2020     05/14/2020     04/30/2020     BMP:  Lab Results   Component Value Date     09/30/2020     04/16/2020     03/27/2020    K 4.8 09/30/2020    K 3.8 04/16/2020    K 4.3 03/27/2020    K 3.9 12/05/2018    K 4.0 12/04/2018    K 3.1 12/03/2018     09/30/2020     04/16/2020    CL 101 03/27/2020    CO2 29 09/30/2020    CO2 29 04/16/2020    CO2 28 03/27/2020    BUN 19 09/30/2020    BUN 14 04/16/2020    BUN 22 03/27/2020    CREATININE 0.7 09/30/2020    CREATININE 0.9 04/16/2020    CREATININE 0.9 03/27/2020     BNP:   Lab Results   Component Value Date    PROBNP 92 09/30/2020    PROBNP 78 04/16/2020    PROBNP 150 03/27/2020     Iron Studies:  No components found for: FE,  TIBC,  FERRITIN      Assessment/Plan:    1. PAH (pulmonary artery hypertension) (HCC) - stable, continue adempas, lasix, labs today, repeat echo with next OV   2. Essential hypertension - controlled on aCE   3. Nonrheumatic aortic valve stenosis - echo due       Instructions:   1. Medications: continue current medications  2. Labs: today  3. Referrals: echo   4.  Follow up: 6months with Dr. Carlton Leary        I appreciate the opportunity of cooperating in the care of this individual.    Jonelle Aparicio CNP, 4/21/2021,9:16 AM

## 2021-04-22 ENCOUNTER — ANTI-COAG VISIT (OUTPATIENT)
Dept: PHARMACY | Age: 86
End: 2021-04-22
Payer: MEDICARE

## 2021-04-22 ENCOUNTER — OFFICE VISIT (OUTPATIENT)
Dept: CARDIOLOGY CLINIC | Age: 86
End: 2021-04-22
Payer: MEDICARE

## 2021-04-22 ENCOUNTER — HOSPITAL ENCOUNTER (OUTPATIENT)
Age: 86
Discharge: HOME | End: 2021-04-22
Payer: MEDICARE

## 2021-04-22 VITALS
OXYGEN SATURATION: 93 % | SYSTOLIC BLOOD PRESSURE: 130 MMHG | BODY MASS INDEX: 26.43 KG/M2 | HEART RATE: 83 BPM | WEIGHT: 140 LBS | HEIGHT: 61 IN | DIASTOLIC BLOOD PRESSURE: 60 MMHG

## 2021-04-22 DIAGNOSIS — I27.21 PAH (PULMONARY ARTERY HYPERTENSION) (HCC): ICD-10-CM

## 2021-04-22 DIAGNOSIS — I27.21 PAH (PULMONARY ARTERY HYPERTENSION) (HCC): Primary | ICD-10-CM

## 2021-04-22 DIAGNOSIS — I10 ESSENTIAL HYPERTENSION: Chronic | ICD-10-CM

## 2021-04-22 DIAGNOSIS — I35.0 NONRHEUMATIC AORTIC VALVE STENOSIS: ICD-10-CM

## 2021-04-22 DIAGNOSIS — Z86.711 HISTORY OF PULMONARY EMBOLISM: ICD-10-CM

## 2021-04-22 LAB
ANION GAP SERPL CALCULATED.3IONS-SCNC: 10 MMOL/L (ref 3–16)
BUN BLDV-MCNC: 21 MG/DL (ref 7–20)
CALCIUM SERPL-MCNC: 9.5 MG/DL (ref 8.3–10.6)
CHLORIDE BLD-SCNC: 103 MMOL/L (ref 99–110)
CO2: 28 MMOL/L (ref 21–32)
CREAT SERPL-MCNC: 0.8 MG/DL (ref 0.6–1.2)
GFR AFRICAN AMERICAN: >60
GFR NON-AFRICAN AMERICAN: >60
GLUCOSE BLD-MCNC: 86 MG/DL (ref 70–99)
HCT VFR BLD CALC: 43.2 % (ref 36–48)
HEMOGLOBIN: 13.9 G/DL (ref 12–16)
INTERNATIONAL NORMALIZATION RATIO, POC: 2.2
MCH RBC QN AUTO: 28.4 PG (ref 26–34)
MCHC RBC AUTO-ENTMCNC: 32.2 G/DL (ref 31–36)
MCV RBC AUTO: 87.9 FL (ref 80–100)
PDW BLD-RTO: 15.4 % (ref 12.4–15.4)
PLATELET # BLD: 301 K/UL (ref 135–450)
PMV BLD AUTO: 9.5 FL (ref 5–10.5)
POTASSIUM SERPL-SCNC: 4.9 MMOL/L (ref 3.5–5.1)
PRO-BNP: 105 PG/ML (ref 0–449)
RBC # BLD: 4.91 M/UL (ref 4–5.2)
SODIUM BLD-SCNC: 141 MMOL/L (ref 136–145)
WBC # BLD: 11.5 K/UL (ref 4–11)

## 2021-04-22 PROCEDURE — 85610 PROTHROMBIN TIME: CPT

## 2021-04-22 PROCEDURE — 36415 COLL VENOUS BLD VENIPUNCTURE: CPT

## 2021-04-22 PROCEDURE — 85027 COMPLETE CBC AUTOMATED: CPT

## 2021-04-22 PROCEDURE — 99211 OFF/OP EST MAY X REQ PHY/QHP: CPT

## 2021-04-22 PROCEDURE — 83880 ASSAY OF NATRIURETIC PEPTIDE: CPT

## 2021-04-22 PROCEDURE — 80048 BASIC METABOLIC PNL TOTAL CA: CPT

## 2021-04-22 PROCEDURE — 99214 OFFICE O/P EST MOD 30 MIN: CPT | Performed by: NURSE PRACTITIONER

## 2021-04-22 ASSESSMENT — ENCOUNTER SYMPTOMS
GASTROINTESTINAL NEGATIVE: 1
RESPIRATORY NEGATIVE: 1

## 2021-04-22 NOTE — PATIENT INSTRUCTIONS
Instructions:   1. Medications: continue current medications  2. Labs: today  3. Referrals: echo   4.  Follow up: 6months with Dr. Gee Kiran

## 2021-04-22 NOTE — PROGRESS NOTES
Ms. Michelet Albrecht is a 80 y.o. 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 Took Xarelto for the 3 years after her first PE. Then developed \"several small clots\" as it was described. She was then switched to warfarin 5mg as of 12/3/18. Pertinent PMH: HTN, Hx of PE,     Patient Reported Findings:  Yes     No  [x]   []       Patient & son verifies current dosing regimen as listed home nurse that takes care of medications    Daughter accompanied patient today- doesn't do medications, not sure of dosing since has caretaker. She will check when she gets home to confirm dose. --> pt confirms dose    []   [x]       S/S bleeding/bruising/swelling/SOB- denies   []   [x]       Blood in urine or stool- denies   []   [x]       Procedures scheduled in the future at this time   [x]   []       Missed Dose-  Missed some doses over the last week per son, unsure how many and which days. -->  Has missed a few doses in the past month, most recently last night and once last week ---> missed yesterday    []   [x]       Extra Dose- Denies   []   [x]       Change in medications started taking iron and stool softener---> no changes   []   [x]       Change in health/diet/appetite denies changes, no NVD   []   [x]       Change in alcohol use  []   [x]       Change in activity  []   [x]       Hospital admission  []   [x]       Emergency department visit  []   [x]       Other complaints     Clinical Outcomes:  Yes     No  []   [x]       Major bleeding event  []   [x]       Thromboembolic event    Duration of warfarin Therapy: indefinitely  INR Range:  2.0-3.0     Presents with son today. INR is 2.2 today   Missed yesterday's dose. Take 10mg tonight then continue weekly dose of 10mg on Mon and 7.5 mg all other days of the week. Encouraged to maintain a consistency of vegetables/salads.    Recheck INR in 5 weeks, 5/27 - patient can go back to 6 weeks if therapeutic at next appointment.      Referring is Dr. Ashwini Murphy   INR (no units)   Date Value   04/22/2021 2.2   03/25/2021 1.8   02/25/2021 1.5   01/14/2021 2.1   05/28/2020 2.82 (H)   05/14/2020 1.40 (H)   04/30/2020 1.03   03/27/2020 1.99 (H)       CLINICAL PHARMACY CONSULT: MED RECONCILIATION/REVIEW ADDENDUM    For Pharmacy Admin Tracking Only    PHSO: Yes  Total # of Interventions Recommended: 1  - Increased Dose #: 1  - Maintenance Safety Lab Monitoring #: 1  Total Interventions Accepted: 1  Time Spent (min): [address] PharmD

## 2021-04-23 ENCOUNTER — TELEPHONE (OUTPATIENT)
Dept: CARDIOLOGY CLINIC | Age: 86
End: 2021-04-23

## 2021-04-23 NOTE — TELEPHONE ENCOUNTER
----- Message from TAVON Gonzalez CNP sent at 4/23/2021  9:10 AM EDT -----  Labs look great, no new orders.  Job Huerta

## 2021-05-10 ENCOUNTER — NURSE TRIAGE (OUTPATIENT)
Dept: OTHER | Facility: CLINIC | Age: 86
End: 2021-05-10

## 2021-05-10 NOTE — TELEPHONE ENCOUNTER
Received call from Tiffanie Flanagan at [facility]) Phyllis with Red Flag Complaint. Brief description of triage:Son called with patient, states that patient has had generalized weakness and has fallen twice in the last 24 hours. Triage indicates for patient to see PCP within the next 24 hours, son states that he will call and make an appointment in the morning. Care advice provided, patient verbalizes understanding; denies any other questions or concerns; instructed to call back for any new or worsening symptoms. Attention Provider: Thank you for allowing me to participate in the care of your patient. The patient was connected to triage in response to information provided to the [facility]. Please do not respond through this encounter as the response is not directed to a shared pool. Reason for Disposition   [1] MODERATE weakness (i.e., interferes with work, school, normal activities) AND [2] persists > 3 days    Answer Assessment - Initial Assessment Questions  1. DESCRIPTION: \"Describe how you are feeling. \"     Normal    2. SEVERITY: \"How bad is it? \"  \"Can you stand and walk? \"    - MILD - Feels weak or tired, but does not interfere with work, school or normal activities    - [facility] to stand and walk; weakness interferes with work, school, or normal activities    - SEVERE - Unable to stand or walk      Uses a walker to walk    3. ONSET:  \"When did the weakness begin? \"      Son states that it has been an ongoing issue    4. CAUSE: \"What do you think is causing the weakness? \"      Son states that she has spinal stenosis and her age he feels is also a factor    5. MEDICINES: Jewell Knife you recently started a new medicine or had a change in the amount of a medicine? \"      No    6. OTHER SYMPTOMS: \"Do you have any other symptoms? \" (e.g., chest pain, fever, cough, SOB, vomiting, diarrhea, bleeding, other areas of pain)     No    7. PREGNANCY: \"Is there any chance you are pregnant? \" \"When was your last menstrual period? \"      No    Protocols used: WEAKNESS (GENERALIZED) AND FATIGUE-ADULT-AH

## 2021-05-11 ENCOUNTER — OFFICE VISIT (OUTPATIENT)
Dept: FAMILY MEDICINE CLINIC | Age: 86
End: 2021-05-11
Payer: MEDICARE

## 2021-05-11 VITALS — SYSTOLIC BLOOD PRESSURE: 120 MMHG | DIASTOLIC BLOOD PRESSURE: 78 MMHG | HEART RATE: 76 BPM | OXYGEN SATURATION: 98 %

## 2021-05-11 DIAGNOSIS — N30.01 ACUTE CYSTITIS WITH HEMATURIA: ICD-10-CM
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
## 2021-05-11 DIAGNOSIS — R39.9 UTI SYMPTOMS: ICD-10-CM

## 2021-05-11 DIAGNOSIS — R60.0 LOWER EXTREMITY EDEMA: Primary | ICD-10-CM
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
## 2021-05-11 LAB
BILIRUBIN, POC: NORMAL
BLOOD URINE, POC: NORMAL
CLARITY, POC: NORMAL
COLOR, POC: YELLOW
GLUCOSE URINE, POC: NORMAL
KETONES, POC: NORMAL
LEUKOCYTE EST, POC: NORMAL
NITRITE, POC: POSITIVE
PH, POC: 5.5
PROTEIN, POC: NORMAL
SPECIFIC GRAVITY, POC: 1.02
UROBILINOGEN, POC: 0.2

## 2021-05-11 PROCEDURE — 81002 URINALYSIS NONAUTO W/O SCOPE: CPT | Performed by: FAMILY MEDICINE

## 2021-05-11 PROCEDURE — 99214 OFFICE O/P EST MOD 30 MIN: CPT | Performed by: FAMILY MEDICINE

## 2021-05-11 RX ORDER — FUROSEMIDE 20 MG/1
TABLET ORAL
Qty: 30 TABLET | Refills: 11 | Status: SHIPPED | OUTPATIENT
Start: 2021-05-11 | End: 2022-08-08 | Stop reason: SDUPTHER

## 2021-05-11 RX ORDER — NITROFURANTOIN 25; 75 MG/1; MG/1
100 CAPSULE ORAL 2 TIMES DAILY
Qty: 10 CAPSULE | Refills: 0 | Status: SHIPPED | OUTPATIENT
Start: 2021-05-11 | End: 2021-05-16

## 2021-05-11 NOTE — PROGRESS NOTES
[address] Note    Date: 5/11/2021                                               Subjective/Objective:     Chief Complaint   Patient presents with    Fatigue    Fall     2 times     Leg Swelling     Left        HPI   L leg swelling starting 3-4 weeks ago. Not painful. No skin changes. No SOB. No orthopnea. Has hx of PEs and pulmonary hypertension. Takes coumadin and supplental O2. Sees cardiology. Has been on Lasix in the past for swelling of the legs, but has been out of the medicine. Pt fell at home 2 days ago and then again last night. Denies any injury. No pain. Pt's on says she bumped her head on the floor. No HA. No LOC. She states that the last time she fell about a year ago, she found her she had UTI. Denies any urinary frequency or dysuria.          Patient Active Problem List    Diagnosis Date Noted    Neck mass     Arm mass, left     Acute encephalopathy     CTEPH (chronic thromboembolic pulmonary hypertension) ([address]     PAH (pulmonary artery hypertension) ([address]     Nonrheumatic aortic valve stenosis     Hypoxia 11/28/2018    Vertigo 06/05/2016    Generalized weakness 04/27/2016    Weakness 05/11/2015    Anemia 03/30/2015    History of pulmonary embolism     VTE (venous thromboembolism) 12/27/2014    Abnormal EKG 10/20/2014    Acute respiratory failure ([address] 08/24/2014    Weakness generalized 08/23/2014    Hypothyroidism     Hyperlipidemia     Hypertension     Scoliosis        Past Medical History:   Diagnosis Date    Arthritis     Hyperlipidemia     Hypertension     Hypothyroidism     Lumbar herniated disc     Movement disorder     scoliosis    PAH (pulmonary arterial hypertension) with portal hypertension (HCC)     Pulmonary emboli ([address] 12/2014    Scoliosis     Scoliosis     Thyroid disease     hypothyroid       Current Outpatient Medications   Medication Sig Dispense Refill    furosemide (LASIX) 20 MG tablet Take one tablet as needed every 12 hours for shortness of breath or swelling. 30 tablet 11    nitrofurantoin, macrocrystal-monohydrate, (MACROBID) 100 MG capsule Take 1 capsule by mouth 2 times daily for 5 days 10 capsule 0    traMADol (ULTRAM) 50 MG tablet TAKE 1 TABLET BY MOUTH EVERY 8 HOURS AS NEEDED FOR PAIN FOR UP TO 30 DAYS. 90 tablet 0    ferrous sulfate (IRON 325) 325 (65 Fe) MG tablet Take 1 tablet by mouth 2 times daily 180 tablet 3    lisinopril (PRINIVIL;ZESTRIL) 2.5 MG tablet TAKE 1 TABLET BY MOUTH EVERY DAY 90 tablet 3    levothyroxine (SYNTHROID) 75 MCG tablet TAKE 1 TABLET BY MOUTH EVERY DAY 90 tablet 3    Riociguat (ADEMPAS) 2.5 MG TABS Take 2.5 mg by mouth 3 times daily 90 tablet 11    OXYGEN Inhale 2 L into the lungs continuous      warfarin (COUMADIN) 5 MG tablet Hold for  INR greater  Than 3.0 (Patient taking differently: Hold for  INR greater  Than 3.0  Managed by [facility]) 15 tablet 0    omeprazole (PRILOSEC) 20 MG delayed release capsule Take 20 mg by mouth daily      docusate sodium (COLACE) 100 MG capsule Take 1 capsule by mouth daily as needed for Constipation 30 capsule 2    Multiple Vitamins-Minerals (THERAPEUTIC MULTIVITAMIN-MINERALS) tablet Take 1 tablet by mouth daily      Ascorbic Acid (VITAMIN C) 500 MG tablet Take 500 mg by mouth daily       No current facility-administered medications for this visit. Allergies   Allergen Reactions    Naprosyn [Naproxen] Nausea Only     ABDOMINAL PAIN       Review of Systems   No fever, no vomiting    Vitals:  /78   Pulse 76   SpO2 98%     Physical Exam   General:  Well-appearing, NAD, alert, non-toxic  HEENT:  Normocephalic, atraumatic. Pupils equal and round. CHEST/LUNGS: CTAB, no crackles, no wheeze, no rhonchi.  Symmetric rise  CARDIOVASCULAR: RRR,  no murmur, no rub  EXTREMETIES: Normal movement of all extremities  SKIN:  No rash, no cellulitis, no bruising, no petechiae/purpura/vesicles/pustules/abscess  PSYCH:  A+O x 3; normal

## 2021-05-11 NOTE — PATIENT INSTRUCTIONS
Patient Education        Urinary Tract Infection (UTI) in Women: Care Instructions  Overview     A urinary tract infection, or UTI, is a general term for an infection anywhere between the kidneys and the urethra (where urine comes out). Most UTIs are bladder infections. They often cause pain or burning when you urinate. UTIs are caused by bacteria and can be cured with antibiotics. Be sure to complete your treatment so that the infection does not get worse. Follow-up care is a key part of your treatment and safety. Be sure to make and go to all appointments, and call your doctor if you are having problems. It's also a good idea to know your test results and keep a list of the medicines you take. How can you care for yourself at home? · Take your antibiotics as directed. Do not stop taking them just because you feel better. You need to take the full course of antibiotics. · Drink extra water and other fluids for the next day or two. This will help make the urine less concentrated and help wash out the bacteria that are causing the infection. (If you have kidney, heart, or liver disease and have to limit fluids, talk with your doctor before you increase the amount of fluids you drink.)  · Avoid drinks that are carbonated or have caffeine. They can irritate the bladder. · Urinate often. Try to empty your bladder each time. · To relieve pain, take a hot bath or lay a heating pad set on low over your lower belly or genital area. Never go to sleep with a heating pad in place. To prevent UTIs  · Drink plenty of water each day. This helps you urinate often, which clears bacteria from your system. (If you have kidney, heart, or liver disease and have to limit fluids, talk with your doctor before you increase the amount of fluids you drink.)  · Urinate when you need to. · If you are sexually active, urinate right after you have sex. · Change sanitary pads often.   · Avoid douches, bubble baths, feminine hygiene sprays, and other feminine hygiene products that have deodorants. · After going to the bathroom, wipe from front to back. When should you call for help? Call your doctor now or seek immediate medical care if:    · Symptoms such as fever, chills, nausea, or vomiting get worse or appear for the first time.     · You have new pain in your back just below your rib cage. This is called flank pain.     · There is new blood or pus in your urine.     · You have any problems with your antibiotic medicine. Watch closely for changes in your health, and be sure to contact your doctor if:    · You are not getting better after taking an antibiotic for 2 days.     · Your symptoms go away but then come back. Where can you learn more? Go to [email or web address]. org and sign in to your [employer] account. Enter F779 in the [employer] box to learn more about \"Urinary Tract Infection (UTI) in Women: Care Instructions. \"     If you do not have an account, please click on the \"Sign Up Now\" link. Current as of: June 29, 2020               Content Version: 12.8  © 2428-2633 Healthwise, Incorporated. Care instructions adapted under license by [facility] ([facility]). If you have questions about a medical condition or this instruction, always ask your healthcare professional. [address] any warranty or liability for your use of this information.

## 2021-05-13 LAB
ORGANISM: ABNORMAL
URINE CULTURE, ROUTINE: ABNORMAL
URINE CULTURE, ROUTINE: ABNORMAL

## 2021-05-27 ENCOUNTER — ANTI-COAG VISIT (OUTPATIENT)
Dept: PHARMACY | Age: 86
End: 2021-05-27
Payer: MEDICARE

## 2021-05-27 DIAGNOSIS — Z86.711 HISTORY OF PULMONARY EMBOLISM: Primary | ICD-10-CM

## 2021-05-27 LAB — INTERNATIONAL NORMALIZATION RATIO, POC: 3

## 2021-05-27 PROCEDURE — 85610 PROTHROMBIN TIME: CPT

## 2021-05-27 PROCEDURE — 99211 OFF/OP EST MAY X REQ PHY/QHP: CPT

## 2021-05-27 NOTE — TELEPHONE ENCOUNTER
Warfarin prescription phoned into [facility] to [phone] under Dr. Ashwini Murphy  Warfarin 5 mg tabs  Take 10 mg on Mon and 7.5 mg all other days of the week  90 days   2 refills

## 2021-05-27 NOTE — PROGRESS NOTES
Ms. Levern Fleischer is a 80 y.o. 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 Took Xarelto for the 3 years after her first PE. Then developed \"several small clots\" as it was described. She was then switched to warfarin 5mg as of 12/3/18. Pertinent PMH: HTN, Hx of PE,     Patient Reported Findings:  Yes     No  [x]   []       Patient & son verifies current dosing regimen as listed home nurse that takes care of medications    Daughter accompanied patient today- doesn't do medications, not sure of dosing since has caretaker. She will check when she gets home to confirm dose. --> pt confirms dose    []   [x]       S/S bleeding/bruising/swelling/SOB- denies ---> fell 5/9, hit head, spoke with doctor because refused ER so went to doc and was assessed- fine  []   [x]       Blood in urine or stool- denies   []   [x]       Procedures scheduled in the future at this time   [x]   []       Missed Dose-  Missed some doses over the last week per son, unsure how many and which days. -->  Has missed a few doses in the past month, most recently last night and once last week ---> missed yesterday---> denies     []   [x]       Extra Dose- Denies   []   [x]       Change in medications started taking iron and stool softener---> no changes---> abx for UTI, doesn't know name, done with it for >1 week  []   [x]       Change in health/diet/appetite denies changes, no NVD   []   [x]       Change in alcohol use  []   [x]       Change in activity  []   [x]       Hospital admission  []   [x]       Emergency department visit  []   [x]       Other complaints     Clinical Outcomes:  Yes     No  []   [x]       Major bleeding event  []   [x]       Thromboembolic event    Duration of warfarin Therapy: indefinitely  INR Range:  2.0-3.0     Presents with daughter today. INR is 3.0 today   Continue weekly dose of 10mg on Mon and 7.5 mg all other days of the week.    Encouraged to maintain a consistency of vegetables/salads. RF at [facility].   Recheck INR in 6 weeks, 7/8    Referring is Dr. Terri Gan   INR (no units)   Date Value   05/27/2021 3.0   04/22/2021 2.2   03/25/2021 1.8   02/25/2021 1.5   05/28/2020 2.82 (H)   05/14/2020 1.40 (H)   04/30/2020 1.03   03/27/2020 1.99 (H)     For Pharmacy Admin Tracking Only     Intervention Detail: Refill(s) Provided   Total # of Interventions Recommended: 1   Total # of Interventions Accepted: 1   Time Spent (min): [id number] [facility], PharmD

## 2021-06-01 DIAGNOSIS — M41.25 OTHER IDIOPATHIC SCOLIOSIS, THORACOLUMBAR REGION: ICD-10-CM

## 2021-06-01 RX ORDER — TRAMADOL HYDROCHLORIDE 50 MG/1
TABLET ORAL
Qty: 90 TABLET | Refills: 0 | Status: SHIPPED | OUTPATIENT
Start: 2021-06-01 | End: 2021-09-08

## 2021-06-01 NOTE — TELEPHONE ENCOUNTER
Medication:   Requested Prescriptions     Pending Prescriptions Disp Refills    traMADol (ULTRAM) 50 MG tablet [Pharmacy Med Name: TRAMADOL HCL 50 MG TABLET] 90 tablet      Sig: TAKE 1 TABLET BY MOUTH EVERY 8 HOURS AS NEEDED FOR PAIN FOR UP TO 30 DAYS. Last Filled:  4/20/21 #90, 0 RF     Patient Phone Number: [phone] (home)     Last appt: 5/11/2021 fatigue, fall, leg swelling   Next appt: Visit date not found    Last OARRS:   RX Monitoring 4/20/2021   Attestation -   Periodic Controlled Substance Monitoring No signs of potential drug abuse or diversion identified.

## 2021-07-07 ENCOUNTER — TELEPHONE (OUTPATIENT)
Dept: CARDIOLOGY CLINIC | Age: 86
End: 2021-07-07

## 2021-07-07 RX ORDER — RIOCIGUAT 2.5 MG/1
2.5 TABLET, FILM COATED ORAL 3 TIMES DAILY
Qty: 90 TABLET | Refills: 11 | Status: SHIPPED | OUTPATIENT
Start: 2021-07-07 | End: 2022-07-13 | Stop reason: SDUPTHER

## 2021-07-07 NOTE — TELEPHONE ENCOUNTER
Medication Refill    Medication needing refilled:  adempas    Dosage of the medication:    How are you taking this medication (QD, BID, TID, QID, PRN):    30 or 90 day supply called in:    When will you run out of your medication:    Which Pharmacy are we sending the medication to?: [facility]

## 2021-07-08 ENCOUNTER — ANTI-COAG VISIT (OUTPATIENT)
Dept: PHARMACY | Age: 86
End: 2021-07-08
Payer: MEDICARE

## 2021-07-08 DIAGNOSIS — Z86.711 HISTORY OF PULMONARY EMBOLISM: Primary | ICD-10-CM

## 2021-07-08 LAB — INTERNATIONAL NORMALIZATION RATIO, POC: 1.8

## 2021-07-08 PROCEDURE — 99211 OFF/OP EST MAY X REQ PHY/QHP: CPT

## 2021-07-08 PROCEDURE — 85610 PROTHROMBIN TIME: CPT

## 2021-07-08 NOTE — PROGRESS NOTES
Ms. Paul Alfonso is a 80 y.o. 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 Took Xarelto for the 3 years after her first PE. Then developed \"several small clots\" as it was described. She was then switched to warfarin 5mg as of 12/3/18. Pertinent PMH: HTN, Hx of PE,     Patient Reported Findings:  Yes     No  [x]   []       Patient & son verifies current dosing regimen as listed home nurse that takes care of medications    Daughter accompanied patient today- doesn't do medications, not sure of dosing since has caretaker. She will check when she gets home to confirm dose. --> pt confirms dose    []   [x]       S/S bleeding/bruising/swelling/SOB- denies ---> fell 5/9, hit head, spoke with doctor because refused ER so went to doc and was assessed- fine---> denies   []   [x]       Blood in urine or stool- denies   []   [x]       Procedures scheduled in the future at this time   [x]   []       Missed Dose-  Missed some doses over the last week per son, unsure how many and which days. -->  Has missed a few doses in the past month, most recently last night and once last week ---> missed yesterday---> denies ---> missed Sunday     []   [x]       Extra Dose- Denies   []   [x]       Change in medications started taking iron and stool softener---> no changes---> abx for UTI, doesn't know name, done with it for >1 week  []   [x]       Change in health/diet/appetite denies changes, no NVD   []   [x]       Change in alcohol use  []   [x]       Change in activity  []   [x]       Hospital admission  []   [x]       Emergency department visit  []   [x]       Other complaints     Clinical Outcomes:  Yes     No  []   [x]       Major bleeding event  []   [x]       Thromboembolic event    Duration of warfarin Therapy: indefinitely  INR Range:  2.0-3.0     Presents with son today. INR is 1.8 today dt missed dose on Sunday.    Take 10mg tomorrow then continue weekly dose of 10mg on Mon and 7.5 mg all other days of the week. Encouraged to maintain a consistency of vegetables/salads.    Recheck INR in 6 weeks, 8/19    Referring is Dr. Bentley Garcia   INR (no units)   Date Value   07/08/2021 1.8   05/27/2021 3.0   04/22/2021 2.2   03/25/2021 1.8   05/28/2020 2.82 (H)   05/14/2020 1.40 (H)   04/30/2020 1.03   03/27/2020 1.99 (H)     For Pharmacy Admin Tracking Only     Intervention Detail: Dose Adjustment: 1, reason: Therapy Optimization   Total # of Interventions Recommended: 1   Total # of Interventions Accepted: 1   Time Spent (min): [address] [facility], PharmD

## 2021-07-20 ENCOUNTER — TELEPHONE (OUTPATIENT)
Dept: FAMILY MEDICINE CLINIC | Age: 86
End: 2021-07-20

## 2021-08-19 ENCOUNTER — ANTI-COAG VISIT (OUTPATIENT)
Dept: PHARMACY | Age: 86
End: 2021-08-19
Payer: MEDICARE

## 2021-08-19 DIAGNOSIS — Z86.711 HISTORY OF PULMONARY EMBOLISM: Primary | ICD-10-CM

## 2021-08-19 LAB — INTERNATIONAL NORMALIZATION RATIO, POC: 2.7

## 2021-08-19 PROCEDURE — 85610 PROTHROMBIN TIME: CPT

## 2021-08-19 PROCEDURE — 99211 OFF/OP EST MAY X REQ PHY/QHP: CPT

## 2021-08-24 ENCOUNTER — TELEPHONE (OUTPATIENT)
Dept: FAMILY MEDICINE CLINIC | Age: 86
End: 2021-08-24

## 2021-08-24 NOTE — TELEPHONE ENCOUNTER
----- Message from Donita Guy sent at 8/24/2021  2:57 PM EDT -----  Subject: Appointment Request    Reason for Call: Routine Medicare AWV    QUESTIONS  Type of Appointment? Established Patient  Reason for appointment request? Other - unable to schedule  Additional Information for Provider? Patient's daughter is calling to   schedule her mother's AWV. Please call Xidaughter back to schedule   this appointment at [phone]  ---------------------------------------------------------------------------  --------------  [address]  What is the best way for the office to contact you? OK to leave message on   voicemail  Preferred Call Back Phone Number? [phone]  ---------------------------------------------------------------------------  --------------  SCRIPT ANSWERS  Relationship to Patient? Other  Representative Name? Kiatlin-daughter  Additional information verified (besides Name and Date of Birth)? Phone   Number  (If the patient has Medicare as their primary insurance coverage ask this   question) Are you requesting a Medicare Annual Wellness Visit? Yes   Have you been diagnosed with, awaiting test results for, or told that you   are suspected of having COVID-19 (Coronavirus)? (If patient has tested   negative or was tested as a requirement for work, school, or travel and   not based on symptoms, answer no)? No  Do you currently have flu-like symptoms including fever or chills, cough,   shortness of breath, difficulty breathing, or new loss of taste or smell? No  Have you had close contact with someone with COVID-19 in the last 14 days? No  (Service Expert  click yes below to proceed with [employer] As Usual   Scheduling)?  Yes

## 2021-09-13 ENCOUNTER — OFFICE VISIT (OUTPATIENT)
Dept: FAMILY MEDICINE CLINIC | Age: 86
End: 2021-09-13
Payer: MEDICARE

## 2021-09-13 VITALS
HEART RATE: 104 BPM | BODY MASS INDEX: 27.38 KG/M2 | DIASTOLIC BLOOD PRESSURE: 76 MMHG | HEIGHT: 61 IN | OXYGEN SATURATION: 91 % | WEIGHT: 145 LBS | SYSTOLIC BLOOD PRESSURE: 128 MMHG

## 2021-09-13 DIAGNOSIS — E03.9 HYPOTHYROIDISM, UNSPECIFIED TYPE: ICD-10-CM

## 2021-09-13 DIAGNOSIS — Z00.00 ROUTINE GENERAL MEDICAL EXAMINATION AT A HEALTH CARE FACILITY: ICD-10-CM

## 2021-09-13 DIAGNOSIS — M41.25 OTHER IDIOPATHIC SCOLIOSIS, THORACOLUMBAR REGION: ICD-10-CM

## 2021-09-13 DIAGNOSIS — I27.24 CTEPH (CHRONIC THROMBOEMBOLIC PULMONARY HYPERTENSION) (HCC): ICD-10-CM

## 2021-09-13 DIAGNOSIS — Z00.00 MEDICARE ANNUAL WELLNESS VISIT, SUBSEQUENT: Primary | ICD-10-CM

## 2021-09-13 DIAGNOSIS — I27.20 PULMONARY HYPERTENSION (HCC): ICD-10-CM

## 2021-09-13 DIAGNOSIS — Z23 NEEDS FLU SHOT: ICD-10-CM

## 2021-09-13 PROCEDURE — G0439 PPPS, SUBSEQ VISIT: HCPCS | Performed by: FAMILY MEDICINE

## 2021-09-13 PROCEDURE — G0008 ADMIN INFLUENZA VIRUS VAC: HCPCS | Performed by: FAMILY MEDICINE

## 2021-09-13 PROCEDURE — 90694 VACC AIIV4 NO PRSRV 0.5ML IM: CPT | Performed by: FAMILY MEDICINE

## 2021-09-13 SDOH — ECONOMIC STABILITY: FOOD INSECURITY: WITHIN THE PAST 12 MONTHS, THE FOOD YOU BOUGHT JUST DIDN'T LAST AND YOU DIDN'T HAVE MONEY TO GET MORE.: NEVER TRUE

## 2021-09-13 SDOH — ECONOMIC STABILITY: FOOD INSECURITY: WITHIN THE PAST 12 MONTHS, YOU WORRIED THAT YOUR FOOD WOULD RUN OUT BEFORE YOU GOT MONEY TO BUY MORE.: NEVER TRUE

## 2021-09-13 ASSESSMENT — PATIENT HEALTH QUESTIONNAIRE - PHQ9
SUM OF ALL RESPONSES TO PHQ QUESTIONS 1-9: 0
1. LITTLE INTEREST OR PLEASURE IN DOING THINGS: 0
SUM OF ALL RESPONSES TO PHQ9 QUESTIONS 1 & 2: 0
2. FEELING DOWN, DEPRESSED OR HOPELESS: 0
SUM OF ALL RESPONSES TO PHQ QUESTIONS 1-9: 0
SUM OF ALL RESPONSES TO PHQ QUESTIONS 1-9: 0

## 2021-09-13 ASSESSMENT — SOCIAL DETERMINANTS OF HEALTH (SDOH): HOW HARD IS IT FOR YOU TO PAY FOR THE VERY BASICS LIKE FOOD, HOUSING, MEDICAL CARE, AND HEATING?: NOT HARD AT ALL

## 2021-09-13 ASSESSMENT — LIFESTYLE VARIABLES: HOW OFTEN DO YOU HAVE A DRINK CONTAINING ALCOHOL: 0

## 2021-09-13 NOTE — PATIENT INSTRUCTIONS
Personalized Preventive Plan for Danita Morrison - 9/13/2021  Medicare offers a range of preventive health benefits. Some of the tests and screenings are paid in full while other may be subject to a deductible, co-insurance, and/or copay. Some of these benefits include a comprehensive review of your medical history including lifestyle, illnesses that may run in your family, and various assessments and screenings as appropriate. After reviewing your medical record and screening and assessments performed today your provider may have ordered immunizations, labs, imaging, and/or referrals for you. A list of these orders (if applicable) as well as your Preventive Care list are included within your After Visit Summary for your review. Other Preventive Recommendations:    · A preventive eye exam performed by an eye specialist is recommended every 1-2 years to screen for glaucoma; cataracts, macular degeneration, and other eye disorders. · A preventive dental visit is recommended every 6 months. · Try to get at least 150 minutes of exercise per week or 10,000 steps per day on a pedometer . · Order or download the FREE \"Exercise & Physical Activity: Your Everyday Guide\" from [employer]. Call 1-518.501.5437 or search [employer] online. · You need 5430-0655 mg of calcium and 5903-2116 IU of vitamin D per day. It is possible to meet your calcium requirement with diet alone, but a vitamin D supplement is usually necessary to meet this goal.  · When exposed to the sun, use a sunscreen that protects against both UVA and UVB radiation with an SPF of 30 or greater. Reapply every 2 to 3 hours or after sweating, drying off with a towel, or swimming. · Always wear a seat belt when traveling in a car. Always wear a helmet when riding a bicycle or motorcycle.

## 2021-09-13 NOTE — PROGRESS NOTES
Medicare Annual Wellness Visit  Name: Enedina Lujan Date: 2021   MRN: [id number] Sex: Female   Age: 80 y.o. Ethnicity: Non- / Non    : 1934 Race: White (non-)      Liliana Dickinson is here for No chief complaint on file. Screenings for behavioral, psychosocial and functional/safety risks, and cognitive dysfunction are all negative except as indicated below. These results, as well as other patient data from the [address] form, are documented in Flowsheets linked to this Encounter. Allergies   Allergen Reactions    Naprosyn [Naproxen] Nausea Only     ABDOMINAL PAIN         Prior to Visit Medications    Medication Sig Taking? Authorizing Provider   levothyroxine (SYNTHROID) 75 MCG tablet TAKE 1 TABLET BY MOUTH EVERY DAY Yes Janette Luque MD   traMADol (ULTRAM) 50 MG tablet TAKE 1 TABLET BY MOUTH EVERY 8 HOURS AS NEEDED FOR PAIN FOR UP TO 30 DAYS. Yes Janette Luque MD   lisinopril (PRINIVIL;ZESTRIL) 2.5 MG tablet TAKE 1 TABLET BY MOUTH EVERY DAY Yes Janette Luque MD   Riociguat (ADEMPAS) 2.5 MG TABS Take 2.5 mg by mouth 3 times daily Yes Kathryn Mccormack MD   furosemide (LASIX) 20 MG tablet Take one tablet as needed every 12 hours for shortness of breath or swelling.  Yes Shivam Chester MD   ferrous sulfate (IRON 325) 325 (65 Fe) MG tablet Take 1 tablet by mouth 2 times daily Yes Kathryn Mccormack MD   OXYGEN Inhale 2 L into the lungs continuous Yes Historical Provider, MD   warfarin (COUMADIN) 5 MG tablet Hold for  INR greater  Than 3.0  Patient taking differently: Hold for  INR greater  Than 3.0  Managed by [facility] Yes Melissa Otero MD   omeprazole (PRILOSEC) 20 MG delayed release capsule Take 20 mg by mouth daily Yes Historical Provider, MD   docusate sodium (COLACE) 100 MG capsule Take 1 capsule by mouth daily as needed for Constipation Yes Shirlene Lan MD   Multiple Vitamins-Minerals (THERAPEUTIC MULTIVITAMIN-MINERALS) tablet Take 1 tablet by mouth daily Yes Historical Provider, MD   Ascorbic Acid (VITAMIN C) 500 MG tablet Take 500 mg by mouth daily Yes Historical Provider, MD         Past Medical History:   Diagnosis Date    Arthritis     Hyperlipidemia     Hypertension     Hypothyroidism     Lumbar herniated disc     Movement disorder     scoliosis    PAH (pulmonary arterial hypertension) with portal hypertension (HCC)     Pulmonary emboli ([facility] [address] 12/2014    Scoliosis     Scoliosis     Thyroid disease     hypothyroid       Past Surgical History:   Procedure Laterality Date    COLONOSCOPY  7/08    EXCISION / BIOPSY SKIN LESION OF ARM Left 9/18/2019    EXCISION LEFT FOREARM MASS performed by Tommy Austin MD at [address] [facility]      both knees    NECK SURGERY Right 9/18/2019    EXCISION OF RIGHT NECK MASS performed by Tommy Austin MD at [place] [address] History   Problem Relation Age of Onset    High Cholesterol Mother     High Cholesterol Father     Cancer Brother        CareTeam (Including outside providers/suppliers regularly involved in providing care):   Patient Care Team:  Sudheer Eisenberg MD as PCP - General (Family Medicine)  Sudheer Eisenberg MD as PCP - [facility] Empaneled Provider  Tommy Austin MD as Surgeon (General Surgery)    Wt Readings from Last 3 Encounters:   09/13/21 145 lb (65.8 kg)   04/22/21 140 lb (63.5 kg)   09/18/19 150 lb (68 kg)     Vitals:    09/13/21 1631   BP: 128/76   Pulse: 104   SpO2: 91%   Weight: 145 lb (65.8 kg)   Height: 5' 1\" (1.549 m)     Body mass index is 27.4 kg/m². Based upon direct observation of the patient, evaluation of cognition reveals mild recent memory issues. Vitals:    09/13/21 1631   BP: 128/76   Pulse: 104   SpO2: 91%   Weight: 145 lb (65.8 kg)   Height: 5' 1\" (1.549 m)     Body mass index is 27.4 kg/m².    General Appearance: alert and oriented, in no acute distress  Skin: warm and dry, no rash or erythema  Head: normocephalic and atraumatic  Eyes: pupils equal, round, and reactive to light, extraocular eye movements intact, conjunctivae normal  ENT: tympanic membrane, external ear and ear canal normal bilaterally, nose without deformity,   Neck: supple and non-tender without mass, no thyromegaly or thyroid nodules, no cervical lymphadenopathy  Pulmonary/Chest: clear to auscultation bilaterally- no wheezes, rales or rhonchi, normal air movement, no respiratory distress  Cardiovascular: normal rate, regular rhythm, normal S1 and S2, no rubs, clicks, or gallops, grade 2/6 systolic murmur,no carotid bruits  Abdomen: soft, non-tender, non-distended, normal bowel sounds, no masses or organomegaly  Extremities: no cyanosis, clubbing or edema  Neurologic: reflexes normal and symmetric, no cranial nerve deficit, speech normal  GYN:Rectal: deferred to Gynecologist  Breast: deferred to Gynecologist     Patient's complete Health Risk Assessment and screening values have been reviewed and are found in Flowsheets. The following problems were reviewed today and where indicated follow up appointments were made and/or referrals ordered. Positive Risk Factor Screenings with Interventions:            General Health and ACP:  General  In general, how would you say your health is?: Fair  In the past 7 days, have you experienced any of the following?  New or Increased Pain, New or Increased Fatigue, Loneliness, Social Isolation, Stress or Anger?: None of These  Do you get the social and emotional support that you need?: Yes  Do you have a Living Will?: Yes  Advance Directives     Power of [employer] Will ACP-Advance Directive ACP-Power of     Not on File Not on File Not on File Not on File      General Health Risk Interventions:  · no new issues    Health Habits/Nutrition:  Health Habits/Nutrition  Do you exercise for at least 20 minutes 2-3 times per week?: (!) No  Have you lost any weight without trying in the past 3 months?: No  Do you eat only one meal per day?: No  Have you seen the dentist within the past year?: (!) No  Body mass index: (!) 27.39     Safety:  Safety  Do you have working smoke detectors?: Yes  Have all throw rugs been removed or fastened?: Yes  Do you have non-slip mats or surfaces in all bathtubs/showers?: Yes  Do all of your stairways have a railing or banister?: Yes  Are your doorways, halls and stairs free of clutter?: Yes  Do you always fasten your seatbelt when you are in a car?: (!) No    ADL:  ADLs  In the past 7 days, did you need help from others to perform any of the following everyday activities? Eating, dressing, grooming, bathing, toileting, or walking/balance?: None  In the past 7 days, did you need help from others to take care of any of the following?  Laundry, housekeeping, banking/finances, shopping, telephone use, food preparation, transportation, or taking medications?: (!) Laundry    Personalized Preventive Plan   Current Health Maintenance Status  Immunization History   Administered Date(s) Administered    COVID-19, Moderna, PF, 100mcg/0.5mL 01/22/2021, 02/19/2021    DT (pediatric) 10/18/2012    Influenza Virus Vaccine 11/21/2013, 09/23/2015    Influenza Whole 09/29/2011    Influenza, High Dose (Fluzone 65 yrs and older) 10/15/2012, 09/16/2014, 11/06/2016, 09/25/2017, 10/19/2018, 10/13/2019    Pneumococcal Conjugate 13-valent ([id number]) 01/24/2017    Pneumococcal Polysaccharide ([id number]) 12/22/1997, 10/13/2011    Td, unspecified formulation 03/14/1983    Zoster Live (Zostavax) 11/21/2013        Health Maintenance   Topic Date Due    Shingles Vaccine (2 of 3) 01/16/2014    TSH testing  10/21/2020    Annual Wellness Visit (AWV)  08/22/2021    Flu vaccine (1) 09/01/2021    Potassium monitoring  04/22/2022    Creatinine monitoring  04/22/2022    DTaP/Tdap/Td vaccine (2 - Tdap) 10/18/2022    Pneumococcal 65+ years Vaccine  Completed    COVID-19 Vaccine  Completed    Hepatitis A vaccine  Aged Out  Hepatitis B vaccine  Aged Out    Hib vaccine  Aged Out    Meningococcal (ACWY) vaccine  Aged Out     Recommendations for [employer] Due: see orders and patient instructions/AVS.  . Recommended screening schedule for the next 5-10 years is provided to the patient in written form: see Patient Instructions/AVS.    Diagnoses and all orders for this visit:    Medicare annual wellness visit, subsequent/Routine general medical examination at a health care facility  Return 1 month  Other idiopathic scoliosis, thoracolumbar region  OARRS report reviewed and no inconsistencies noted   The patient understands the risks of dependency/addiction with tramadol and will take as little as possible and discontinue as soon as possible     Pulmonary hypertension ([facility] [address]  -     Hepatic Function Panel; Future  Patient is followed by Dr. Sae Alfaro  CTEPH (chronic thromboembolic pulmonary hypertension) [facility])  Patient remains on warfarin and is seen by the anticoagulation clinic  Hypothyroidism, unspecified type  -     TSH without Reflex; Future  -     T4; Future  Await lab to determine stability.   Continue current treatment with levothyroxine 75 mcg  Needs flu shot  -     INFLUENZA, QUADV, ADJUVANTED, 65 YRS =, IM, PF, PREFILL SYR, 0.5ML (FLUAD)

## 2021-09-30 ENCOUNTER — ANTI-COAG VISIT (OUTPATIENT)
Dept: PHARMACY | Age: 86
End: 2021-09-30
Payer: MEDICARE

## 2021-09-30 ENCOUNTER — HOSPITAL ENCOUNTER (OUTPATIENT)
Age: 86
Discharge: HOME | End: 2021-09-30
Payer: MEDICARE

## 2021-09-30 DIAGNOSIS — I27.20 PULMONARY HYPERTENSION (HCC): ICD-10-CM

## 2021-09-30 DIAGNOSIS — Z86.711 HISTORY OF PULMONARY EMBOLISM: Primary | ICD-10-CM

## 2021-09-30 DIAGNOSIS — E03.9 HYPOTHYROIDISM, UNSPECIFIED TYPE: ICD-10-CM

## 2021-09-30 LAB
ALBUMIN SERPL-MCNC: 3.8 G/DL (ref 3.4–5)
ALP BLD-CCNC: 70 U/L (ref 40–129)
ALT SERPL-CCNC: 12 U/L (ref 10–40)
AST SERPL-CCNC: 20 U/L (ref 15–37)
BILIRUB SERPL-MCNC: 0.4 MG/DL (ref 0–1)
BILIRUBIN DIRECT: <0.2 MG/DL (ref 0–0.3)
BILIRUBIN, INDIRECT: NORMAL MG/DL (ref 0–1)
INTERNATIONAL NORMALIZATION RATIO, POC: 2.7
T4 TOTAL: 9.5 UG/DL (ref 4.5–10.9)
TOTAL PROTEIN: 6.8 G/DL (ref 6.4–8.2)
TSH SERPL DL<=0.05 MIU/L-ACNC: 5.17 UIU/ML (ref 0.27–4.2)

## 2021-09-30 PROCEDURE — 84443 ASSAY THYROID STIM HORMONE: CPT

## 2021-09-30 PROCEDURE — 85610 PROTHROMBIN TIME: CPT

## 2021-09-30 PROCEDURE — 99211 OFF/OP EST MAY X REQ PHY/QHP: CPT

## 2021-09-30 PROCEDURE — 84436 ASSAY OF TOTAL THYROXINE: CPT

## 2021-09-30 PROCEDURE — 80076 HEPATIC FUNCTION PANEL: CPT

## 2021-09-30 PROCEDURE — 36415 COLL VENOUS BLD VENIPUNCTURE: CPT

## 2021-09-30 NOTE — PROGRESS NOTES
Ms. Mono Pelaez is a 80 y.o. 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 Took Xarelto for the 3 years after her first PE. Then developed \"several small clots\" as it was described. She was then switched to warfarin 5mg as of 12/3/18. Pertinent PMH: HTN, Hx of PE,     Patient Reported Findings:  Yes     No  [x]   []       Patient & son verifies current dosing regimen as listed home nurse that takes care of medications    Daughter accompanied patient today- doesn't do medications, not sure of dosing since has caretaker. She will check when she gets home to confirm dose. --> pt confirms dose    []   [x]       S/S bleeding/bruising/swelling/SOB- denies ---> fell 5/9, hit head, spoke with doctor because refused ER so went to doc and was assessed- fine---> denies   []   [x]       Blood in urine or stool- denies   []   [x]       Procedures scheduled in the future at this time   [x]   []       Missed Dose-  Missed some doses over the last week per son, unsure how many and which days. -->  Has missed a few doses in the past month, most recently last night and once last week ---> missed yesterday---> denies ---> missed Sunday---> denies      []   [x]       Extra Dose- Denies   []   [x]       Change in medications started taking iron and stool softener---> no changes---> abx for UTI, doesn't know name, done with it for >1 week---> denies   []   [x]       Change in health/diet/appetite denies changes, no NVD   []   [x]       Change in alcohol use  []   [x]       Change in activity  []   [x]       Hospital admission  []   [x]       Emergency department visit  []   [x]       Other complaints     Clinical Outcomes:  Yes     No  []   [x]       Major bleeding event  []   [x]       Thromboembolic event    Duration of warfarin Therapy: indefinitely  INR Range:  2.0-3.0     Presents with son today.      INR is 2.7 today   Continue weekly dose of 10mg on Mon and 7.5 mg all other days of the week. Encouraged to maintain a consistency of vegetables/salads.    Recheck INR in 6 weeks, 11/11    Referring is Dr. Dillon Edmonds   INR (no units)   Date Value   09/30/2021 2.7   08/19/2021 2.7   07/08/2021 1.8   05/27/2021 3.0   05/28/2020 2.82 (H)   05/14/2020 1.40 (H)   04/30/2020 1.03   03/27/2020 1.99 (H)     For Pharmacy Admin Tracking Only   Total # of Interventions Recommended: 0   Total # of Interventions Accepted: 0   Time Spent (min): [facility], PharmD

## 2021-10-06 ENCOUNTER — TELEPHONE (OUTPATIENT)
Dept: CARDIOLOGY CLINIC | Age: 86
End: 2021-10-06

## 2021-10-07 NOTE — TELEPHONE ENCOUNTER
I don't see that she has ever seen EP, not sure how/why that was scheduled. Did she have a new issue with palpitations? If not, then she can just see me.  Gómez Dawn

## 2021-10-07 NOTE — TELEPHONE ENCOUNTER
Called Celina the dtg and she states she will have patient son call to discuss this and figure out who patient needs to schedule with.

## 2021-11-11 ENCOUNTER — ANTI-COAG VISIT (OUTPATIENT)
Dept: PHARMACY | Age: 86
End: 2021-11-11
Payer: MEDICARE

## 2021-11-11 DIAGNOSIS — Z86.711 HISTORY OF PULMONARY EMBOLISM: Primary | ICD-10-CM

## 2021-11-11 LAB — INTERNATIONAL NORMALIZATION RATIO, POC: 2.3

## 2021-11-11 PROCEDURE — 99211 OFF/OP EST MAY X REQ PHY/QHP: CPT

## 2021-11-11 PROCEDURE — 85610 PROTHROMBIN TIME: CPT

## 2021-11-11 NOTE — PROGRESS NOTES
Ms. Chelsea Howell is a 80 y.o. 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 Took Xarelto for the 3 years after her first PE. Then developed \"several small clots\" as it was described. She was then switched to warfarin 5mg as of 12/3/18. Pertinent PMH: HTN, Hx of PE,     Patient Reported Findings:  Yes     No  [x]   []       Patient & son verifies current dosing regimen as listed home nurse that takes care of medications    Daughter accompanied patient today- doesn't do medications, not sure of dosing since has caretaker. She will check when she gets home to confirm dose. --> pt confirms dose    []   [x]       S/S bleeding/bruising/swelling/SOB- denies ---> fell 5/9, hit head, spoke with doctor because refused ER so went to doc and was assessed- fine---> denies   []   [x]       Blood in urine or stool- denies   []   [x]       Procedures scheduled in the future at this time   [x]   []       Missed Dose-  Missed some doses over the last week per son, unsure how many and which days. -->  Has missed a few doses in the past month, most recently last night and once last week ---> missed yesterday---> denies ---> missed Sunday---> yesterday     []   [x]       Extra Dose- Denies   []   [x]       Change in medications started taking iron and stool softener---> no changes---> abx for UTI, doesn't know name, done with it for >1 week---> denies   []   [x]       Change in health/diet/appetite denies changes, no NVD   []   [x]       Change in alcohol use  []   [x]       Change in activity  []   [x]       Hospital admission  []   [x]       Emergency department visit  []   [x]       Other complaints     Clinical Outcomes:  Yes     No  []   [x]       Major bleeding event  []   [x]       Thromboembolic event    Duration of warfarin Therapy: indefinitely  INR Range:  2.0-3.0     Presents with daughter today.      INR is 2.3 today   Since missed dose last night, will boost tonight. Take 10 mg tonight then continue weekly dose of 10mg on Mon and 7.5 mg all other days of the week. Encouraged to maintain a consistency of vegetables/salads.    Recheck INR in 6 weeks, 12/23    Patient consent signed 11/11/21    Referring is Dr. Neri Dow   INR (no units)   Date Value   09/30/2021 2.7   08/19/2021 2.7   07/08/2021 1.8   05/27/2021 3.0   05/28/2020 2.82 (H)   05/14/2020 1.40 (H)   04/30/2020 1.03   03/27/2020 1.99 (H)       For Pharmacy [address] Intervention Detail: Dose Adjustment: 1, reason: Therapy Optimization   Total # of Interventions Recommended: 1   Total # of Interventions Accepted: 1   Time Spent (min): [address] [facility], PharmD

## 2021-11-23 ENCOUNTER — HOSPITAL ENCOUNTER (INPATIENT)
Age: 86
LOS: 2 days | Discharge: HOME HEALTH | DRG: 689 | End: 2021-11-25
Attending: INTERNAL MEDICINE | Admitting: INTERNAL MEDICINE
Payer: MEDICARE

## 2021-11-23 ENCOUNTER — APPOINTMENT (OUTPATIENT)
Dept: GENERAL RADIOLOGY | Age: 86
DRG: 689 | End: 2021-11-23
Payer: MEDICARE

## 2021-11-23 ENCOUNTER — APPOINTMENT (OUTPATIENT)
Dept: CT IMAGING | Age: 86
DRG: 689 | End: 2021-11-23
Payer: MEDICARE

## 2021-11-23 DIAGNOSIS — R41.0 ACUTE DELIRIUM: Primary | ICD-10-CM

## 2021-11-23 DIAGNOSIS — N30.00 ACUTE CYSTITIS WITHOUT HEMATURIA: ICD-10-CM

## 2021-11-23 PROBLEM — G93.40 ENCEPHALOPATHY: Status: ACTIVE | Noted: 2021-11-23

## 2021-11-23 LAB
A/G RATIO: 1.5 (ref 1.1–2.2)
ALBUMIN SERPL-MCNC: 4.3 G/DL (ref 3.4–5)
ALP BLD-CCNC: 84 U/L (ref 40–129)
ALT SERPL-CCNC: 16 U/L (ref 10–40)
ANION GAP SERPL CALCULATED.3IONS-SCNC: 9 MMOL/L (ref 3–16)
APTT: 63 SEC (ref 26.2–38.6)
AST SERPL-CCNC: 28 U/L (ref 15–37)
BACTERIA: ABNORMAL /HPF
BASOPHILS ABSOLUTE: 0.1 K/UL (ref 0–0.2)
BASOPHILS RELATIVE PERCENT: 0.5 %
BILIRUB SERPL-MCNC: 0.7 MG/DL (ref 0–1)
BILIRUBIN URINE: NEGATIVE
BLOOD, URINE: ABNORMAL
BUN BLDV-MCNC: 18 MG/DL (ref 7–20)
CALCIUM SERPL-MCNC: 10.1 MG/DL (ref 8.3–10.6)
CHLORIDE BLD-SCNC: 104 MMOL/L (ref 99–110)
CLARITY: ABNORMAL
CO2: 30 MMOL/L (ref 21–32)
COLOR: YELLOW
CREAT SERPL-MCNC: 0.5 MG/DL (ref 0.6–1.2)
EOSINOPHILS ABSOLUTE: 0 K/UL (ref 0–0.6)
EOSINOPHILS RELATIVE PERCENT: 0 %
EPITHELIAL CELLS, UA: 1 /HPF (ref 0–5)
GFR AFRICAN AMERICAN: >60
GFR NON-AFRICAN AMERICAN: >60
GLUCOSE BLD-MCNC: 118 MG/DL (ref 70–99)
GLUCOSE URINE: NEGATIVE MG/DL
HCT VFR BLD CALC: 45 % (ref 36–48)
HEMOGLOBIN: 14.7 G/DL (ref 12–16)
HYALINE CASTS: 1 /LPF (ref 0–8)
INR BLD: 1.29 (ref 0.88–1.12)
KETONES, URINE: NEGATIVE MG/DL
LACTIC ACID, SEPSIS: 1 MMOL/L (ref 0.4–1.9)
LEUKOCYTE ESTERASE, URINE: ABNORMAL
LYMPHOCYTES ABSOLUTE: 0.3 K/UL (ref 1–5.1)
LYMPHOCYTES RELATIVE PERCENT: 2.1 %
MCH RBC QN AUTO: 28.5 PG (ref 26–34)
MCHC RBC AUTO-ENTMCNC: 32.6 G/DL (ref 31–36)
MCV RBC AUTO: 87.3 FL (ref 80–100)
MICROSCOPIC EXAMINATION: YES
MONOCYTES ABSOLUTE: 0.9 K/UL (ref 0–1.3)
MONOCYTES RELATIVE PERCENT: 5.3 %
NEUTROPHILS ABSOLUTE: 14.9 K/UL (ref 1.7–7.7)
NEUTROPHILS RELATIVE PERCENT: 92.1 %
NITRITE, URINE: POSITIVE
PDW BLD-RTO: 15.8 % (ref 12.4–15.4)
PH UA: 5.5 (ref 5–8)
PLATELET # BLD: 309 K/UL (ref 135–450)
PMV BLD AUTO: 8.5 FL (ref 5–10.5)
POTASSIUM SERPL-SCNC: 3.7 MMOL/L (ref 3.5–5.1)
PRO-BNP: 803 PG/ML (ref 0–449)
PROCALCITONIN: 9.56 NG/ML (ref 0–0.15)
PROTEIN UA: ABNORMAL MG/DL
PROTHROMBIN TIME: 14.7 SEC (ref 9.9–12.7)
RBC # BLD: 5.16 M/UL (ref 4–5.2)
RBC UA: 3 /HPF (ref 0–4)
SODIUM BLD-SCNC: 143 MMOL/L (ref 136–145)
SPECIFIC GRAVITY UA: 1.02 (ref 1–1.03)
TOTAL CK: 488 U/L (ref 26–192)
TOTAL PROTEIN: 7.1 G/DL (ref 6.4–8.2)
TROPONIN: <0.01 NG/ML
URINE REFLEX TO CULTURE: ABNORMAL
URINE TYPE: ABNORMAL
UROBILINOGEN, URINE: 0.2 E.U./DL
WBC # BLD: 16.2 K/UL (ref 4–11)
WBC UA: 4 /HPF (ref 0–5)

## 2021-11-23 PROCEDURE — 94760 N-INVAS EAR/PLS OXIMETRY 1: CPT

## 2021-11-23 PROCEDURE — 6360000004 HC RX CONTRAST MEDICATION: Performed by: PHYSICIAN ASSISTANT

## 2021-11-23 PROCEDURE — 74177 CT ABD & PELVIS W/CONTRAST: CPT

## 2021-11-23 PROCEDURE — 80053 COMPREHEN METABOLIC PANEL: CPT

## 2021-11-23 PROCEDURE — 85610 PROTHROMBIN TIME: CPT

## 2021-11-23 PROCEDURE — 82550 ASSAY OF CK (CPK): CPT

## 2021-11-23 PROCEDURE — 1200000000 HC SEMI PRIVATE

## 2021-11-23 PROCEDURE — 2700000000 HC OXYGEN THERAPY PER DAY

## 2021-11-23 PROCEDURE — 6370000000 HC RX 637 (ALT 250 FOR IP): Performed by: INTERNAL MEDICINE

## 2021-11-23 PROCEDURE — 99285 EMERGENCY DEPT VISIT HI MDM: CPT

## 2021-11-23 PROCEDURE — 36415 COLL VENOUS BLD VENIPUNCTURE: CPT

## 2021-11-23 PROCEDURE — 83880 ASSAY OF NATRIURETIC PEPTIDE: CPT

## 2021-11-23 PROCEDURE — 84484 ASSAY OF TROPONIN QUANT: CPT

## 2021-11-23 PROCEDURE — G0378 HOSPITAL OBSERVATION PER HR: HCPCS

## 2021-11-23 PROCEDURE — 85025 COMPLETE CBC W/AUTO DIFF WBC: CPT

## 2021-11-23 PROCEDURE — 96375 TX/PRO/DX INJ NEW DRUG ADDON: CPT

## 2021-11-23 PROCEDURE — 87040 BLOOD CULTURE FOR BACTERIA: CPT

## 2021-11-23 PROCEDURE — 84145 PROCALCITONIN (PCT): CPT

## 2021-11-23 PROCEDURE — 70450 CT HEAD/BRAIN W/O DYE: CPT

## 2021-11-23 PROCEDURE — 93005 ELECTROCARDIOGRAM TRACING: CPT | Performed by: PHYSICIAN ASSISTANT

## 2021-11-23 PROCEDURE — 83605 ASSAY OF LACTIC ACID: CPT

## 2021-11-23 PROCEDURE — 96374 THER/PROPH/DIAG INJ IV PUSH: CPT

## 2021-11-23 PROCEDURE — 2580000003 HC RX 258: Performed by: INTERNAL MEDICINE

## 2021-11-23 PROCEDURE — 81001 URINALYSIS AUTO W/SCOPE: CPT

## 2021-11-23 PROCEDURE — 71045 X-RAY EXAM CHEST 1 VIEW: CPT

## 2021-11-23 PROCEDURE — 84443 ASSAY THYROID STIM HORMONE: CPT

## 2021-11-23 PROCEDURE — 6360000002 HC RX W HCPCS: Performed by: PHYSICIAN ASSISTANT

## 2021-11-23 PROCEDURE — 72125 CT NECK SPINE W/O DYE: CPT

## 2021-11-23 PROCEDURE — 85730 THROMBOPLASTIN TIME PARTIAL: CPT

## 2021-11-23 RX ORDER — ONDANSETRON 2 MG/ML
4 INJECTION INTRAMUSCULAR; INTRAVENOUS ONCE
Status: COMPLETED | OUTPATIENT
Start: 2021-11-23 | End: 2021-11-23

## 2021-11-23 RX ORDER — ACETAMINOPHEN 650 MG/1
650 SUPPOSITORY RECTAL EVERY 6 HOURS PRN
Status: DISCONTINUED | OUTPATIENT
Start: 2021-11-23 | End: 2021-11-25 | Stop reason: HOSPADM

## 2021-11-23 RX ORDER — SODIUM CHLORIDE 0.9 % (FLUSH) 0.9 %
5-40 SYRINGE (ML) INJECTION PRN
Status: DISCONTINUED | OUTPATIENT
Start: 2021-11-23 | End: 2021-11-25 | Stop reason: HOSPADM

## 2021-11-23 RX ORDER — FERROUS SULFATE 325(65) MG
325 TABLET ORAL 2 TIMES DAILY WITH MEALS
Status: DISCONTINUED | OUTPATIENT
Start: 2021-11-23 | End: 2021-11-25 | Stop reason: HOSPADM

## 2021-11-23 RX ORDER — WARFARIN SODIUM 5 MG/1
10 TABLET ORAL
Status: COMPLETED | OUTPATIENT
Start: 2021-11-23 | End: 2021-11-23

## 2021-11-23 RX ORDER — ACETAMINOPHEN 325 MG/1
650 TABLET ORAL EVERY 6 HOURS PRN
Status: DISCONTINUED | OUTPATIENT
Start: 2021-11-23 | End: 2021-11-25 | Stop reason: HOSPADM

## 2021-11-23 RX ORDER — POLYETHYLENE GLYCOL 3350 17 G/17G
17 POWDER, FOR SOLUTION ORAL DAILY PRN
Status: DISCONTINUED | OUTPATIENT
Start: 2021-11-23 | End: 2021-11-25 | Stop reason: HOSPADM

## 2021-11-23 RX ORDER — SODIUM CHLORIDE 9 MG/ML
INJECTION, SOLUTION INTRAVENOUS CONTINUOUS
Status: DISCONTINUED | OUTPATIENT
Start: 2021-11-23 | End: 2021-11-24

## 2021-11-23 RX ORDER — LISINOPRIL 5 MG/1
2.5 TABLET ORAL DAILY
Status: DISCONTINUED | OUTPATIENT
Start: 2021-11-23 | End: 2021-11-25 | Stop reason: HOSPADM

## 2021-11-23 RX ORDER — PANTOPRAZOLE SODIUM 40 MG/1
40 TABLET, DELAYED RELEASE ORAL
Status: DISCONTINUED | OUTPATIENT
Start: 2021-11-23 | End: 2021-11-25 | Stop reason: HOSPADM

## 2021-11-23 RX ORDER — ONDANSETRON 2 MG/ML
4 INJECTION INTRAMUSCULAR; INTRAVENOUS EVERY 6 HOURS PRN
Status: DISCONTINUED | OUTPATIENT
Start: 2021-11-23 | End: 2021-11-25 | Stop reason: HOSPADM

## 2021-11-23 RX ORDER — LEVOTHYROXINE SODIUM 0.07 MG/1
75 TABLET ORAL DAILY
Status: DISCONTINUED | OUTPATIENT
Start: 2021-11-23 | End: 2021-11-25 | Stop reason: HOSPADM

## 2021-11-23 RX ORDER — TRAMADOL HYDROCHLORIDE 50 MG/1
50 TABLET ORAL EVERY 6 HOURS PRN
Status: DISCONTINUED | OUTPATIENT
Start: 2021-11-23 | End: 2021-11-25 | Stop reason: HOSPADM

## 2021-11-23 RX ORDER — SODIUM CHLORIDE 9 MG/ML
25 INJECTION, SOLUTION INTRAVENOUS PRN
Status: DISCONTINUED | OUTPATIENT
Start: 2021-11-23 | End: 2021-11-25 | Stop reason: HOSPADM

## 2021-11-23 RX ORDER — SODIUM CHLORIDE 0.9 % (FLUSH) 0.9 %
5-40 SYRINGE (ML) INJECTION EVERY 12 HOURS SCHEDULED
Status: DISCONTINUED | OUTPATIENT
Start: 2021-11-23 | End: 2021-11-25 | Stop reason: HOSPADM

## 2021-11-23 RX ORDER — ONDANSETRON 4 MG/1
4 TABLET, ORALLY DISINTEGRATING ORAL EVERY 8 HOURS PRN
Status: DISCONTINUED | OUTPATIENT
Start: 2021-11-23 | End: 2021-11-25 | Stop reason: HOSPADM

## 2021-11-23 RX ADMIN — LISINOPRIL 2.5 MG: 5 TABLET ORAL at 18:56

## 2021-11-23 RX ADMIN — FERROUS SULFATE TAB 325 MG (65 MG ELEMENTAL FE) 325 MG: 325 (65 FE) TAB at 19:04

## 2021-11-23 RX ADMIN — IOPAMIDOL 75 ML: 755 INJECTION, SOLUTION INTRAVENOUS at 12:17

## 2021-11-23 RX ADMIN — WARFARIN SODIUM 10 MG: 5 TABLET ORAL at 19:05

## 2021-11-23 RX ADMIN — SODIUM CHLORIDE: 9 INJECTION, SOLUTION INTRAVENOUS at 19:07

## 2021-11-23 RX ADMIN — ONDANSETRON 4 MG: 2 INJECTION INTRAMUSCULAR; INTRAVENOUS at 12:26

## 2021-11-23 RX ADMIN — LEVOTHYROXINE SODIUM 75 MCG: 0.07 TABLET ORAL at 19:05

## 2021-11-23 RX ADMIN — Medication 1000 MG: at 12:26

## 2021-11-23 ASSESSMENT — PAIN DESCRIPTION - LOCATION: LOCATION: BACK

## 2021-11-23 ASSESSMENT — ENCOUNTER SYMPTOMS
DIARRHEA: 0
COUGH: 0
ABDOMINAL PAIN: 0
SHORTNESS OF BREATH: 0
NAUSEA: 1
RHINORRHEA: 0

## 2021-11-23 ASSESSMENT — PAIN SCALES - GENERAL
PAINLEVEL_OUTOF10: 0
PAINLEVEL_OUTOF10: 4

## 2021-11-23 ASSESSMENT — PAIN DESCRIPTION - DESCRIPTORS: DESCRIPTORS: ACHING

## 2021-11-23 ASSESSMENT — PAIN DESCRIPTION - PAIN TYPE: TYPE: ACUTE PAIN

## 2021-11-23 ASSESSMENT — PAIN DESCRIPTION - PROGRESSION
CLINICAL_PROGRESSION: NOT CHANGED
CLINICAL_PROGRESSION: NOT CHANGED

## 2021-11-23 ASSESSMENT — PAIN DESCRIPTION - ONSET: ONSET: ON-GOING

## 2021-11-23 ASSESSMENT — PAIN DESCRIPTION - FREQUENCY: FREQUENCY: CONTINUOUS

## 2021-11-23 NOTE — H&P
Hospital Medicine History & Physical      PCP: Everardo Faria MD    Date of Admission: 11/23/2021    Date of Service: Pt seen/examined on 11/23/2021 and Admitted to Inpatient with expected LOS greater than two midnights due to medical therapy. Chief Complaint: Fall, confusion      History Of Present Illness: 80 y.o. pleasant female presents from home with daughter after a fall. Patient is not sure what happened and does not remember how she ended up on the floor. Daughter thinks that she must of passed out or fallen asleep after a fall last night. She was able to call her daughter this morning and let her know that she needs help getting up. Patient appears to be quite lucid at this time, however, daughter says that she still feels that there is significant amount of confusion and patient is not at baseline. She is concerned that patient might have UTI as she usually gets confused when her urine is infected. Patient has chronic saddle anesthesia and usually does not feel UTI and is urine incontinent. At the ED she was found to have leukocytosis with WBC of 12.2 urine is positive for leukocytes and nitrites and has 4+ bacteria. Patient started on Rocephin. Hospitalist consulted for admission.       Past Medical History:          Diagnosis Date    Arthritis     Hyperlipidemia     Hypertension     Hypothyroidism     Lumbar herniated disc     Movement disorder     scoliosis    PAH (pulmonary arterial hypertension) with portal hypertension (HCC)     Pulmonary emboli ([address] 12/2014    Scoliosis     Scoliosis     Thyroid disease     hypothyroid       Past Surgical History:          Procedure Laterality Date    COLONOSCOPY  7/08    EXCISION / BIOPSY SKIN LESION OF ARM Left 9/18/2019    EXCISION LEFT FOREARM MASS performed by Vance Jean MD at [address]      both knees    NECK SURGERY Right 9/18/2019    EXCISION OF RIGHT NECK MASS performed by Vance Jean MD at [facility]       Medications Prior to Admission:      Prior to Admission medications    Medication Sig Start Date End Date Taking? Authorizing Provider   traMADol (ULTRAM) 50 MG tablet TAKE 1 TABLET BY MOUTH EVERY 8 HOURS AS NEEDED FOR PAIN FOR UP TO 30 DAYS. 10/26/21 11/25/21  Andra Grullon MD   lisinopril (PRINIVIL;ZESTRIL) 2.5 MG tablet TAKE 1 TABLET BY MOUTH EVERY DAY 10/26/21   Andra Grullon MD   levothyroxine (SYNTHROID) 75 MCG tablet TAKE 1 TABLET BY MOUTH EVERY DAY 9/8/21   Andra Grullon MD   Riociguat (ADEMPAS) 2.5 MG TABS Take 2.5 mg by mouth 3 times daily 7/7/21   Rosalind Teague MD   furosemide (LASIX) 20 MG tablet Take one tablet as needed every 12 hours for shortness of breath or swelling. 5/11/21   Margaret Rush MD   ferrous sulfate (IRON 325) 325 (65 Fe) MG tablet Take 1 tablet by mouth 2 times daily 3/30/21   Rosalind Teague MD   OXYGEN Inhale 2 L into the lungs continuous    Historical Provider, MD   warfarin (COUMADIN) 5 MG tablet Hold for  INR greater  Than 3.0  Patient taking differently: Hold for  INR greater  Than 3.0  Managed by [facility] 12/5/18   Brittny Matos MD   omeprazole (PRILOSEC) 20 MG delayed release capsule Take 20 mg by mouth daily    Historical Provider, MD   docusate sodium (COLACE) 100 MG capsule Take 1 capsule by mouth daily as needed for Constipation 7/13/17   Mick Villegas MD   Multiple Vitamins-Minerals (THERAPEUTIC MULTIVITAMIN-MINERALS) tablet Take 1 tablet by mouth daily    Historical Provider, MD   Ascorbic Acid (VITAMIN C) 500 MG tablet Take 500 mg by mouth daily    Historical Provider, MD       Allergies:  Naprosyn [naproxen]    Social History:      The patient currently lives home    TOBACCO:   reports that she has quit smoking. Her smoking use included cigarettes. She has a 3.75 pack-year smoking history. She quit smokeless tobacco use about 55 years ago. ETOH:   reports no history of alcohol use.   E-Cigarettes/Vaping Use     Questions Responses E-Cigarette/Vaping Use Never User    Start Date     Passive Exposure     Quit Date     Counseling Given     Comments             Family History:       Reviewed in detail and negative for DM, CAD, Cancer, CVA. Positive as follows:        Problem Relation Age of Onset    High Cholesterol Mother     High Cholesterol Father     Cancer Brother        REVIEW OF SYSTEMS:   Pertinent positives as noted in the HPI. All other systems reviewed and negative. PHYSICAL EXAM PERFORMED:    BP (!) 105/50   Pulse 70   Temp 97.8 °F (36.6 °C) (Oral)   Resp 10   Ht 5' 1\" (1.549 m)   Wt 150 lb (68 kg)   SpO2 99%   BMI 28.34 kg/m²     Physical Exam  Vitals and nursing note reviewed. Constitutional:       General: She is not in acute distress. Appearance: She is normal weight. She is not ill-appearing, toxic-appearing or diaphoretic. HENT:      Head: Normocephalic. Comments: Has some bruising on the left temporal area. Nose: Nose normal.      Mouth/Throat:      Mouth: Mucous membranes are moist.   Eyes:      General: No scleral icterus. Extraocular Movements: Extraocular movements intact. Pupils: Pupils are equal, round, and reactive to light. Cardiovascular:      Rate and Rhythm: Normal rate and regular rhythm. Heart sounds: Murmur heard. No gallop. Comments: Patient has a systolic murmur auscultated best at aortic area. Pulmonary:      Effort: Pulmonary effort is normal. No respiratory distress. Breath sounds: Normal breath sounds. No stridor. No wheezing, rhonchi or rales. Abdominal:      General: Abdomen is flat. There is no distension. Palpations: Abdomen is soft. Tenderness: There is no abdominal tenderness. There is no guarding or rebound. Musculoskeletal:         General: No swelling or tenderness. Cervical back: Neck supple. No rigidity or tenderness. Right lower leg: No edema. Left lower leg: No edema.    Skin:     General: Skin is warm and dry.      Coloration: Skin is not jaundiced or pale. Comments: Senile changes   Neurological:      General: No focal deficit present. Mental Status: She is alert and oriented to person, place, and time. Cranial Nerves: No cranial nerve deficit. Psychiatric:         Mood and Affect: Mood normal.         Behavior: Behavior normal.         Thought Content: Thought content normal.         Judgment: Judgment normal.         Labs:     Recent Labs     11/23/21  1109   WBC 16.2*   HGB 14.7   HCT 45.0        Recent Labs     11/23/21  1109      K 3.7      CO2 30   BUN 18   CREATININE 0.5*   CALCIUM 10.1     Recent Labs     11/23/21  1109   AST 28   ALT 16   BILITOT 0.7   ALKPHOS 84     Recent Labs     11/23/21  1109   INR 1.29*     Recent Labs     11/23/21  1109   CKTOTAL 488*   TROPONINI <0.01       Urinalysis:      Lab Results   Component Value Date    NITRU POSITIVE 11/23/2021    WBCUA 4 11/23/2021    BACTERIA 4+ 11/23/2021    RBCUA 3 11/23/2021    BLOODU SMALL 11/23/2021    SPECGRAV 1.024 11/23/2021    GLUCOSEU Negative 11/23/2021    GLUCOSEU Negative 10/13/2011       Radiology:     CXR: I have reviewed the CXR with the following interpretation: Left basilar opacity felt to be atelectasis  EKG:  I have reviewed the EKG with the following interpretation: Sinus rhythm with first-degree AV block, no acute ischemic changes    CT ABDOMEN PELVIS W IV CONTRAST Additional Contrast? None   Final Result   No acute intraabdominal or intrapelvic abnormality. Colonic diverticulosis. Moderate to large volume of stool in the rectum. Moderate hiatal hernia. CT CERVICAL SPINE WO CONTRAST   Final Result   No acute abnormality of the cervical spine. Severe multilevel facet arthropathy and advanced degenerative changes at the   C1-2 articulation as outlined above         CT HEAD WO CONTRAST   Final Result   No acute intracranial abnormality.       Generalized cerebral atrophy and chronic small vessel white matter ischemic   changes. XR CHEST PORTABLE   Final Result   Left basilar opacity has morphology favoring atelectasis or scar.              ASSESSMENT:    Active Hospital Problems    Diagnosis Date Noted    Encephalopathy [G93.40] 11/23/2021         PLAN:    Encephalopathy  Embolic  Likely secondary to UTI  Mild  Treat underlying cause    Left basilar opacity on chest x-ray  We will check procalcitonin  Currently on Rocephin    UTI  Leukocytosis  Started on Rocephin  Follow-up cultures    Falls  PT OT ordered    History of pulmonary hypertension  Continue home meds    History of hypothyroidism  Continue home dose of T4  We will check TSH    DVT Prophylaxis: Lovenox  Diet: No diet orders on file  Code Status: Prior      Electronically signed by Carolyn Grimm MD on 11/23/2021 at 2:33 PM

## 2021-11-23 NOTE — CONSULTS
Clinical Pharmacy Note: Pharmacy to Dose Warfarin    Pharmacy consulted by Dr. Yaya Alcantar to dose warfarin. Olman Trevizo is a 80 y.o. female  is receiving warfarin for indication: hx of PE. INR Goal Range: 2-3  Prior to admission warfarin dosing regimen: 10mg on Mon and 7.5 mg all other days of the week. INR today:   Lab Results   Component Value Date    INR 1.29 11/23/2021       Assessment/Plan:  INR is subtherapeutic on prior to admission dosing regimen. Based on today's assessment, dose warfarin 10 mg tonight. Daily INR is ordered. Pharmacy will continue to monitor and make adjustments to regimen as necessary.      Thank you for the consult,     Serena OatesD  Pharmacy Resident   [id number]

## 2021-11-23 NOTE — PROGRESS NOTES
Pt seen in  ED, admission completed. Pt is alert and oriented x 4 at this time ( had confusion earlier per report). Pt lives at home alone, and is being admitted for encephalopathy. Plan of care updated, all questions answered.

## 2021-11-23 NOTE — ED PROVIDER NOTES
I did not see this patient personally. I only interpreted their EKG.   Reveals:  Normal sinus rhythm  No ST changes  Heart rate 88  1st deg av block  Similar to prior EKG performed in 11/18       Myels Multani MD  11/23/21 9907

## 2021-11-23 NOTE — ED PROVIDER NOTES
[facility]        Pt Name: Ml Waller  MRN: [id number]  [place] 1934  Date of evaluation: 11/23/2021  Provider: Keturah Sierra PA-C  PCP: Perry Mccormick MD  Note Started: 10:48 AM EST       LILLIAN. I have evaluated this patient. My supervising physician was available for consultation. CHIEF COMPLAINT       Chief Complaint   Patient presents with    Altered Mental Status     Came by [facility] from home, daughter stated pt confused with weakness and vomit in bathroom. A&O x 2. HISTORY OF PRESENT ILLNESS   (Location, Timing/Onset, Context/Setting, Quality, Duration, Modifying Factors, Severity, Associated Signs and Symptoms)  Note limiting factors. Chief Complaint: Fall, altered mental status    Ml Waller is a 80 y.o. female who presents to the emergency department today for evaluation for a fall. The patient states that normally she walks with a walker, and states that around 6 AM this morning she is going to the bathroom, the patient states she did not have her walker with her, she believes that this is why she fell. The patient states that she fell around 6 AM this morning, and apparently daughter found her confused, lying on the bathroom floor and apparently had had an episode of emesis. The patient when she arrives to the ED states that she does not remember having any episodes of emesis. She is alert and oriented x2 at this time. She does have some bruising noted to the left side of her head, and is unsure what she hit her head on. She is unsure if she had any syncope. She is anticoagulated on Coumadin secondary to history of PEs. Patient denies having any dizziness, lightheadedness, chest pain, shortness of breath before her fall, she was that her fall was mechanical in nature. She has not had any recent illnesses otherwise.     Nursing Notes were all reviewed and agreed with or any disagreements were addressed in the HPI. REVIEW OF SYSTEMS    (2-9 systems for level 4, 10 or more for level 5)     Review of Systems   Constitutional: Negative for activity change, appetite change, chills and fever. HENT: Negative for congestion and rhinorrhea. Respiratory: Negative for cough and shortness of breath. Cardiovascular: Negative for chest pain. Gastrointestinal: Positive for nausea. Negative for abdominal pain and diarrhea. Genitourinary: Negative for difficulty urinating, dysuria and hematuria. Neurological: Negative for weakness, numbness and headaches. Positives and Pertinent negatives as per HPI. Except as noted above in the ROS, all other systems were reviewed and negative. PAST MEDICAL HISTORY     Past Medical History:   Diagnosis Date    Arthritis     Hyperlipidemia     Hypertension     Hypothyroidism     Lumbar herniated disc     Movement disorder     scoliosis    PAH (pulmonary arterial hypertension) with portal hypertension (HCC)     Pulmonary emboli ([facility] [address] 12/2014    Scoliosis     Scoliosis     Thyroid disease     hypothyroid         SURGICAL HISTORY     Past Surgical History:   Procedure Laterality Date    COLONOSCOPY  7/08    EXCISION / BIOPSY SKIN LESION OF ARM Left 9/18/2019    EXCISION LEFT FOREARM MASS performed by Sera Gomez MD at [address] [place] [address]      both knees    NECK SURGERY Right 9/18/2019    EXCISION OF RIGHT NECK MASS performed by Sera Gomez MD at [facility]       Previous Medications    ASCORBIC ACID (VITAMIN C) 500 MG TABLET    Take 500 mg by mouth daily    DOCUSATE SODIUM (COLACE) 100 MG CAPSULE    Take 1 capsule by mouth daily as needed for Constipation    FERROUS SULFATE (IRON 325) 325 (65 FE) MG TABLET    Take 1 tablet by mouth 2 times daily    FUROSEMIDE (LASIX) 20 MG TABLET    Take one tablet as needed every 12 hours for shortness of breath or swelling.     LEVOTHYROXINE (SYNTHROID) 75 MCG TABLET    TAKE 1 TABLET BY MOUTH EVERY DAY    LISINOPRIL (PRINIVIL;ZESTRIL) 2.5 MG TABLET    TAKE 1 TABLET BY MOUTH EVERY DAY    MULTIPLE VITAMINS-MINERALS (THERAPEUTIC MULTIVITAMIN-MINERALS) TABLET    Take 1 tablet by mouth daily    OMEPRAZOLE (PRILOSEC) 20 MG DELAYED RELEASE CAPSULE    Take 20 mg by mouth daily    OXYGEN    Inhale 2 L into the lungs continuous    RIOCIGUAT (ADEMPAS) 2.5 MG TABS    Take 2.5 mg by mouth 3 times daily    TRAMADOL (ULTRAM) 50 MG TABLET    TAKE 1 TABLET BY MOUTH EVERY 8 HOURS AS NEEDED FOR PAIN FOR UP TO 30 DAYS. WARFARIN (COUMADIN) 5 MG TABLET    Hold for  INR greater  Than 3.0         ALLERGIES     Naprosyn [naproxen]    FAMILYHISTORY       Family History   Problem Relation Age of Onset    High Cholesterol Mother     High Cholesterol Father     Cancer Brother           SOCIAL HISTORY       Social History     Tobacco Use    Smoking status: Former Smoker     Packs/day: 0.25     Years: 15.00     Pack years: 3.75     Types: Cigarettes    Smokeless tobacco: Former User     Quit date: 12/27/1965   Vaping Use    Vaping Use: Never used   Substance Use Topics    Alcohol use: No    Drug use: No       SCREENINGS    [place] Coma Scale  Eye Opening: Spontaneous  Best Verbal Response: Confused  Best Motor Response: Obeys commands  [place] Coma Scale Score: 14        PHYSICAL EXAM    (up to 7 for level 4, 8 or more for level 5)     ED Triage Vitals [11/23/21 1016]   BP Temp Temp Source Pulse Resp SpO2 Height Weight   123/70 97.8 °F (36.6 °C) Oral 90 16 90 % 5' 1\" (1.549 m) 150 lb (68 kg)       Physical Exam  Vitals and nursing note reviewed. Constitutional:       Appearance: She is well-developed. She is not diaphoretic. HENT:      Head: Normocephalic and atraumatic. Comments: Ecchymosis noted to the left temporal region. No facial bone tenderness.      Right Ear: External ear normal.      Left Ear: External ear normal.      Nose: Nose normal.      Mouth/Throat:      Mouth: Mucous membranes are moist.      Pharynx: Oropharynx is clear. No posterior oropharyngeal erythema. Eyes:      General:         Right eye: No discharge. Left eye: No discharge. Extraocular Movements: Extraocular movements intact. Conjunctiva/sclera: Conjunctivae normal.      Pupils: Pupils are equal, round, and reactive to light. Neck:      Trachea: No tracheal deviation. Cardiovascular:      Rate and Rhythm: Normal rate and regular rhythm. Pulmonary:      Effort: Pulmonary effort is normal. No respiratory distress. Breath sounds: Normal breath sounds. No wheezing or rales. Abdominal:      General: Bowel sounds are normal.      Palpations: Abdomen is soft. Tenderness: There is no abdominal tenderness. There is no guarding. Musculoskeletal:         General: Normal range of motion. Cervical back: Normal range of motion and neck supple. Comments: No midline spinal tenderness   Skin:     General: Skin is warm and dry. Neurological:      General: No focal deficit present. Mental Status: She is alert.    Psychiatric:         Behavior: Behavior normal.         DIAGNOSTIC RESULTS   LABS:    Labs Reviewed   CBC WITH AUTO DIFFERENTIAL - Abnormal; Notable for the following components:       Result Value    WBC 16.2 (*)     RDW 15.8 (*)     Neutrophils Absolute 14.9 (*)     Lymphocytes Absolute 0.3 (*)     All other components within normal limits    Narrative:     Performed at:  [facility] [address], [place], [facility] [place]   Phone [phone]   COMPREHENSIVE METABOLIC PANEL - Abnormal; Notable for the following components:    Glucose 118 (*)     CREATININE 0.5 (*)     All other components within normal limits    Narrative:     Performed at:  [facility]  [address] [facility], [facility] [place]   Phone [phone]   URINE RT REFLEX TO CULTURE - Abnormal; Notable for the following components: Clarity, UA CLOUDY (*)     Blood, Urine SMALL (*)     Protein, UA TRACE (*)     Nitrite, Urine POSITIVE (*)     Leukocyte Esterase, Urine TRACE (*)     All other components within normal limits    Narrative:     Performed at:  [facility] [address]   Phone [phone]   APTT - Abnormal; Notable for the following components:    aPTT 63.0 (*)     All other components within normal limits    Narrative:     Performed at:  [facility] [address]   Phone [phone]   PROTIME-INR - Abnormal; Notable for the following components:    Protime 14.7 (*)     INR 1.29 (*)     All other components within normal limits    Narrative:     Performed at:  [facility] [address]   Phone 21  - Abnormal; Notable for the following components:    Pro- (*)     All other components within normal limits    Narrative:     Performed at:  [facility] [address]   Phone [phone]   CK - Abnormal; Notable for the following components:     Total  (*)     All other components within normal limits    Narrative:     Performed at:  [facility] [address]   Phone [phone]   MICROSCOPIC URINALYSIS - Abnormal; Notable for the following components:    Bacteria, UA 4+ (*)     All other components within normal limits    Narrative:     Performed at:  [facility] [address]   Phone [phone]   CULTURE, BLOOD 2   CULTURE, BLOOD 1   TROPONIN    Narrative:     Performed at:  [facility] [address]   Phone [phone]   LACTATE, SEPSIS    Narrative:     Performed at:  [facility] [address],  [place], [address]   Phone [phone]   LACTATE, SEPSIS       When ordered only abnormal lab results are displayed. All other labs were within normal range or not returned as of this dictation. EKG: When ordered, EKG's are interpreted by the Emergency Department Physician in the absence of a cardiologist.  Please see their note for interpretation of EKG. RADIOLOGY:   Non-plain film images such as CT, Ultrasound and MRI are read by the radiologist. Plain radiographic images are visualized and preliminarily interpreted by the ED Provider with the below findings:        Interpretation per the Radiologist below, if available at the time of this note:    CT ABDOMEN PELVIS W IV CONTRAST Additional Contrast? None   Final Result   No acute intraabdominal or intrapelvic abnormality. Colonic diverticulosis. Moderate to large volume of stool in the rectum. Moderate hiatal hernia. CT CERVICAL SPINE WO CONTRAST   Final Result   No acute abnormality of the cervical spine. Severe multilevel facet arthropathy and advanced degenerative changes at the   C1-2 articulation as outlined above         CT HEAD WO CONTRAST   Final Result   No acute intracranial abnormality. Generalized cerebral atrophy and chronic small vessel white matter ischemic   changes. XR CHEST PORTABLE   Final Result   Left basilar opacity has morphology favoring atelectasis or scar. No results found.         PROCEDURES   Unless otherwise noted below, none     Procedures    CRITICAL CARE TIME   N/A    CONSULTS:  IP CONSULT TO PHARMACY  IP CONSULT TO SOCIAL WORK      EMERGENCY DEPARTMENT COURSE and DIFFERENTIAL DIAGNOSIS/MDM:   Vitals:    Vitals:    11/23/21 1146 11/23/21 1201 11/23/21 1224 11/23/21 1330   BP: (!) 110/56 121/68 110/81 (!) 105/50   Pulse: 82 78  70   Resp: 16 14  10   Temp:       TempSrc:       SpO2: 96% 98%  99%   Weight:

## 2021-11-24 LAB
ANION GAP SERPL CALCULATED.3IONS-SCNC: 9 MMOL/L (ref 3–16)
BASOPHILS ABSOLUTE: 0 K/UL (ref 0–0.2)
BASOPHILS RELATIVE PERCENT: 0.5 %
BUN BLDV-MCNC: 15 MG/DL (ref 7–20)
CALCIUM SERPL-MCNC: 8.8 MG/DL (ref 8.3–10.6)
CHLORIDE BLD-SCNC: 106 MMOL/L (ref 99–110)
CO2: 27 MMOL/L (ref 21–32)
CREAT SERPL-MCNC: 0.6 MG/DL (ref 0.6–1.2)
EOSINOPHILS ABSOLUTE: 0.1 K/UL (ref 0–0.6)
EOSINOPHILS RELATIVE PERCENT: 1.7 %
GFR AFRICAN AMERICAN: >60
GFR NON-AFRICAN AMERICAN: >60
GLUCOSE BLD-MCNC: 96 MG/DL (ref 70–99)
HCT VFR BLD CALC: 37.8 % (ref 36–48)
HEMOGLOBIN: 12.5 G/DL (ref 12–16)
INR BLD: 1.46 (ref 0.88–1.12)
LYMPHOCYTES ABSOLUTE: 0.6 K/UL (ref 1–5.1)
LYMPHOCYTES RELATIVE PERCENT: 8.7 %
MAGNESIUM: 1.8 MG/DL (ref 1.8–2.4)
MCH RBC QN AUTO: 28.8 PG (ref 26–34)
MCHC RBC AUTO-ENTMCNC: 33.2 G/DL (ref 31–36)
MCV RBC AUTO: 87 FL (ref 80–100)
MONOCYTES ABSOLUTE: 0.7 K/UL (ref 0–1.3)
MONOCYTES RELATIVE PERCENT: 10.1 %
NEUTROPHILS ABSOLUTE: 5.3 K/UL (ref 1.7–7.7)
NEUTROPHILS RELATIVE PERCENT: 79 %
PDW BLD-RTO: 15.6 % (ref 12.4–15.4)
PHOSPHORUS: 2.5 MG/DL (ref 2.5–4.9)
PLATELET # BLD: 250 K/UL (ref 135–450)
PMV BLD AUTO: 8.8 FL (ref 5–10.5)
POTASSIUM SERPL-SCNC: 3.6 MMOL/L (ref 3.5–5.1)
PROTHROMBIN TIME: 16.8 SEC (ref 9.9–12.7)
RBC # BLD: 4.35 M/UL (ref 4–5.2)
SODIUM BLD-SCNC: 142 MMOL/L (ref 136–145)
TSH REFLEX: 1.65 UIU/ML (ref 0.27–4.2)
WBC # BLD: 6.7 K/UL (ref 4–11)

## 2021-11-24 PROCEDURE — 97116 GAIT TRAINING THERAPY: CPT

## 2021-11-24 PROCEDURE — 6370000000 HC RX 637 (ALT 250 FOR IP): Performed by: INTERNAL MEDICINE

## 2021-11-24 PROCEDURE — 36415 COLL VENOUS BLD VENIPUNCTURE: CPT

## 2021-11-24 PROCEDURE — 84100 ASSAY OF PHOSPHORUS: CPT

## 2021-11-24 PROCEDURE — 97161 PT EVAL LOW COMPLEX 20 MIN: CPT

## 2021-11-24 PROCEDURE — 97535 SELF CARE MNGMENT TRAINING: CPT

## 2021-11-24 PROCEDURE — 87086 URINE CULTURE/COLONY COUNT: CPT

## 2021-11-24 PROCEDURE — 83735 ASSAY OF MAGNESIUM: CPT

## 2021-11-24 PROCEDURE — 85610 PROTHROMBIN TIME: CPT

## 2021-11-24 PROCEDURE — 1200000000 HC SEMI PRIVATE

## 2021-11-24 PROCEDURE — 96376 TX/PRO/DX INJ SAME DRUG ADON: CPT

## 2021-11-24 PROCEDURE — 85025 COMPLETE CBC W/AUTO DIFF WBC: CPT

## 2021-11-24 PROCEDURE — 97530 THERAPEUTIC ACTIVITIES: CPT

## 2021-11-24 PROCEDURE — G0378 HOSPITAL OBSERVATION PER HR: HCPCS

## 2021-11-24 PROCEDURE — 97166 OT EVAL MOD COMPLEX 45 MIN: CPT

## 2021-11-24 PROCEDURE — 2580000003 HC RX 258: Performed by: INTERNAL MEDICINE

## 2021-11-24 PROCEDURE — 80048 BASIC METABOLIC PNL TOTAL CA: CPT

## 2021-11-24 PROCEDURE — 6360000002 HC RX W HCPCS: Performed by: INTERNAL MEDICINE

## 2021-11-24 RX ORDER — WARFARIN SODIUM 5 MG/1
10 TABLET ORAL
Status: DISCONTINUED | OUTPATIENT
Start: 2021-11-29 | End: 2021-11-25 | Stop reason: HOSPADM

## 2021-11-24 RX ORDER — BISACODYL 10 MG
10 SUPPOSITORY, RECTAL RECTAL DAILY
Status: DISCONTINUED | OUTPATIENT
Start: 2021-11-24 | End: 2021-11-25 | Stop reason: HOSPADM

## 2021-11-24 RX ORDER — WARFARIN SODIUM 7.5 MG/1
7.5 TABLET ORAL
Status: DISCONTINUED | OUTPATIENT
Start: 2021-11-24 | End: 2021-11-25 | Stop reason: HOSPADM

## 2021-11-24 RX ADMIN — LISINOPRIL 2.5 MG: 5 TABLET ORAL at 09:26

## 2021-11-24 RX ADMIN — SODIUM CHLORIDE, PRESERVATIVE FREE 10 ML: 5 INJECTION INTRAVENOUS at 20:35

## 2021-11-24 RX ADMIN — PANTOPRAZOLE SODIUM 40 MG: 40 TABLET, DELAYED RELEASE ORAL at 06:20

## 2021-11-24 RX ADMIN — LEVOTHYROXINE SODIUM 75 MCG: 0.07 TABLET ORAL at 06:20

## 2021-11-24 RX ADMIN — WARFARIN SODIUM 7.5 MG: 7.5 TABLET ORAL at 18:39

## 2021-11-24 RX ADMIN — FERROUS SULFATE TAB 325 MG (65 MG ELEMENTAL FE) 325 MG: 325 (65 FE) TAB at 09:26

## 2021-11-24 RX ADMIN — FERROUS SULFATE TAB 325 MG (65 MG ELEMENTAL FE) 325 MG: 325 (65 FE) TAB at 16:20

## 2021-11-24 RX ADMIN — Medication 1000 MG: at 16:20

## 2021-11-24 ASSESSMENT — PAIN SCALES - GENERAL
PAINLEVEL_OUTOF10: 0

## 2021-11-24 NOTE — PROGRESS NOTES
Physical Therapy    Facility/Department: [facility] 3A NURSING  Initial Assessment    NAME: Serafin Jay  : 1934  MRN: [id number]    Date of Service: 2021    Discharge Recommendations:      PT Equipment Recommendations  Equipment Needed: No  Other: Defer to next level of care     Serafin Jay scored a 15/ on the AM-PAC short mobility form. Current research shows that an AM-PAC score of 17 or less is typically not associated with a discharge to the patient's home setting. Based on the patient's AM-PAC score and their current functional mobility deficits, it is recommended that the patient have 3-5 sessions per week of Physical Therapy at d/c to increase the patient's independence. Please see assessment section for further patient specific details. If patient discharges prior to next session this note will serve as a discharge summary. Please see below for the latest assessment towards goals. Assessment   Body structures, Functions, Activity limitations: Decreased functional mobility ; Decreased posture; Decreased endurance; Decreased strength; Decreased safe awareness; Decreased balance  Assessment: Pt is an 81 yo female admitted to [facility] for acute delirium, found to have UTI. At this time the patient required increased assist for sit<>stand transfers and fatigues rapidly with ambulating short household distances. The patient lives alone is at a high risk of falling if she returns to her prior environment. Recommending continued skilled PT to safely progress independence with functional mobility and endurance prior to return home in order to decrease risk of falls. Treatment Diagnosis: Generalized weakness and decreased tolerance to activity  Prognosis: Good  Decision Making: Low Complexity  Exam: MMT, ROM, functional mobility, balance  Clinical Presentation: Stable  PT Education: Goals; PT Role; Plan of Care;  Adaptive Device Training; Transfer Training  Patient Education: D/c recommendations - pt verbalized understanding, may require repetition  Barriers to Learning: Mild lingering acute confusion  REQUIRES PT FOLLOW UP: Yes  Activity Tolerance  Activity Tolerance: Patient Tolerated treatment well  Activity Tolerance: Pt on 2L throughout session. After activity: /74, HR 86 bpm, SpO2 94% on 2L. Patient Diagnosis(es): The primary encounter diagnosis was Acute delirium. A diagnosis of Acute cystitis without hematuria was also pertinent to this visit. has a past medical history of Arthritis, Hyperlipidemia, Hypertension, Hypothyroidism, Lumbar herniated disc, Movement disorder, PAH (pulmonary arterial hypertension) with portal hypertension ([address] Pulmonary emboli ([address] Scoliosis, Scoliosis, and Thyroid disease. has a past surgical history that includes Colonoscopy (7/08); joint replacement; Neck surgery (Right, 9/18/2019); and EXCISION / BIOPSY SKIN LESION OF ARM (Left, 9/18/2019). Restrictions  Restrictions/Precautions  Restrictions/Precautions: Fall Risk (high fall risk)  Position Activity Restriction  Other position/activity restrictions: 80 y.o. pleasant female presents from home with daughter after a fall. Patient is not sure what happened and does not remember how she ended up on the floor. Daughter thinks that she must of passed out or fallen asleep after a fall last night. She was able to call her daughter this morning and let her know that she needs help getting up. Patient appears to be quite lucid at this time, however, daughter says that she still feels that there is significant amount of confusion and patient is not at baseline. She is concerned that patient might have UTI as she usually gets confused when her urine is infected. Patient has chronic saddle anesthesia and usually does not feel UTI and is urine incontinent. At the ED she was found to have leukocytosis with WBC of 12.2 urine is positive for leukocytes and nitrites and has 4+ bacteria. Patient started on Rocephin. Hospitalist consulted for admission. Vision/Hearing  Hearing: Within functional limits       Subjective  General  Chart Reviewed: Yes  Patient assessed for rehabilitation services?: Yes  Family / Caregiver Present: No (daughter present at end of session)  Diagnosis: Acute delirium  Follows Commands: Within Functional Limits  General Comment  Comments: Pt semi-reclined in bed upon therapist arrival.  Subjective  Subjective: Agreeable to PT/OT eval. Pt denies pain. States she was up to the chair all morning and would like to get up to the chair at the end of the session. Pain Screening  Patient Currently in Pain: Denies  Vital Signs  Patient Currently in Pain: Denies       Orientation  Orientation  Overall Orientation Status: Within Functional Limits     Social/Functional History  Social/Functional History  Lives With: Alone  Type of Home: House  Home Layout: Two level, Bed/Bath upstairs, 1/2 bath on main level  Home Access: Stairs to enter without rails  Entrance Stairs - Number of Steps: 1 RENETTA  Bathroom Shower/Tub: Walk-in shower, Shower chair with back  [employer]: Handicap height (\"a little higher. \")  Bathroom Equipment: Shower chair, Grab bars in shower, Hand-held shower, Grab bars around toilet  Bathroom Accessibility: Walker accessible  Home Equipment: Rolling walker, Cane, Reacher, Alert Button, Oxygen  ADL Assistance: Needs assistance (S shower, I dressing)  Homemaking Assistance: Needs assistance (someone comes every morning for laundry, cleaning, 7 days/wk as needed)  Ambulation Assistance: Independent (w/RW)  Transfer Assistance: Independent  Active : No  Patient's  Info: children help transport to Dr.  Occupation: Retired  Type of occupation: Retired [employer]   Leisure & Hobbies: Reading, going out with children  Additional Comments: 1 fall PTA only     Cognition   See OT note    Objective  AROM RLE (degrees)  RLE AROM: WFL  AROM LLE (degrees)  LLE AROM : [facility]  Spine  Lumbar: Severe (L) scoliotic curve  Strength RLE  Strength RLE: WFL  Comment: 4/5 hip flexion, knee flexiong; 5/5 knee extension, ankle DF  Strength LLE  Strength LLE: WFL  Comment: 4/5 hip flexion, knee flexiong; 5/5 knee extension, ankle DF        Sensation  Overall Sensation Status: WNL     Bed mobility  Supine to Sit: Stand by assistance; Moderate assistance (SBA progressing to Mod A at trunk, with increased time, use of rail and HOB elevated)  Sit to Supine: Unable to assess (pt up in chair at end of session)  Scooting: Maximal assistance (at EOB)     Transfers  Sit to Stand: Contact guard assistance; 2 Person Assistance (CGA from EOB; unable to come to standing from toilet with use of grab bar and Max A x1, from toilet with Min A + Mod A with use of grab bar and tactile cues for forward weight shift; CGA from chair with armrests)  Stand to sit: Minimal Assistance; Contact guard assistance (Min A to control descent to toilet with use of grab bar; CGA with poor control of decent to chair; Min A to chair to control descent)  Comment: Pt required cues for hand placement with a majority of transfers     Ambulation  Ambulation?: Yes  Ambulation 1  Surface: level tile  Device: Rolling Walker  Other Apparatus: O2 (2L)  Assistance: Contact guard assistance  Quality of Gait: Decreased step length/heigth (B), increasingly forward flexed posture, narrow EMILIANA, increasingly slow bonilla (cues to maintain walker closer to EMILIANA)  Distance: 15 + 10 ft  Comments: Pt ambulated to/from bathroom and fatigued rapidly on the return trip after standing for pericare. Therapist managed all lines. Pt with (L) lateral lean as well, partially due to scoliotic posture and likely partially due to fatigue. Balance  Posture: Poor  Sitting - Static: Fair  Sitting - Dynamic: Fair; -  Standing - Static: Fair; +  Standing - Dynamic: Fair  Comments: Pt sat EOB x15 minutes for lower body dressing.  CGA for static sitting balane, and CGA-Mod A for dynamic sitting balance due to (L) lean. Pt stood x2 minutes for pericare with (B) UE support on RW. CGA for static standing posture. Other activity  Pt assisted up to bathroom, voided bowel. RN notified. See OT note for assist with lower body dressing, pericare, and toiletting. Plan   Plan  Times per week: 3-5x  Current Treatment Recommendations: Strengthening, Transfer Training, Endurance Training, Balance Training, Functional Mobility Training, Stair training, Gait Training, Patient/Caregiver Education & Training, Equipment Evaluation, Education, & procurement, Safety Education & Training, Home Exercise Program  Safety Devices  Type of devices:  All fall risk precautions in place, Patient at risk for falls, Call light within reach, Left in chair, Chair alarm in place, Gait belt, Nurse notified    AM-PAC Score  AM-PAC Inpatient Mobility Raw Score : 15 (11/24/21 1556)  AM-PAC Inpatient T-Scale Score : 39.45 (11/24/21 1556)  Mobility Inpatient CMS 0-100% Score: 57.7 (11/24/21 1556)  Mobility Inpatient CMS G-Code Modifier : CK (11/24/21 1556)          Goals  Short term goals  Time Frame for Short term goals: Before discharge  Short term goal 1: Pt will complete bed mobility Mod I  Short term goal 2: Pt will complete sit<>stand Mod I  Short term goal 3: Pt will ambulate 50 ft with RW Mod I  Short term goal 4: Pt will ascend/descend 1 step without rail, with RW and SBA  Patient Goals   Patient goals : Go home tomorrow       Therapy Time   Individual Concurrent Group Co-treatment   Time In 1310         Time Out 1412         Minutes 62         Timed Code Treatment Minutes: [address], PT, DPT #[id number]

## 2021-11-24 NOTE — PROGRESS NOTES
Shift assessment completed. Routine vitals stable. SpO2 95% on 2LNC. Patient is awake, alert and oriented, up in chair. Respirations are easy and unlabored. Patient does not appear to be in distress. Call light within reach.

## 2021-11-24 NOTE — PROGRESS NOTES
Occupational Therapy   Occupational Therapy Initial Assessment  Date: 2021   Patient Name: Nora Hernández  MRN: [id number]     : 1934    Date of Service: 2021    Discharge Recommendations:  Nora Hernández scored a 16 on the AM-PAC ADL Inpatient form. Current research shows that an AM-PAC score of 17 or less is typically not associated with a discharge to the patient's home setting. Based on the patient's AM-PAC score and their current ADL deficits, it is recommended that the patient have 3-5 sessions per week of Occupational Therapy at d/c to increase the patient's independence. Please see assessment section for further patient specific details. If patient discharges prior to next session this note will serve as a discharge summary. Please see below for the latest assessment towards goals. OT Equipment Recommendations  Equipment Needed: No (TBD next level of care)    Assessment   Performance deficits / Impairments: Decreased functional mobility ; Decreased ADL status; Decreased endurance; Decreased strength; Decreased cognition  Assessment: Pt is below her baseline level of occupational function based on the above deficits associated with acute delerium. pt would benefit from continued skilled acute OT services to address these deficits. Treatment Diagnosis: Decreased ADL status, functional mobility, endurance, strength, and cognition associated with acute delerium  Prognosis: Good  Decision Making: Medium Complexity  History: Pt lives alone, 2-story home, S shower I dressing, 1 fall PTA  Exam: As above  Assistance / Modification: Mod A dynamic sitting balance, Max A LB dressing, Mod A of 1 & Min A of 1 sit to stand from low toilet, CGA ambulation w/RW  OT Education: OT Role; Plan of Care; Orientation; Transfer Training  Patient Education: D/C recommendation. Pt needs reinforcement of learning  Barriers to Learning: Cognition.   REQUIRES OT FOLLOW UP: Yes  Activity Tolerance  Activity Tolerance: Patient Tolerated treatment well; Patient limited by fatigue  Safety Devices  Safety Devices in place: Yes  Type of devices: All fall risk precautions in place; Call light within reach; Left in chair; Nurse notified; Chair alarm in place; Gait belt; Patient at risk for falls (Family members in room w/pt.)           Patient Diagnosis(es): The primary encounter diagnosis was Acute delirium. A diagnosis of Acute cystitis without hematuria was also pertinent to this visit. has a past medical history of Arthritis, Hyperlipidemia, Hypertension, Hypothyroidism, Lumbar herniated disc, Movement disorder, PAH (pulmonary arterial hypertension) with portal hypertension ([address] Pulmonary emboli ([address] Scoliosis, Scoliosis, and Thyroid disease. has a past surgical history that includes Colonoscopy (7/08); joint replacement; Neck surgery (Right, 9/18/2019); and EXCISION / BIOPSY SKIN LESION OF ARM (Left, 9/18/2019). Treatment Diagnosis: Decreased ADL status, functional mobility, endurance, strength, and cognition associated with acute delerium      Restrictions  Restrictions/Precautions  Restrictions/Precautions: Fall Risk (high fall risk)  Position Activity Restriction  Other position/activity restrictions: 80 y.o. pleasant female presents from home with daughter after a fall. Patient is not sure what happened and does not remember how she ended up on the floor. Daughter thinks that she must of passed out or fallen asleep after a fall last night. She was able to call her daughter this morning and let her know that she needs help getting up. Patient appears to be quite lucid at this time, however, daughter says that she still feels that there is significant amount of confusion and patient is not at baseline. She is concerned that patient might have UTI as she usually gets confused when her urine is infected. Patient has chronic saddle anesthesia and usually does not feel UTI and is urine incontinent.  At the ED she was found to have leukocytosis with WBC of 12.2 urine is positive for leukocytes and nitrites and has 4+ bacteria. Patient started on Rocephin. Hospitalist consulted for admission. Subjective   General  Chart Reviewed: Yes  Patient assessed for rehabilitation services?: Yes  Family / Caregiver Present: No  Diagnosis: Acute delerium  Subjective  Subjective: Pt supine in bed, pleasant and agreeable to OT eval. Pt denies pain. Patient Currently in Pain: Denies  Pain Assessment  Pain Assessment: 0-10  Pain Level: 0  Patient Currently in Pain: Denies  Oxygen Therapy  SpO2: 95 %  Pulse Oximeter Device Mode: Intermittent  Pulse Oximeter Device Location: Finger  O2 Device: Nasal cannula  O2 Flow Rate (L/min): 2 L/min  Social/Functional History  Social/Functional History  Lives With: Alone  Type of Home: House  Home Layout: Two level, Bed/Bath upstairs, 1/2 bath on main level  Home Access: Stairs to enter without rails  Entrance Stairs - Number of Steps: 1 RENETTA  Bathroom Shower/Tub: Walk-in shower, Shower chair with back  [employer]: Handicap height (\"a little higher. \")  Bathroom Equipment: Shower chair, Grab bars in shower, Hand-held shower, Grab bars around toilet  Bathroom Accessibility: Walker accessible  Home Equipment: Rolling walker, Cane, Reacher, Alert Button, Oxygen  ADL Assistance: Needs assistance (S shower, I dressing)  Homemaking Assistance: Needs assistance (someone comes every morning for laundry, cleaning, 7 days/wk as needed)  Ambulation Assistance: Independent (w/RW)  Transfer Assistance: Independent  Active : No  Patient's  Info: children help transport to   Occupation: Retired  Type of occupation: Retired [employer]   Leisure & Hobbies: Reading, going out with children  Additional Comments: 1 fall PTA only       Objective   Vision: Impaired  Vision Exceptions: Wears glasses at all times  Hearing: Within functional limits    Orientation  Overall Orientation Status: Impaired  Orientation Level: Oriented to place; Disoriented to time; Oriented to situation; Oriented to person (knows month, stated year is 2012.)     Balance  Sitting Balance: Moderate assistance (Mod A dynamic sitting balance, CGA to Min A static sitting balance on EOB; S on toilet)  Standing Balance: Contact guard assistance (w/RW)  Standing Balance  Time: ~1 min, ~2 min  Activity: functional mobility to bathroom for clothing mgt & toilet transfer; robin hygiene, clothing mgt and functional mobility to chair  Comment: Pt with increasing fatigue. Functional Mobility  Functional - Mobility Device: Rolling Walker  Activity: To/from bathroom  Assist Level: Contact guard assistance  Toilet Transfers  Toilet - Technique: Ambulating  Equipment Used: Standard toilet (grab bar)  Toilet Transfer: Minimal assistance  ADL  LE Dressing: Maximum assistance (Max A doff socks, D don socks w/Mod A dynamic sitting balance on EOB; leaning to L; Mod A don pullups; needs assist to thread feet;CGA clothing mgt)  Toileting: Moderate assistance (Dep robin hygiene after BM; CGA clothing mgt)  Tone RUE  RUE Tone: Normotonic  Tone LUE  LUE Tone: Normotonic  Coordination  Movements Are Fluid And Coordinated: Yes     Bed mobility  Supine to Sit: Stand by assistance; Moderate assistance (SBA progressing to Mod A at trunk, with increased time, use of rail and HOB elevated)  Sit to Supine: Unable to assess (pt up in chair at end of session)  Scooting: Maximal assistance (at EOB)  Transfers  Sit to stand:  (CGA from EOB, Max A of 1 1st attempt from low toilet; Max A of 1 & Mod A of 1 2nd attempt from toilet)  Stand to sit: Minimal assistance (onto toilet; CGA onto recliner; poor eccentric control)  Transfer Comments: Suggested that RN place [facility] over toilet if pt walking to bathroom for toileting. Cognition  Overall Cognitive Status: Exceptions  Arousal/Alertness: Appropriate responses to stimuli  Following Commands:  Follows one step commands consistently  Attention Span: Appears intact  Memory: Decreased short term memory  Safety Judgement: Good awareness of safety precautions  Insights: Decreased awareness of deficits  Initiation: Does not require cues  Perception  Overall Perceptual Status: WFL     Sensation  Overall Sensation Status: WNL        LUE AROM (degrees)  LUE AROM : Exceptions  LUE General AROM: shoulder flexion ~90 degrees, elbow to wrist WFL  Left Hand AROM (degrees)  Left Hand AROM: WFL  RUE AROM (degrees)  RUE AROM : Exceptions  RUE General AROM: shoulder flexion ~90 degrees, elbow to wrist WFL  Right Hand AROM (degrees)  Right Hand AROM: WFL  LUE Strength  Gross LUE Strength: WFL  L Hand General: 4/5 (grossly)  RUE Strength  Gross RUE Strength: WFL  R Hand General: 4/5 (grossly)                   Plan   Plan  Times per week: 3-5  Times per day: Daily  Current Treatment Recommendations: Safety Education & Training, Self-Care / ADL, Endurance Training, Functional Mobility Training      AM-PAC Score        AM-Located within [facility] Inpatient Daily Activity Raw Score: 16 (11/24/21 1641)  AM-PAC Inpatient ADL T-Scale Score : 35.96 (11/24/21 1641)  ADL Inpatient CMS 0-100% Score: 53.32 (11/24/21 1641)  ADL Inpatient CMS G-Code Modifier : CK (11/24/21 1641)    Goals  Short term goals  Time Frame for Short term goals: Discharge  Short term goal 1: SBA for functionl transfers to ADL surfaces including high toilet wRW  Short term goal 2: SBA for functional mobility w/RW for ADL activity  Short term goal 3: SBA for seated UB bathing/dressing  Short term goal 4: Mod A for LB bathing/dressing w/AE as needed  Short term goal 5: Min A for toileting       Therapy Time   Individual Concurrent Group Co-treatment   Time In 1310         Time Out 1411         Minutes 61               Timed Code Treatment Minutes:  46 min    Total Treatment Minutes:  61 min    AP/ [address], [id number], Colette Edmondson, OTR/L, [id number]

## 2021-11-24 NOTE — CARE COORDINATION
Discharge Planning Assessment  RN  discharge planner met with patient and family member- Granddaughter to discuss reason for admission, current living situation, and potential needs at the time of discharge    Demographics/Insurance verified Yes- BCBS  Medicare    Current type of dwellin [facility], has a chair lift to go [facility]    Patient from ECF/SW confirmed with:  N/A     Living arrangements:  Lives alone    Level of function/Support: has a care assistant who helps her with daily routine everyday-  Private pay. - bathing, cooking, cleaning. Patient has good family support that assist her with transportation needs etc.    PCP:  Dr Feli Brasher     Last Visit to PCP:  2 months ago    DME:  Shimon Membreno, Chair lift, elevated toilet seat, shower chair,   Home oxygen  2 lts / n/c through  [facility]/ [facility]   Has a big concentrator and a portable one . Active with any community resources/agencies/skilled home care: Not currently      Medication compliance issues:  Care Assistant helps with setting up the medications. Financial issues that could impact healthcare:  No    Tentative discharge plan: SNF  Vs Home with skilled  hhc services  Therapy recommending SNF patient declines , she would prefer  hhc . She had no preference for hhc agency    Discussed and provided facilities of choice if transition to a skilled nursing facility is required at the time of discharge      Discussed with patient and/or family that on the day of discharge home tentative time of discharge will be between 10 AM and noon.     Transportation at the time of discharge: son or [facility]

## 2021-11-24 NOTE — PLAN OF CARE
Problem: Falls - Risk of:  Goal: Will remain free from falls  Description: Will remain free from falls  11/24/2021 1123 by Analia Michel RN  Outcome: Ongoing  Note: High fall risk per Zhanna Sand scale. Fall precautions in place, bed alarm engaged. Non-skid footwear on patient, belongings within reach, bed in lowest position. Problem: Pain:  Goal: Control of acute pain  Description: Control of acute pain  11/24/2021 1123 by Analia Michel RN  Outcome: Ongoing  Note: Pain assessed Q4 and PRN. PRN pain medication available.   Pt denies pain this AM.

## 2021-11-24 NOTE — ACP (ADVANCE CARE PLANNING)
ADVANCED CARE PLANNING    Germán Martinez       :  1934              MRN:  [id number]      Purpose of Encounter: Advanced care planning. Parties in attendance: :Zi Quesada MD, son. Decisional Capacity:Yes    Diagnosis: Active Problems:    Encephalopathy  Resolved Problems:    * No resolved hospital problems. *    Patients Medical Story: 80 y. o. female with h/o PE on Coumadin, pulmonary hypertension on [place], hypothyroidism presented from home with daughter after a fall, found to have UTI with encephalopathy. Goals of Care Determinations: Patient wishes to focus on limited treatment. Plan: Will notify Johana Stallings MD of change in care plan. Will look at further interventions as needed. POA: Sky Linn (Child), [phone]  Code Status: At this time patient wishes to be DNR CCA  Time Spent with Patient: 30 minutes      Electronically signed by Alex Colon MD on 2021 at 4:06 PM  Thank you Johana Stallings MD for the opportunity to be involved in this patient's care.

## 2021-11-24 NOTE — PROGRESS NOTES
Hospitalist Progress Note      PCP: Alexis Babb MD    Date of Admission: 11/23/2021    Chief Complaint: Fall, confusion    Hospital Course: 80 y.o. female with h/o PE on Coumadin, pulmonary hypertension on Adempas, hypothyroidism presented from home with daughter after a fall. Patient was not sure what happened and could not remember how she ended up on the floor. Daughter thought that she must of passed out or fallen asleep after a fall the night prior but she was able to call her daughter this morning and let her know that she needed help getting up. Patient appeared to be quite lucid at the time of presentation, however, daughter insisted that patient still had a significant amount of confusion and was not at her baseline. She was concerned that patient might have UTI as she usually gets confused when her urine is infected. Patient has chronic saddle anesthesia and does not usually have any symptoms with UTI and is incontinent of urine at baseline. In the ED she was found to have leukocytosis with WBC of 12.2, urine was positive for leukocytes and nitrites and has 4+ bacteria. Patient was started on Rocephin.      Subjective: Patient seen and examined. Son at bedside and mention patient was currently at her baseline. No interval events. Denies any complaints. No abdominal pain, dysuria, fever or chills.          Medications:  Reviewed    Infusion Medications    sodium chloride      sodium chloride 50 mL/hr at 11/24/21 0610     Scheduled Medications    warfarin  7.5 mg Oral Once per day on Sun Tue Wed Thu Fri Sat    [START ON 11/29/2021] warfarin  10 mg Oral Once per day on Mon    cefTRIAXone (ROCEPHIN) IV  1,000 mg IntraVENous Q24H    ferrous sulfate  325 mg Oral BID WC    levothyroxine  75 mcg Oral Daily    lisinopril  2.5 mg Oral Daily    pantoprazole  40 mg Oral QAM AC    Riociguat  2.5 mg Oral TID    sodium chloride flush  5-40 mL IntraVENous 2 times per day     PRN Meds: traMADol, sodium Labs     11/23/21  1109   CKTOTAL 488*   TROPONINI <0.01       Urinalysis:      Lab Results   Component Value Date    NITRU POSITIVE 11/23/2021    WBCUA 4 11/23/2021    BACTERIA 4+ 11/23/2021    RBCUA 3 11/23/2021    BLOODU SMALL 11/23/2021    SPECGRAV 1.024 11/23/2021    GLUCOSEU Negative 11/23/2021    GLUCOSEU Negative 10/13/2011       Radiology:  CT ABDOMEN PELVIS W IV CONTRAST Additional Contrast? None   Final Result   No acute intraabdominal or intrapelvic abnormality. Colonic diverticulosis. Moderate to large volume of stool in the rectum. Moderate hiatal hernia. CT CERVICAL SPINE WO CONTRAST   Final Result   No acute abnormality of the cervical spine. Severe multilevel facet arthropathy and advanced degenerative changes at the   C1-2 articulation as outlined above         CT HEAD WO CONTRAST   Final Result   No acute intracranial abnormality. Generalized cerebral atrophy and chronic small vessel white matter ischemic   changes. XR CHEST PORTABLE   Final Result   Left basilar opacity has morphology favoring atelectasis or scar. Assessment/Plan:    Active Hospital Problems    Diagnosis     Encephalopathy [V75.86]      Metabolic encephalopathy due to UTI: Improving  Continue Rocephin for UTI. Monitor patient until return to baseline. Left basilar opacity on chest x-ray:  CT A/P: Mild bibasilar dependent atelectasis. Currently on Rocephin. UTI with Leukocytosis:   Leukocytosis resolved. Continue Rocephin. Follow-up cultures    Constipation: Started bowel regimen. Recurrent falls:   Trauma work-up negative for any acute findings. PT OT ordered     History of pulmonary hypertension: Continue home meds     History of hypothyroidism:   Continue home dose of T4. TSH 1.65 11/23/2021. DVT Prophylaxis: Lovenox  Diet: ADULT DIET; Regular;  Low Sodium (2 gm)  Code Status: Full Code    PT/OT Eval Status: Pending PT eval    Dispo -once medically stable    Michelle Chapa MD

## 2021-11-25 VITALS
OXYGEN SATURATION: 93 % | BODY MASS INDEX: 28.37 KG/M2 | SYSTOLIC BLOOD PRESSURE: 124 MMHG | HEIGHT: 61 IN | WEIGHT: 150.3 LBS | TEMPERATURE: 98.6 F | DIASTOLIC BLOOD PRESSURE: 73 MMHG | HEART RATE: 72 BPM | RESPIRATION RATE: 16 BRPM

## 2021-11-25 PROBLEM — K59.00 CONSTIPATION: Status: ACTIVE | Noted: 2021-11-25

## 2021-11-25 PROBLEM — I27.24 CTEPH (CHRONIC THROMBOEMBOLIC PULMONARY HYPERTENSION) (HCC): Status: ACTIVE | Noted: 2021-11-25

## 2021-11-25 PROBLEM — E03.9 HYPOTHYROID: Status: ACTIVE | Noted: 2021-11-25

## 2021-11-25 PROBLEM — G93.41 METABOLIC ENCEPHALOPATHY: Status: ACTIVE | Noted: 2021-11-25

## 2021-11-25 LAB
ANION GAP SERPL CALCULATED.3IONS-SCNC: 8 MMOL/L (ref 3–16)
BASOPHILS ABSOLUTE: 0 K/UL (ref 0–0.2)
BASOPHILS RELATIVE PERCENT: 0.7 %
BUN BLDV-MCNC: 15 MG/DL (ref 7–20)
CALCIUM SERPL-MCNC: 8.9 MG/DL (ref 8.3–10.6)
CHLORIDE BLD-SCNC: 107 MMOL/L (ref 99–110)
CO2: 29 MMOL/L (ref 21–32)
CREAT SERPL-MCNC: 0.6 MG/DL (ref 0.6–1.2)
EKG ATRIAL RATE: 88 BPM
EKG DIAGNOSIS: NORMAL
EKG P AXIS: 36 DEGREES
EKG P-R INTERVAL: 216 MS
EKG Q-T INTERVAL: 382 MS
EKG QRS DURATION: 84 MS
EKG QTC CALCULATION (BAZETT): 462 MS
EKG R AXIS: 3 DEGREES
EKG T AXIS: 0 DEGREES
EKG VENTRICULAR RATE: 88 BPM
EOSINOPHILS ABSOLUTE: 0.2 K/UL (ref 0–0.6)
EOSINOPHILS RELATIVE PERCENT: 4.7 %
GFR AFRICAN AMERICAN: >60
GFR NON-AFRICAN AMERICAN: >60
GLUCOSE BLD-MCNC: 99 MG/DL (ref 70–99)
HCT VFR BLD CALC: 38.4 % (ref 36–48)
HEMOGLOBIN: 12.8 G/DL (ref 12–16)
INR BLD: 1.67 (ref 0.88–1.12)
LYMPHOCYTES ABSOLUTE: 0.9 K/UL (ref 1–5.1)
LYMPHOCYTES RELATIVE PERCENT: 18.5 %
MAGNESIUM: 1.9 MG/DL (ref 1.8–2.4)
MCH RBC QN AUTO: 28.7 PG (ref 26–34)
MCHC RBC AUTO-ENTMCNC: 33.2 G/DL (ref 31–36)
MCV RBC AUTO: 86.5 FL (ref 80–100)
MONOCYTES ABSOLUTE: 0.9 K/UL (ref 0–1.3)
MONOCYTES RELATIVE PERCENT: 16.9 %
NEUTROPHILS ABSOLUTE: 3 K/UL (ref 1.7–7.7)
NEUTROPHILS RELATIVE PERCENT: 59.2 %
PDW BLD-RTO: 16.1 % (ref 12.4–15.4)
PHOSPHORUS: 2.7 MG/DL (ref 2.5–4.9)
PLATELET # BLD: 246 K/UL (ref 135–450)
PMV BLD AUTO: 8.6 FL (ref 5–10.5)
POTASSIUM SERPL-SCNC: 3.5 MMOL/L (ref 3.5–5.1)
PROTHROMBIN TIME: 19.3 SEC (ref 9.9–12.7)
RBC # BLD: 4.44 M/UL (ref 4–5.2)
SODIUM BLD-SCNC: 144 MMOL/L (ref 136–145)
WBC # BLD: 5.1 K/UL (ref 4–11)

## 2021-11-25 PROCEDURE — 2580000003 HC RX 258: Performed by: INTERNAL MEDICINE

## 2021-11-25 PROCEDURE — 6370000000 HC RX 637 (ALT 250 FOR IP): Performed by: INTERNAL MEDICINE

## 2021-11-25 PROCEDURE — 84100 ASSAY OF PHOSPHORUS: CPT

## 2021-11-25 PROCEDURE — 85025 COMPLETE CBC W/AUTO DIFF WBC: CPT

## 2021-11-25 PROCEDURE — 83735 ASSAY OF MAGNESIUM: CPT

## 2021-11-25 PROCEDURE — 36415 COLL VENOUS BLD VENIPUNCTURE: CPT

## 2021-11-25 PROCEDURE — 93010 ELECTROCARDIOGRAM REPORT: CPT | Performed by: INTERNAL MEDICINE

## 2021-11-25 PROCEDURE — 80048 BASIC METABOLIC PNL TOTAL CA: CPT

## 2021-11-25 PROCEDURE — 85610 PROTHROMBIN TIME: CPT

## 2021-11-25 PROCEDURE — G0378 HOSPITAL OBSERVATION PER HR: HCPCS

## 2021-11-25 RX ORDER — POLYETHYLENE GLYCOL 3350 17 G/17G
17 POWDER, FOR SOLUTION ORAL DAILY
Qty: 30 EACH | Refills: 0 | Status: SHIPPED | OUTPATIENT
Start: 2021-11-25 | End: 2021-12-25

## 2021-11-25 RX ORDER — BISACODYL 10 MG
10 SUPPOSITORY, RECTAL RECTAL DAILY PRN
Qty: 30 SUPPOSITORY | Refills: 0
Start: 2021-11-25 | End: 2021-12-25

## 2021-11-25 RX ORDER — CEFPODOXIME PROXETIL 100 MG/1
100 TABLET, FILM COATED ORAL 2 TIMES DAILY
Qty: 10 TABLET | Refills: 0 | Status: SHIPPED | OUTPATIENT
Start: 2021-11-25 | End: 2021-11-30

## 2021-11-25 RX ORDER — SENNA PLUS 8.6 MG/1
1 TABLET ORAL DAILY
Qty: 30 TABLET | Refills: 0 | Status: SHIPPED | OUTPATIENT
Start: 2021-11-25 | End: 2021-12-25

## 2021-11-25 RX ADMIN — FERROUS SULFATE TAB 325 MG (65 MG ELEMENTAL FE) 325 MG: 325 (65 FE) TAB at 10:04

## 2021-11-25 RX ADMIN — POTASSIUM BICARBONATE 20 MEQ: 782 TABLET, EFFERVESCENT ORAL at 10:04

## 2021-11-25 RX ADMIN — LEVOTHYROXINE SODIUM 75 MCG: 0.07 TABLET ORAL at 05:46

## 2021-11-25 RX ADMIN — LISINOPRIL 2.5 MG: 5 TABLET ORAL at 09:55

## 2021-11-25 RX ADMIN — PANTOPRAZOLE SODIUM 40 MG: 40 TABLET, DELAYED RELEASE ORAL at 05:46

## 2021-11-25 RX ADMIN — SODIUM CHLORIDE, PRESERVATIVE FREE 10 ML: 5 INJECTION INTRAVENOUS at 10:12

## 2021-11-25 ASSESSMENT — PAIN SCALES - GENERAL
PAINLEVEL_OUTOF10: 0

## 2021-11-25 NOTE — PLAN OF CARE
Problem: Falls - Risk of:  Goal: Will remain free from falls  Description: Will remain free from falls  11/25/2021 1007 by Abiodun Hernández RN  Outcome: Ongoing     Problem: Skin Integrity:  Goal: Will show no infection signs and symptoms  Description: Will show no infection signs and symptoms  11/25/2021 1007 by Abiodun Hernández RN  Outcome: Ongoing     Problem: Pain:  Description: Pain management should include both nonpharmacologic and pharmacologic interventions.   Goal: Pain level will decrease  Description: Pain level will decrease  11/25/2021 1007 by Dom Hernández RN  Outcome: Ongoing

## 2021-11-25 NOTE — PLAN OF CARE
Problem: Falls - Risk of:  Goal: Will remain free from falls  Description: Will remain free from falls  11/24/2021 2048 by Melissa Lew RN  Outcome: Ongoing  11/24/2021 1123 by Perry Juarez RN  Outcome: Ongoing  Note: High fall risk per High Schaumann scale. Fall precautions in place, bed alarm engaged. Non-skid footwear on patient, belongings within reach, bed in lowest position. Problem: Skin Integrity:  Goal: Will show no infection signs and symptoms  Description: Will show no infection signs and symptoms  Outcome: Ongoing  Goal: Absence of new skin breakdown  Description: Absence of new skin breakdown  Outcome: Ongoing     Problem: Pain:  Goal: Pain level will decrease  Description: Pain level will decrease  Outcome: Ongoing  Goal: Control of acute pain  Description: Control of acute pain  11/24/2021 1123 by Perry Juarez RN  Outcome: Ongoing  Note: Pain assessed Q4 and PRN. PRN pain medication available.   Pt denies pain this AM.

## 2021-11-25 NOTE — PROGRESS NOTES
Pharmacy to Dose Warfarin    Pharmacy consulted to dose warfarin for hx of PE. INR Goal: 2-3    INR today: 1.67    Assessment/Plan:  - Continue warfarin at home dose. - INR trending up. Pharmacy will continue to follow.     Idania Viera, PharmD, [facility]

## 2021-11-25 NOTE — DISCHARGE INSTR - COC
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    Hypothyroid E03.9    Constipation K59.00       Isolation/Infection:   Isolation            No Isolation          Patient Infection Status       None to display            Nurse Assessment:  Last Vital Signs: /73   Pulse 72   Temp 98.6 °F (37 °C)   Resp 16   Ht 5' 1\" (1.549 m)   Wt 150 lb 4.8 oz (68.2 kg)   SpO2 93%   BMI 28.40 kg/m²     Last documented pain score (0-10 scale): Pain Level: 0  Last Weight:   Wt Readings from Last 1 Encounters:   11/25/21 150 lb 4.8 oz (68.2 kg)     Mental Status:  oriented and alert    IV Access:  - None    Nursing Mobility/ADLs: walker  Walking   Assisted  Transfer  Assisted  Bathing  Assisted  Dressing  Assisted  Toileting  Assisted  Feeding  Independent  Med Admin  Independent  Med Delivery   whole    Wound Care Documentation and Therapy:        Elimination:  Continence: Bowel: Yes  Bladder: unable to assess (pt used a purewick in the hospital)  Urinary Catheter: None   Colostomy/Ileostomy/Ileal Conduit: No       Date of Last BM: 11/24/21. Tend to be constipated    Intake/Output Summary (Last 24 hours) at 11/25/2021 1136  Last data filed at 11/25/2021 0406  Gross per 24 hour   Intake    Output 1950 ml   Net -1950 ml     I/O last 3 completed shifts:  In: -   Out: 1950 [Urine:1950]    Safety Concerns:      At Risk for Falls    Impairments/Disabilities:      None    Nutrition Therapy:  Current Nutrition Therapy:   - Oral Diet:  General and Low Sodium (2gm)    Routes of Feeding: Oral  Liquids: Thin Liquids  Daily Fluid Restriction: no  Last Modified Barium Swallow with Video (Video Swallowing Test): not done    Treatments at the Time of Hospital Discharge:   Respiratory Treatments: home O2 at 2L per nasal cannula  Oxygen Therapy:  is not on home oxygen therapy. Ventilator:    - No ventilator support    Rehab Therapies: none  Weight Bearing Status/Restrictions: No weight bearing restirctions  Other Medical Equipment (for information only, NOT a DME order):  none  Other Treatments: none    Patient's personal belongings (please select all that are sent with patient):  Dentures upper and lower    RN SIGNATURE:  Electronically signed by Que Anglin RN on 11/25/21 at 12:22 PM EST    CASE MANAGEMENT/SOCIAL WORK SECTION    Inpatient Status Date: ***    Readmission Risk Assessment Score:  Readmission Risk              Risk of Unplanned Readmission:  14           Discharging to Facility/ Agency   Name:   Address:  Phone:  Fax:    Dialysis Facility (if applicable)   Name:  Address:  Dialysis Schedule:  Phone:  Fax:    / signature: {Esignature:[id number]}    PHYSICIAN SECTION    Prognosis: Good    Condition at Discharge: Stable    Rehab Potential (if transferring to Rehab): Good    Recommended Labs or Other Treatments After Discharge: None    Physician Certification: I certify the above information and transfer of Talia Stiles  is necessary for the continuing treatment of the diagnosis listed and that she requires Home Care for greater 30 days.      Update Admission H&P: No change in H&P    PHYSICIAN SIGNATURE:  Electronically signed by Kiara Aguila MD on 11/25/21 at 11:36 AM EST

## 2021-11-25 NOTE — DISCHARGE SUMMARY
not sure what happened and could not remember how she ended up on the floor. Daughter thought that she must of passed out or fallen asleep after a fall the night prior but she was able to call her daughter this morning and let her know that she needed help getting up. Patient appeared to be quite lucid at the time of presentation, however, daughter insisted that patient still had a significant amount of confusion and was not at her baseline. She was concerned that patient might have UTI as she usually gets confused when her urine is infected. Patient has chronic saddle anesthesia and does not usually have any symptoms with UTI and is incontinent of urine at baseline. In the ED she was found to have leukocytosis with WBC of 12.2, urine was positive for leukocytes and nitrites and has 4+ bacteria. Patient was started on Rocephin.         Metabolic encephalopathy due to UTI:   Continued Rocephin for UTI and discharged on Vantin to complete course of ABx. Patient currently at baseline per family.     Left basilar opacity on chest x-ray:  CT A/P: Mild bibasilar dependent atelectasis. Doubt pneumonia. Complete antibiotic course for UTI as above. UTI with Leukocytosis:   Leukocytosis resolved. Blood and urine cultures pending. Constipation: Started on a bowel regimen.     Recurrent falls:   Trauma work-up negative for any acute findings. PT OT recommended SNF but patient and family declined. She will be discharged home with services.     History of pulmonary hypertension: Continued home meds     History of hypothyroidism:   Continued home dose of T4. TSH 1.65 11/23/2021. Consults. IP CONSULT TO PHARMACY  IP CONSULT TO SOCIAL WORK  IP CONSULT TO HOME CARE NEEDS    Physical examination on discharge day. /73   Pulse 72   Temp 98.6 °F (37 °C)   Resp 16   Ht 5' 1\" (1.549 m)   Wt 150 lb 4.8 oz (68.2 kg)   SpO2 93%   BMI 28.40 kg/m²   General appearance. Alert. Looks comfortable. HEENT. Sclera clear. Moist mucus membranes. Cardiovascular. Regular rate and rhythm, normal S1, S2. No murmur. Respiratory. Not using accessory muscles. Clear to auscultation bilaterally, no wheeze. Gastrointestinal. Abdomen soft, non-tender, not distended, normal bowel sounds  Neurology. Facial symmetry. No speech deficits. Moving all extremities equally. Extremities. No edema in lower extremities. Skin. Warm, dry, normal turgor      Condition at time of discharge: stable. Medication instructions provided to patient at discharge. Medication List      START taking these medications    bisacodyl 10 MG suppository  Commonly known as: DULCOLAX  Place 1 suppository rectally daily as needed for Constipation     cefpodoxime 100 MG tablet  Commonly known as: VANTIN  Take 1 tablet by mouth 2 times daily for 5 days     magnesium hydroxide 400 MG/5ML suspension  Commonly known as: Milk of Magnesia  Take 30 mLs by mouth daily as needed for Constipation     polyethylene glycol 17 g packet  Commonly known as: GLYCOLAX  Take 17 g by mouth daily     senna 8.6 MG tablet  Commonly known as: Senokot  Take 1 tablet by mouth daily        CHANGE how you take these medications    warfarin 5 MG tablet  Commonly known as: COUMADIN  Take as directed. If you are unsure how to take this medication, talk to your nurse or doctor. Original instructions: Hold for  INR greater  Than 3.0  What changed: additional instructions        CONTINUE taking these medications    Adempas 2.5 MG Tabs  Generic drug: Riociguat  Take 2.5 mg by mouth 3 times daily     docusate sodium 100 MG capsule  Commonly known as: Colace  Take 1 capsule by mouth daily as needed for Constipation     ferrous sulfate 325 (65 Fe) MG tablet  Commonly known as: IRON 325  Take 1 tablet by mouth 2 times daily     furosemide 20 MG tablet  Commonly known as: LASIX  Take one tablet as needed every 12 hours for shortness of breath or swelling.      levothyroxine 75 MCG tablet  Commonly known as: SYNTHROID  TAKE 1 TABLET BY MOUTH EVERY DAY     lisinopril 2.5 MG tablet  Commonly known as: PRINIVIL;ZESTRIL  TAKE 1 TABLET BY MOUTH EVERY DAY     omeprazole 20 MG delayed release capsule  Commonly known as: PRILOSEC     OXYGEN     therapeutic multivitamin-minerals tablet     traMADol 50 MG tablet  Commonly known as: ULTRAM  TAKE 1 TABLET BY MOUTH EVERY 8 HOURS AS NEEDED FOR PAIN FOR UP TO 30 DAYS. vitamin C 500 MG tablet  Commonly known as: ASCORBIC ACID           Where to Get Your Medications      [id number] [address] [place] [address]    Phone: [phone]   · cefpodoxime 100 MG tablet  · magnesium hydroxide 400 MG/5ML suspension  · polyethylene glycol 17 g packet  · senna 8.6 MG tablet     Information about where to get these medications is not yet available    Ask your nurse or doctor about these medications  · bisacodyl 10 MG suppository         Discharge recommendations given to patient. Follow Up. With PCP in 1 week. Disposition. Home with home care. Activity. activity as tolerated. Diet: ADULT DIET; Regular; Low Sodium (2 gm)      Spent 35 minutes in discharge process.     Signed:  Anu Nicole MD     11/25/2021 11:37 AM

## 2021-11-26 ENCOUNTER — TELEPHONE (OUTPATIENT)
Dept: FAMILY MEDICINE CLINIC | Age: 86
End: 2021-11-26

## 2021-11-26 ENCOUNTER — CARE COORDINATION (OUTPATIENT)
Dept: CASE MANAGEMENT | Age: 86
End: 2021-11-26

## 2021-11-26 DIAGNOSIS — G93.41 METABOLIC ENCEPHALOPATHY: Primary | ICD-10-CM

## 2021-11-26 LAB — URINE CULTURE, ROUTINE: NORMAL

## 2021-11-26 PROCEDURE — 1111F DSCHRG MED/CURRENT MED MERGE: CPT | Performed by: FAMILY MEDICINE

## 2021-11-26 NOTE — CARE COORDINATION
[facility] Transitions Initial Follow Up Call    Call within 2 business days of discharge: Yes    Patient: Sharona Pérez Patient : 1934   MRN: [id number]  Reason for Admission:   Discharge Date: 21 RARS: Readmission Risk Score: 10.9 ( )      Last Discharge [facility]       Complaint Diagnosis Description Type Department Provider    21 Altered Mental Status Acute delirium . .. ED to Hosp-Admission (Discharged) (ADMITTED) Lisandra Cabezas MD; [facility] Dennie Clines. .. Non-face-to-face services provided:  Obtained and reviewed discharge summary and/or continuity of care documents  1111F completed     Transitions of Care Initial Call    Was this an external facility discharge? No Discharge Facility:     Challenges to be reviewed by the provider   Additional needs identified to be addressed with provider: No  none             Method of communication with provider : none      Advance Care Planning:   Does patient have an Advance Directive: reviewed and current. Was this a readmission? No  Patient stated reason for admission: confused  Patients top risk factors for readmission: medical condition-UTI    Care Transition Nurse (CTN) contacted the patient by telephone to perform post hospital discharge assessment. Verified name and  with patient as identifiers. Provided introduction to self, and explanation of the CTN role. CTN reviewed discharge instructions, medical action plan and red flags with patient who verbalized understanding. Patient given an opportunity to ask questions and does not have any further questions or concerns at this time. Were discharge instructions available to patient? Yes. Reviewed appropriate site of care based on symptoms and resources available to patient including: When to call 911. The patient agrees to contact the PCP office for questions related to their healthcare.      Medication reconciliation was performed with patient, who verbalizes understanding of administration of home medications. Advised obtaining a 90-day supply of all daily and as-needed medications. Reviewed and educated patient on any new and changed medications related to discharge diagnosis. Was patient discharged with a pulse oximeter? No Discussed and confirmed pulse oximeter discharge instructions and when to notify provider or seek emergency care. Pt states doing well, no issues or concerns. Denies any urinary issues, confusion. Pt states she has not heard from home care as yet, this nurse informed pt that a call will be made by this nurse to ensure that they have received a referral. .Pt states her daughter will make her f/u appts since she drives her, politely declined this nurse's assistance. Agreed to more CTC f/u calls. PC made to [facility] , they have the referral and will be seeing the pt tomorrow. CTN provided contact information. Plan for follow-up call in 5-7 days based on severity of symptoms and risk factors.   Plan for next call: self management-UTI, HC, F/U appts    Care Transitions 24 Hour Call    Do you have any ongoing symptoms?: No  Do you have a copy of your discharge instructions?: Yes  Do you have all of your prescriptions and are they filled?: Yes  Have you been contacted by a [address]?: No  Have you scheduled your follow up appointment?: No  Were you discharged with any Home Care or Post Acute Services: Yes  Post Acute Services: [address] [place] [address] you feel like you have everything you need to keep you well at home?: Yes  Care Transitions Interventions  No Identified Needs         Follow Up  Future Appointments   Date Time Provider Giovany Marie   12/23/2021  1:00  [address]       Minus BARB Huerta

## 2021-11-27 LAB
BLOOD CULTURE, ROUTINE: NORMAL
CULTURE, BLOOD 2: NORMAL

## 2021-11-29 ENCOUNTER — TELEPHONE (OUTPATIENT)
Dept: PHARMACY | Age: 86
End: 2021-11-29

## 2021-11-29 NOTE — TELEPHONE ENCOUNTER
[facility] Transitions Initial Follow Up Call    Outreach made within 2 business days of discharge: Yes    Patient: Bao Yoo Patient : 1934   MRN: [id number]  Reason for Admission: There are no discharge diagnoses documented for the most recent discharge.   Discharge Date: 21       Spoke with: ASHLY

## 2021-11-29 NOTE — TELEPHONE ENCOUNTER
pt in hospital 11/23-11/25 for delirium and UTI. was started on rocephin but switched to cefpodoxime at d/c was d/c home. INR 1.2 on admit and trended up to 1.67 at d/c. was given 10 mg of warfarin on 11/23 then returned to normal weekly dose. Called pt son and LVM asking for return call to discuss dosing warfarin and r/s to RTC since largely subtherapeutic in patient. Awaiting return call.

## 2021-12-01 ENCOUNTER — TELEPHONE (OUTPATIENT)
Dept: FAMILY MEDICINE CLINIC | Age: 86
End: 2021-12-01

## 2021-12-01 NOTE — TELEPHONE ENCOUNTER
Herson Barreto returned a call to Joyce Garcia. Patient has home care and Brittney Gonzales is going to check with them to see if they can do patient's INR at home. Brittney Gonzales will call back with this information.

## 2021-12-01 NOTE — TELEPHONE ENCOUNTER
Natalie Rivera from [facility] called requesting\"  Verbal order for OT  2x a week for 3 weeks  Contact Kaleigh-can leave a message

## 2021-12-03 ENCOUNTER — CARE COORDINATION (OUTPATIENT)
Dept: CASE MANAGEMENT | Age: 86
End: 2021-12-03

## 2021-12-03 ENCOUNTER — ANTI-COAG VISIT (OUTPATIENT)
Dept: PHARMACY | Age: 86
End: 2021-12-03

## 2021-12-03 DIAGNOSIS — Z86.711 HISTORY OF PULMONARY EMBOLISM: Primary | ICD-10-CM

## 2021-12-03 LAB — INR BLD: 1.5

## 2021-12-03 NOTE — PROGRESS NOTES
Lily Maloney called w/ INR results of 1.5, pt takes 5 mgs of warfarin daily. Was d/c'd from hospital on 11/25. No diet changes, she did just finish antibiotic ( see notes from Didi Quintanilla). [phone]     pt in hospital 11/23-11/25 for delirium and UTI. was started on rocephin but switched to cefpodoxime at d/c was d/c home. INR 1.2 on admit and trended up to 1.67 at d/c. was given 10 mg of warfarin on 11/23 then returned to normal weekly dose. Called and confirmed with Lily Maloney that pt has been taking 5 mg daily since discharge on 11/25. Pt finished ABX. Called Lily Maloney back. Instructed to have patient take 7.5 mg daily on 12/3-12/5 since INR was 1.5 which is below goal range of 2-3. Will check INR again in 12/6.       For Pharmacy Admin Tracking Only     Intervention Detail: Dose Adjustment: 1, reason: Therapy Optimization   Total # of Interventions Recommended: 1   Total # of Interventions Accepted: 1   Time Spent (min): Maria Elena 5, PharmD    PGY1 Pharmacy Resident   [id number]

## 2021-12-03 NOTE — CARE COORDINATION
[facility] Transitions Follow Up Call    12/3/2021    Patient: Georgie Meek  Patient : 1934   MRN: [id number]  Reason for Admission:   Discharge Date: 21 RARS: Readmission Risk Score: 10.9 ( )         Follow up call attempted, could not leave contact info, no vm      Follow Up  Future Appointments   Date Time Provider Giovany Marie   2021  1:00 PM [facility] ANTICOAGULATION CLINIC [facility]   2021  5:00 PM Kathryn Dykes MD [facility]   2022  2:45 PM Anay Reis MD [facility] Card[facility]       Kamila London RN

## 2021-12-03 NOTE — TELEPHONE ENCOUNTER
Taylor Courser ([phone]) from [place] [address] called and asked if we wanted them to get the INR today- gave order under Tunde Bergmanh. They will call back within the hour to give us the INR.     Mary Trotter, SerenaD, [facility]

## 2021-12-06 ENCOUNTER — ANTI-COAG VISIT (OUTPATIENT)
Dept: PHARMACY | Age: 86
End: 2021-12-06

## 2021-12-06 DIAGNOSIS — Z86.711 HISTORY OF PULMONARY EMBOLISM: Primary | ICD-10-CM

## 2021-12-06 LAB — INR BLD: 1.7

## 2021-12-06 NOTE — PROGRESS NOTES
Joel Norton from [address] [place] called w/ INR result of 1.7. Pt took 7.5 mgs of warfarin on Fri/Sat, missed her dose on Sunday. No med or diet changes. States there is a discrepancy w/ her warfarin dose she needs to s/w a pharmacist about. [phone]       [facility]. HHN states when she checked pillbox today pt was taking 10 mg on Mon and 5 mg all other days of the week. Advised for patient to take 7.5 mg tonight and tomorrow and 5 mg on Wed. Recheck INR on Thurs 12/9.       For Pharmacy Admin Tracking Only     Intervention Detail: Dose Adjustment: 1, reason: Therapy Optimization   Total # of Interventions Recommended: 1   Total # of Interventions Accepted: 1   Time Spent (min): 15

## 2021-12-08 ENCOUNTER — CARE COORDINATION (OUTPATIENT)
Dept: CASE MANAGEMENT | Age: 86
End: 2021-12-08

## 2021-12-08 NOTE — CARE COORDINATION
[facility] Transitions Follow Up Call    2021    Patient: Ml Waller  Patient : 1934   MRN: [id number]  Reason for Admission: UTI  Discharge Date: 21 RARS: Readmission Risk Score: 10.9 ( )         Spoke with: NAYELI [facility] Transitions Subsequent and Final Call    Subsequent and Final Calls  Do you have any ongoing symptoms?: No  Have your medications changed?: No  Do you have any questions related to your medications?: No  Do you currently have any active services?: Yes  Are you currently active with any services?: Home Health  Do you have any needs or concerns that I can assist you with?: No  Care Transitions Interventions  Other Interventions:       States he's sitting with Shruthi Fernández now and they are waiting for therapy to arrive. States she's doing well and denies any ongoing symptoms or concerns. He states they have her medications and everything they need at home and denies any questions at this time.      Follow Up  Future Appointments   Date Time Provider Giovany Marie   2021  5:15 PM [facility] ANTICOAGULATION CLINIC [facility] Ambrose SARKAR   2021  5:00 PM MD NII [facility] [place] [facility]   2022  2:45 PM Maylin Urias MD FF Cardio [facility]       Serjio Ibarra

## 2021-12-09 ENCOUNTER — ANTI-COAG VISIT (OUTPATIENT)
Dept: PHARMACY | Age: 86
End: 2021-12-09

## 2021-12-09 DIAGNOSIS — M41.25 OTHER IDIOPATHIC SCOLIOSIS, THORACOLUMBAR REGION: ICD-10-CM

## 2021-12-09 DIAGNOSIS — Z86.711 HISTORY OF PULMONARY EMBOLISM: Primary | ICD-10-CM

## 2021-12-09 LAB — INR BLD: 1.8

## 2021-12-09 RX ORDER — TRAMADOL HYDROCHLORIDE 50 MG/1
TABLET ORAL
Qty: 90 TABLET | Refills: 0 | Status: SHIPPED | OUTPATIENT
Start: 2021-12-09 | End: 2022-01-08

## 2021-12-09 RX ORDER — LEVOTHYROXINE SODIUM 0.07 MG/1
TABLET ORAL
Qty: 90 TABLET | Refills: 0 | Status: SHIPPED | OUTPATIENT
Start: 2021-12-09

## 2021-12-09 NOTE — PROGRESS NOTES
Mylene Santamaria w/ [facility] called w/ INR results of 1.8, pt took 7.5 mgs of warfarin on Mon ,Tues and 5 mgs on Wed. No med or diet changes. [phone]     Returned call to [facility] and [facility]. Instructed for patient to take 7.5 mg tonight and tomorrow and 5 mg on Sat and Sun. Recheck INR on Mon 12/13.  Any questions or concerns return call to clinic [phone]    For Pharmacy Admin Tracking Only     Intervention Detail: Dose Adjustment: 1, reason: Therapy Optimization   Total # of Interventions Recommended: 1   Total # of Interventions Accepted: 1   Time Spent (min): 15

## 2021-12-13 ENCOUNTER — ANTI-COAG VISIT (OUTPATIENT)
Dept: PHARMACY | Age: 86
End: 2021-12-13

## 2021-12-13 DIAGNOSIS — Z86.711 HISTORY OF PULMONARY EMBOLISM: Primary | ICD-10-CM

## 2021-12-13 LAB — INR BLD: 1.2

## 2021-12-13 NOTE — PROGRESS NOTES
INR 1.2 Patient was to take 7.5mg of warfarin Thur/Fri and 5mg on Sat/Sun.  Patient missed one dose.  Please call [facility] [phone] with warfarin dosing and order for next INR check. Returned call to [facility]. She thinks patient missed Thurs and Sat (one on Thurs and Sat doses) Instructed for patient to take 7.5 mg daily Recheck INR on Thurs 12/16.  She states that family is looking in to placing family in to assisted living facility    For Pharmacy Admin Tracking Only     Intervention Detail: Dose Adjustment: 1, reason: Therapy Optimization   Total # of Interventions Recommended: 1   Total # of Interventions Accepted: 1   Time Spent (min): 15

## 2021-12-15 ENCOUNTER — CARE COORDINATION (OUTPATIENT)
Dept: CASE MANAGEMENT | Age: 86
End: 2021-12-15

## 2021-12-15 NOTE — CARE COORDINATION
[facility] Transitions Follow Up Call    12/15/2021    Patient: Bao Yoo  Patient : 1934   MRN: [id number]  Reason for Admission:   Discharge Date: 21 RARS: Readmission Risk Score: 10.9 ( )       Follow up call attempted, left contact info on vm      Follow Up  Future Appointments   Date Time Provider Giovany Marie   2021  5:15 PM [facility] ANTICOAGULATION CLINIC [facility]   2021  5:00 PM Andra Grullon MD [address] [place] [address]   2022  2:45 PM Rosalind Teague MD [facility]       Connor Fletcher RN

## 2021-12-16 ENCOUNTER — ANTI-COAG VISIT (OUTPATIENT)
Dept: PHARMACY | Age: 86
End: 2021-12-16

## 2021-12-16 DIAGNOSIS — Z86.711 HISTORY OF PULMONARY EMBOLISM: Primary | ICD-10-CM

## 2021-12-16 LAB — INR BLD: 1.8

## 2021-12-16 NOTE — PROGRESS NOTES
Sandra Orona w/ [facility]  LVM w/ pts INR of 1.8 , no other info on message .    [phone]

## 2021-12-16 NOTE — PROGRESS NOTES
Returned call to [facility]. Verified that patient took 7.5 mg daily since Mon. Switched dosing warfarin to morning since someone is there. Did not take warfarin this am. Instructed for patient to take 5 mg tonight, tomorrow and 7.5 mg on Sat and Sun. Recheck INR on Mon 12/20.        For Pharmacy Admin Tracking Only     Intervention Detail: Dose Adjustment: 1, reason: Therapy Optimization   Total # of Interventions Recommended: 1   Total # of Interventions Accepted: 1   Time Spent (min): 15

## 2021-12-17 ENCOUNTER — CARE COORDINATION (OUTPATIENT)
Dept: CASE MANAGEMENT | Age: 86
End: 2021-12-17

## 2021-12-17 NOTE — CARE COORDINATION
[facility] Transitions Follow Up Call    2021    Patient: [address]  Patient : 1934   MRN: [id number]  Reason for Admission:   Discharge Date: 21 RARS: Readmission Risk Score: 10.9 ( )    2nd and final attempt at a Follow up call, left contact info on vm    Follow Up  Future Appointments   Date Time Provider Giovany Marie   2021  5:15 PM [facility] ANTICOAGULATION CLINIC [facility]   2021  5:00 PM Gilles Antoine MD [facility]   2022  2:45 PM Uziel Ceballos MD [facility] Cardio [facility]       Noman Islas RN

## 2021-12-20 ENCOUNTER — ANTI-COAG VISIT (OUTPATIENT)
Dept: PHARMACY | Age: 86
End: 2021-12-20

## 2021-12-20 DIAGNOSIS — Z86.711 HISTORY OF PULMONARY EMBOLISM: Primary | ICD-10-CM

## 2021-12-20 LAB — INR BLD: 2

## 2021-12-20 NOTE — PROGRESS NOTES
Fabian Bone w/ [facility] lvm w/ pts INR results of 2.0, would like a pharmacist to call w/ dosing instructions ( she only left INR results on message) . [phone]     Returned call and LVM. For INR 2.0, mirrored dosing received in last 7 days as patient is therapeutic. Instructed patient to take 5mg on Mon and Fri and 7.5mg all other days.     Next INR: 12/27      For Pharmacy Admin Tracking Only     Intervention Detail: Dose Adjustment: 1, reason: Therapy Optimization   Total # of Interventions Recommended: 1   Total # of Interventions Accepted: 1   Time Spent (min): 15

## 2021-12-27 ENCOUNTER — TELEPHONE (OUTPATIENT)
Dept: PHARMACY | Age: 86
End: 2021-12-27

## 2021-12-28 ENCOUNTER — VIRTUAL VISIT (OUTPATIENT)
Dept: FAMILY MEDICINE CLINIC | Age: 86
End: 2021-12-28
Payer: MEDICARE

## 2021-12-28 DIAGNOSIS — I27.20 PULMONARY HYPERTENSION (HCC): ICD-10-CM

## 2021-12-28 DIAGNOSIS — M41.25 OTHER IDIOPATHIC SCOLIOSIS, THORACOLUMBAR REGION: ICD-10-CM

## 2021-12-28 DIAGNOSIS — Z09 HOSPITAL DISCHARGE FOLLOW-UP: Primary | ICD-10-CM

## 2021-12-28 PROCEDURE — 99213 OFFICE O/P EST LOW 20 MIN: CPT | Performed by: FAMILY MEDICINE

## 2021-12-28 ASSESSMENT — ENCOUNTER SYMPTOMS
GASTROINTESTINAL NEGATIVE: 1
BACK PAIN: 1
RESPIRATORY NEGATIVE: 1

## 2021-12-28 NOTE — PROGRESS NOTES
2021    TELEHEALTH EVALUATION -- Audio/Visual (During ELTDJ-19 public health emergency)    HPI:    Marion Rodriguez (:  1934) has requested an audio/video evaluation for the following concern(s):    Patient was hospitalized around [facility] due to mental status changes and a fall. This was found to be due to a UTI. She was able to return to her home after a brief hospitalization with home health nursing and physical and Occupational Therapy. This is all ended after approximately 4 weeks. She states she has no health issues now and feels as well as she usually does. She does have chronic back pain and is on oxygen but denies shortness of breath with rest.  Her daughter was on the visit with her and states she will be moving in approximately 6 weeks to [facility] leaving her two-story house where she has lived for the last 64 years however the house is becoming too much to manage as well as the grounds. The patient is generally okay with this move and will live independently in an apartment with meals prepared for her. She states she is back to her usual state of health. Review of Systems   Constitutional: Negative. HENT: Negative. Respiratory: Negative. Patient is on 24/7 oxygen   Cardiovascular: Negative. Gastrointestinal: Negative. Endocrine: Negative. Genitourinary: Negative. Musculoskeletal: Positive for back pain. Neurological: Negative. Psychiatric/Behavioral: Negative. Prior to Visit Medications    Medication Sig Taking? Authorizing Provider   levothyroxine (SYNTHROID) 75 MCG tablet TAKE 1 TABLET BY MOUTH EVERY DAY Yes Anahi Quevedo MD   traMADol (ULTRAM) 50 MG tablet TAKE 1 TABLET BY MOUTH EVERY 8 HOURS AS NEEDED FOR PAIN FOR UP TO 30 DAYS.  Yes Anahi Quevedo MD   lisinopril (PRINIVIL;ZESTRIL) 2.5 MG tablet TAKE 1 TABLET BY MOUTH EVERY DAY Yes Anahi Quevedo MD   Riociguat (ADEMPAS) 2.5 MG TABS Take 2.5 mg by mouth 3 times daily Yes Rajeev Trejo MD   furosemide (LASIX) 20 MG tablet Take one tablet as needed every 12 hours for shortness of breath or swelling.  Yes Samir Duke MD   ferrous sulfate (IRON 325) 325 (65 Fe) MG tablet Take 1 tablet by mouth 2 times daily Yes Anay Reis MD   OXYGEN Inhale 2 L into the lungs continuous Yes Historical Provider, MD   warfarin (COUMADIN) 5 MG tablet Hold for  INR greater  Than 3.0  Patient taking differently: Hold for  INR greater  Than 3.0  Managed by [facility] Yes Anne Gomez MD   omeprazole (PRILOSEC) 20 MG delayed release capsule Take 20 mg by mouth daily Yes Historical Provider, MD   docusate sodium (COLACE) 100 MG capsule Take 1 capsule by mouth daily as needed for Constipation Yes Chitra Adams MD   Multiple Vitamins-Minerals (THERAPEUTIC MULTIVITAMIN-MINERALS) tablet Take 1 tablet by mouth daily Yes Historical Provider, MD   Ascorbic Acid (VITAMIN C) 500 MG tablet Take 500 mg by mouth daily Yes Historical Provider, MD       Social History     Tobacco Use    Smoking status: Former Smoker     Packs/day: 0.25     Years: 15.00     Pack years: 3.75     Types: Cigarettes    Smokeless tobacco: Former User     Quit date: 12/27/1965   Vaping Use    Vaping Use: Never used   Substance Use Topics    Alcohol use: No    Drug use: No        Allergies   Allergen Reactions    Naprosyn [Naproxen] Nausea Only     ABDOMINAL PAIN   ,   Past Medical History:   Diagnosis Date    Arthritis     Hyperlipidemia     Hypertension     Hypothyroidism     Lumbar herniated disc     Movement disorder     scoliosis    PAH (pulmonary arterial hypertension) with portal hypertension ([address]     Pulmonary emboli ([address] 12/2014    Scoliosis     Scoliosis     Thyroid disease     hypothyroid   ,   Past Surgical History:   Procedure Laterality Date    COLONOSCOPY  7/08    EXCISION / BIOPSY SKIN LESION OF ARM Left 9/18/2019    EXC[facility]  EXAMINED]  Vital Signs: (As obtained by patient/caregiver or practitioner observation)    Respiratory rate-normal.  Patient had a nasal cannula for oxygen    Constitutional: [x] Appears well-developed and well-nourished [x] No apparent distress      [] Abnormal-   Mental status  [x] Alert and awake  [x] Oriented to person/place/time [x]Able to follow commands      Eyes:  EOM    [x]  Normal  [] Abnormal-  Sclera  [x]  Normal  [] Abnormal -         Discharge [x]  None visible  [] Abnormal -    HENT:   [x] Normocephalic, atraumatic. [] Abnormal   [] Mouth/Throat: Mucous membranes are moist.     External Ears [] Normal  [] Abnormal-   Not visualized  Neck: [x] No visualized mass     Pulmonary/Chest: [x] Respiratory effort normal.  [] No visualized signs of difficulty breathing or respiratory distress        [] Abnormal-      Musculoskeletal:   [] Normal gait with no signs of ataxia         [x] Normal range of motion of neck        [] Abnormal-       Neurological:        [x] No Facial Asymmetry (Cranial nerve 7 motor function) (limited exam to video visit)          [x] No gaze palsy        [] Abnormal-         Skin:        [x] No significant exanthematous lesions or discoloration noted on facial skin         [] Abnormal-            Psychiatric:       [x] Normal Affect [x] No Hallucinations        [] Abnormal-     Other pertinent observable physical exam findings-     ASSESSMENT/PLAN:  1. Hospital discharge follow-up  Resolved UTI and mental status changes    2. Pulmonary hypertension (HCC)  Generally stable on chronic O2 therapy    3. Other idiopathic scoliosis, thoracolumbar region  OARRS report reviewed and no inconsistencies noted   The patient understands the risks of dependency/addiction with tramadol and will take as little as possible and discontinue as soon as possible         Return in about 6 months (around 6/28/2022), or if symptoms worsen or fail to improve.     Leila Bah is a 80 y.o. female being evaluated by a Virtual Visit (video visit) encounter to address concerns as mentioned above. A caregiver was present when appropriate. Due to this being a TeleHealth encounter (During [place] [address] public [facility] emergency), evaluation of the following organ systems was limited: Vitals/Constitutional/EENT/Resp/CV/GI//MS/Neuro/Skin/Heme-Lymph-Imm. Pursuant to the emergency declaration under the [address] [place] [address] and the [employer] Act, this Virtual Visit was conducted with patient's (and/or legal guardian's) consent, to reduce the patient's risk of exposure to COVID-19 and provide necessary medical care. The patient (and/or legal guardian) has also been advised to contact this office for worsening conditions or problems, and seek emergency medical treatment and/or call 911 if deemed necessary. Patient identification was verified at the start of the visit: Yes    Total time spent on this encounter: 20 minutes    Services were provided through a video synchronous discussion virtually to substitute for in-person clinic visit. Patient and provider were located at their individual homes. --Gloria Dumont MD on 12/28/2021 at 5:45 PM    An electronic signature was used to authenticate this note.

## 2021-12-30 ENCOUNTER — ANTI-COAG VISIT (OUTPATIENT)
Dept: PHARMACY | Age: 86
End: 2021-12-30
Payer: MEDICARE

## 2021-12-30 DIAGNOSIS — Z86.711 HISTORY OF PULMONARY EMBOLISM: Primary | ICD-10-CM

## 2021-12-30 LAB — INTERNATIONAL NORMALIZATION RATIO, POC: 1.7

## 2021-12-30 PROCEDURE — 99211 OFF/OP EST MAY X REQ PHY/QHP: CPT

## 2021-12-30 PROCEDURE — 85610 PROTHROMBIN TIME: CPT

## 2021-12-30 NOTE — PROGRESS NOTES
Ms. Paras Martines is a 80 y.o. 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 Took Xarelto for the 3 years after her first PE. Then developed \"several small clots\" as it was described. She was then switched to warfarin 5mg as of 12/3/18. Pertinent PMH: HTN, Hx of PE,     Patient Reported Findings:  Yes     No  [x]   []       Patient & son verifies current dosing regimen as listed home nurse that takes care of medications    Daughter accompanied patient today- doesn't do medications, not sure of dosing since has caretaker. She will check when she gets home to confirm dose. --> pt confirms dose    []   [x]       S/S bleeding/bruising/swelling/SOB- denies ---> fell 5/9, hit head, spoke with doctor because refused ER so went to doc and was assessed- fine---> denies   []   [x]       Blood in urine or stool- denies   []   [x]       Procedures scheduled in the future at this time   [x]   []       Missed Dose - May have missed dose 12/28  []   [x]       Extra Dose- Denies   []   [x]       Change in medications started taking iron and stool softener---> no changes---> abx for UTI, doesn't know name, done with it for >1 week---> no changes   []   [x]       Change in health/diet/appetite denies changes, no NVD --> no changes no NVD   []   [x]       Change in alcohol use  []   [x]       Change in activity  []   [x]       Hospital admission  []   [x]       Emergency department visit  []   [x]       Other complaints     Clinical Outcomes:  Yes     No  []   [x]       Major bleeding event  []   [x]       Thromboembolic event    Duration of warfarin Therapy: indefinitely  INR Range:  2.0-3.0     Presents with daughter today. INR is 1.7 today   First appointment back from [facility]  Unclear what exactly patient had over the last week d/t mix-up with home care. Will retry home care dosing for one week and make adjustments at next appointment.   Continue weekly dose of 5mg on Mon and Fri and 7.5 mg all other days of the week. Encouraged to maintain a consistency of vegetables/salads.    Recheck INR in 1 week, 1/7    Patient consent signed 11/11/21    Referring is Dr. Daniel Service   INR (no units)   Date Value   12/20/2021 2.00   12/16/2021 1.80   12/13/2021 1.20   12/09/2021 1.80   For Pharmacy Admin Tracking Only     Intervention Detail: Dose Adjustment: 1, reason: Therapy Optimization   Total # of Interventions Recommended: 1   Total # of Interventions Accepted: 1   Time Spent (min): [address] [facility], [address] [place] [address], PharmD

## 2022-01-02 ENCOUNTER — APPOINTMENT (OUTPATIENT)
Dept: GENERAL RADIOLOGY | Age: 87
End: 2022-01-02
Payer: MEDICARE

## 2022-01-02 ENCOUNTER — HOSPITAL ENCOUNTER (EMERGENCY)
Age: 87
Discharge: HOME | End: 2022-01-02
Attending: EMERGENCY MEDICINE
Payer: MEDICARE

## 2022-01-02 VITALS
DIASTOLIC BLOOD PRESSURE: 78 MMHG | WEIGHT: 150 LBS | OXYGEN SATURATION: 99 % | RESPIRATION RATE: 13 BRPM | BODY MASS INDEX: 29.45 KG/M2 | HEART RATE: 66 BPM | TEMPERATURE: 98.7 F | HEIGHT: 60 IN | SYSTOLIC BLOOD PRESSURE: 147 MMHG

## 2022-01-02 DIAGNOSIS — R42 DIZZINESS: Primary | ICD-10-CM

## 2022-01-02 LAB
A/G RATIO: 1.4 (ref 1.1–2.2)
ALBUMIN SERPL-MCNC: 3.8 G/DL (ref 3.4–5)
ALP BLD-CCNC: 89 U/L (ref 40–129)
ALT SERPL-CCNC: 15 U/L (ref 10–40)
AMYLASE: 82 U/L (ref 25–115)
ANION GAP SERPL CALCULATED.3IONS-SCNC: 8 MMOL/L (ref 3–16)
APTT: 72.7 SEC (ref 26.2–38.6)
AST SERPL-CCNC: 19 U/L (ref 15–37)
BASE EXCESS VENOUS: 7.7 MMOL/L (ref -3–3)
BASOPHILS ABSOLUTE: 0 K/UL (ref 0–0.2)
BASOPHILS RELATIVE PERCENT: 0.4 %
BILIRUB SERPL-MCNC: <0.2 MG/DL (ref 0–1)
BILIRUBIN URINE: NEGATIVE
BLOOD, URINE: NEGATIVE
BUN BLDV-MCNC: 17 MG/DL (ref 7–20)
CALCIUM SERPL-MCNC: 9.9 MG/DL (ref 8.3–10.6)
CARBOXYHEMOGLOBIN: 4.9 % (ref 0–1.5)
CHLORIDE BLD-SCNC: 100 MMOL/L (ref 99–110)
CLARITY: CLEAR
CO2: 30 MMOL/L (ref 21–32)
COLOR: YELLOW
CREAT SERPL-MCNC: 0.6 MG/DL (ref 0.6–1.2)
EOSINOPHILS ABSOLUTE: 0.1 K/UL (ref 0–0.6)
EOSINOPHILS RELATIVE PERCENT: 0.8 %
GFR AFRICAN AMERICAN: >60
GFR NON-AFRICAN AMERICAN: >60
GLUCOSE BLD-MCNC: 112 MG/DL (ref 70–99)
GLUCOSE URINE: NEGATIVE MG/DL
HCO3 VENOUS: 31.9 MMOL/L (ref 23–29)
HCT VFR BLD CALC: 41.4 % (ref 36–48)
HEMOGLOBIN: 13.6 G/DL (ref 12–16)
INR BLD: 1.9 (ref 0.88–1.12)
KETONES, URINE: ABNORMAL MG/DL
LACTIC ACID, SEPSIS: 0.9 MMOL/L (ref 0.4–1.9)
LEUKOCYTE ESTERASE, URINE: NEGATIVE
LIPASE: 39 U/L (ref 13–60)
LYMPHOCYTES ABSOLUTE: 0.7 K/UL (ref 1–5.1)
LYMPHOCYTES RELATIVE PERCENT: 10.2 %
MAGNESIUM: 1.9 MG/DL (ref 1.8–2.4)
MCH RBC QN AUTO: 28.4 PG (ref 26–34)
MCHC RBC AUTO-ENTMCNC: 32.8 G/DL (ref 31–36)
MCV RBC AUTO: 86.6 FL (ref 80–100)
METHEMOGLOBIN VENOUS: 0 %
MICROSCOPIC EXAMINATION: ABNORMAL
MONOCYTES ABSOLUTE: 0.5 K/UL (ref 0–1.3)
MONOCYTES RELATIVE PERCENT: 6.7 %
NEUTROPHILS ABSOLUTE: 5.5 K/UL (ref 1.7–7.7)
NEUTROPHILS RELATIVE PERCENT: 81.9 %
NITRITE, URINE: NEGATIVE
O2 CONTENT, VEN: 19 VOL %
O2 SAT, VEN: 100 %
O2 THERAPY: ABNORMAL
PCO2, VEN: 42.3 MMHG (ref 40–50)
PDW BLD-RTO: 15.5 % (ref 12.4–15.4)
PH UA: 6 (ref 5–8)
PH VENOUS: 7.49 (ref 7.35–7.45)
PLATELET # BLD: 335 K/UL (ref 135–450)
PMV BLD AUTO: 8.4 FL (ref 5–10.5)
PO2, VEN: 125 MMHG (ref 25–40)
POTASSIUM REFLEX MAGNESIUM: 3.5 MMOL/L (ref 3.5–5.1)
PRO-BNP: 196 PG/ML (ref 0–449)
PROTEIN UA: NEGATIVE MG/DL
PROTHROMBIN TIME: 22.1 SEC (ref 9.9–12.7)
RBC # BLD: 4.78 M/UL (ref 4–5.2)
SODIUM BLD-SCNC: 138 MMOL/L (ref 136–145)
SPECIFIC GRAVITY UA: 1.01 (ref 1–1.03)
TCO2 CALC VENOUS: 75 MMOL/L
TOTAL PROTEIN: 6.6 G/DL (ref 6.4–8.2)
TROPONIN: <0.01 NG/ML
URINE TYPE: ABNORMAL
UROBILINOGEN, URINE: 0.2 E.U./DL
WBC # BLD: 6.7 K/UL (ref 4–11)

## 2022-01-02 PROCEDURE — 96360 HYDRATION IV INFUSION INIT: CPT

## 2022-01-02 PROCEDURE — 99284 EMERGENCY DEPT VISIT MOD MDM: CPT

## 2022-01-02 PROCEDURE — 83690 ASSAY OF LIPASE: CPT

## 2022-01-02 PROCEDURE — 80053 COMPREHEN METABOLIC PANEL: CPT

## 2022-01-02 PROCEDURE — 83880 ASSAY OF NATRIURETIC PEPTIDE: CPT

## 2022-01-02 PROCEDURE — 87186 SC STD MICRODIL/AGAR DIL: CPT

## 2022-01-02 PROCEDURE — 81003 URINALYSIS AUTO W/O SCOPE: CPT

## 2022-01-02 PROCEDURE — 71045 X-RAY EXAM CHEST 1 VIEW: CPT

## 2022-01-02 PROCEDURE — 85730 THROMBOPLASTIN TIME PARTIAL: CPT

## 2022-01-02 PROCEDURE — 93005 ELECTROCARDIOGRAM TRACING: CPT | Performed by: EMERGENCY MEDICINE

## 2022-01-02 PROCEDURE — 85025 COMPLETE CBC W/AUTO DIFF WBC: CPT

## 2022-01-02 PROCEDURE — 82803 BLOOD GASES ANY COMBINATION: CPT

## 2022-01-02 PROCEDURE — 2580000003 HC RX 258: Performed by: EMERGENCY MEDICINE

## 2022-01-02 PROCEDURE — 87086 URINE CULTURE/COLONY COUNT: CPT

## 2022-01-02 PROCEDURE — 85610 PROTHROMBIN TIME: CPT

## 2022-01-02 PROCEDURE — 96361 HYDRATE IV INFUSION ADD-ON: CPT

## 2022-01-02 PROCEDURE — 83735 ASSAY OF MAGNESIUM: CPT

## 2022-01-02 PROCEDURE — 83605 ASSAY OF LACTIC ACID: CPT

## 2022-01-02 PROCEDURE — 84484 ASSAY OF TROPONIN QUANT: CPT

## 2022-01-02 PROCEDURE — 87077 CULTURE AEROBIC IDENTIFY: CPT

## 2022-01-02 PROCEDURE — 82150 ASSAY OF AMYLASE: CPT

## 2022-01-02 RX ORDER — SODIUM CHLORIDE, SODIUM LACTATE, POTASSIUM CHLORIDE, AND CALCIUM CHLORIDE .6; .31; .03; .02 G/100ML; G/100ML; G/100ML; G/100ML
1000 INJECTION, SOLUTION INTRAVENOUS ONCE
Status: DISCONTINUED | OUTPATIENT
Start: 2022-01-02 | End: 2022-01-02 | Stop reason: HOSPADM

## 2022-01-02 RX ORDER — SODIUM CHLORIDE, SODIUM LACTATE, POTASSIUM CHLORIDE, CALCIUM CHLORIDE 600; 310; 30; 20 MG/100ML; MG/100ML; MG/100ML; MG/100ML
1000 INJECTION, SOLUTION INTRAVENOUS ONCE
Status: COMPLETED | OUTPATIENT
Start: 2022-01-02 | End: 2022-01-02

## 2022-01-02 RX ADMIN — SODIUM CHLORIDE, POTASSIUM CHLORIDE, SODIUM LACTATE AND CALCIUM CHLORIDE 1000 ML: 600; 310; 30; 20 INJECTION, SOLUTION INTRAVENOUS at 19:39

## 2022-01-02 NOTE — ED PROVIDER NOTES
[address] [facility]        Pt Name: Cee Alvarenga  MRN: [id number]  [place] 1934  Date of evaluation: 1/2/2022  Provider: Lyndon Cruz MD  PCP: Tarah Hurley MD    This patient was seen and evaluated by the attending physician Lyndon Cruz MD.      [facility]       Chief Complaint   Patient presents with    Dizziness     Pt brought in from home per EMS d/t dizziness times one hour. VSS . Alert and oriented times 4       HISTORY OF PRESENT ILLNESS   (Location/Symptom, Timing/Onset, Context/Setting, Quality, Duration, Modifying Factors, Severity)  Note limiting factors. Cee Alvarenga is a 80 y.o. female today with a chief complaint of feeling a bit lightheaded. History of recent mission for UTIs and has some prior history pulmonary embolisms as well as pulmonary hypertension hypertension dyslipidemia. Felt fine this morning when walking around felt lightheaded fatigued and like she might pass out. No dizziness no vertigo no nausea or diarrhea no palpitations or dyspnea no chest congestion no rhinorrhea no fevers chills sweats no recalled sick contacts. Nursing Notes were all reviewed and agreed with or any disagreements were addressed  in the HPI. REVIEW OF SYSTEMS    (2-9 systems for level 4, 10 or more for level 5)     Review of Systems    Positives and Pertinent negatives as per HPI. Except as noted abovein the ROS, all other systems were reviewed and negative.        PAST MEDICAL HISTORY     Past Medical History:   Diagnosis Date    Arthritis     Hyperlipidemia     Hypertension     Hypothyroidism     Lumbar herniated disc     Movement disorder     scoliosis    PAH (pulmonary arterial hypertension) with portal hypertension (HCC)     Pulmonary emboli ([facility] [address] 12/2014    Scoliosis     Scoliosis     Thyroid disease     hypothyroid         SURGICAL HISTORY     Past Surgical History:   Procedure Laterality Date    COLONOSCOPY  7/08    EXCISION / BIOPSY SKIN LESION OF ARM Left 9/18/2019    EXCISION LEFT FOREARM MASS performed by Kayla Pérez MD at [address]      both knees    NECK SURGERY Right 9/18/2019    EXCISION OF RIGHT NECK MASS performed by Kayla Pérez MD at [address] [facility]       Previous Medications    ASCORBIC ACID (VITAMIN C) 500 MG TABLET    Take 500 mg by mouth daily    DOCUSATE SODIUM (COLACE) 100 MG CAPSULE    Take 1 capsule by mouth daily as needed for Constipation    FERROUS SULFATE (IRON 325) 325 (65 FE) MG TABLET    Take 1 tablet by mouth 2 times daily    FUROSEMIDE (LASIX) 20 MG TABLET    Take one tablet as needed every 12 hours for shortness of breath or swelling. LEVOTHYROXINE (SYNTHROID) 75 MCG TABLET    TAKE 1 TABLET BY MOUTH EVERY DAY    LISINOPRIL (PRINIVIL;ZESTRIL) 2.5 MG TABLET    TAKE 1 TABLET BY MOUTH EVERY DAY    MULTIPLE VITAMINS-MINERALS (THERAPEUTIC MULTIVITAMIN-MINERALS) TABLET    Take 1 tablet by mouth daily    OMEPRAZOLE (PRILOSEC) 20 MG DELAYED RELEASE CAPSULE    Take 20 mg by mouth daily    OXYGEN    Inhale 2 L into the lungs continuous    RIOCIGUAT (ADEMPAS) 2.5 MG TABS    Take 2.5 mg by mouth 3 times daily    TRAMADOL (ULTRAM) 50 MG TABLET    TAKE 1 TABLET BY MOUTH EVERY 8 HOURS AS NEEDED FOR PAIN FOR UP TO 30 DAYS. WARFARIN (COUMADIN) 5 MG TABLET    Hold for  INR greater  Than 3.0         ALLERGIES     Naprosyn [naproxen]    FAMILYHISTORY       Family History   Problem Relation Age of Onset    High Cholesterol Mother     High Cholesterol Father     Cancer Brother           SOCIAL HISTORY       Social History     Socioeconomic History    Marital status:       Spouse name: None    Number of children: 6    Years of education: 12    Highest education level: None   Occupational History    None   Tobacco Use    Smoking status: Former Smoker     Packs/day: 0.25     Years: 15.00     Pack years: 3.75     Types: Cigarettes    Smokeless tobacco: Former User     Quit date: 12/27/1965   Vaping Use    Vaping Use: Never used   Substance and Sexual Activity    Alcohol use: No    Drug use: No    Sexual activity: Not Currently   Other Topics Concern    None   Social History Narrative    None     Social Determinants of Health     Financial Resource Strain: Low Risk     Difficulty of Paying Living Expenses: Not hard at all   Food Insecurity: No Food Insecurity    Worried About Running Out of Food in the Last Year: Never true    Ada of Food in the Last Year: Never true   Transportation Needs:     Lack of Transportation (Medical): Not on file    Lack of Transportation (Non-Medical):  Not on file   Physical Activity:     Days of Exercise per Week: Not on file    Minutes of Exercise per Session: Not on file   Stress:     Feeling of Stress : Not on file   Social Connections:     Frequency of Communication with Friends and Family: Not on file    Frequency of Social Gatherings with Friends and Family: Not on file    Attends Yazidism Services: Not on file    Active Member of [address] [place] [address] or Organizations: Not on file    Attends Club or Organization Meetings: Not on file    Marital Status: Not on file   Intimate Partner Violence:     Fear of Current or Ex-Partner: Not on file    Emotionally Abused: Not on file    Physically Abused: Not on file    Sexually Abused: Not on file   Housing Stability:     Unable to Pay for Housing in the Last Year: Not on file    Number of [place] in the Last Year: Not on file    Unstable Housing in the Last Year: Not on file       SCREENINGS             PHYSICAL EXAM    (up to 7 for level 4, 8 or more for level 5)     ED Triage Vitals [01/02/22 1633]   BP Temp Temp Source Pulse Resp SpO2 Height Weight   122/64 98.7 °F (37.1 °C) Oral 97 20 94 % 5' (1.524 m) 150 lb (68 kg)       Physical Exam    General Appearance:  Alert, cooperative, no distress, appears stated age.   Head:  Normocephalic, without obvious abnormality, atraumatic. Eyes:  conjunctiva/corneas clear, EOM's intact. Sclera anicteric. ENT: Mucous membranes moist.   Neck: Supple, symmetrical, trachea midline, no adenopathy. No jugular venous distention. Lungs:   No Respiratory Distress. Chest Wall:  Atrauamtic   Heart:  RRR; audible 2/6 early systolic murmur with a midsystolic click,   Abdomen:   Soft, NT< ND   Extremities:  Full range of motion. Pulses: Symmetric x4   Skin:  No rashes or lesions to exposed skin. Neurologic: Alert and oriented X 3. Motor grossly normal.  Speech clear.           DIAGNOSTIC RESULTS   LABS:    Labs Reviewed   CBC WITH AUTO DIFFERENTIAL - Abnormal; Notable for the following components:       Result Value    RDW 15.5 (*)     Lymphocytes Absolute 0.7 (*)     All other components within normal limits    Narrative:     Performed at:  [facility] [address], [facility], [address]   Phone [phone]   COMPREHENSIVE METABOLIC PANEL W/ REFLEX TO MG FOR LOW K - Abnormal; Notable for the following components:    Glucose 112 (*)     All other components within normal limits    Narrative:     Performed at:  [facility] [address],  [facility], [address]   Phone [phone]   PROTIME-INR - Abnormal; Notable for the following components:    Protime 22.1 (*)     INR 1.90 (*)     All other components within normal limits    Narrative:     Performed at:  [facility] [address], [facility], [facility] [address]   Phone [phone]   APTT - Abnormal; Notable for the following components:    aPTT 72.7 (*)     All other components within normal limits    Narrative:     Performed at:  [facility] [address],  [facility], [address]   Phone [phone]   URINALYSIS - Abnormal; Notable for the following components:    Ketones, Urine TRACE (*) All other components within normal limits    Narrative:     Performed at:  [facility] [address] [place] [employer], [address]   Phone [phone]   BLOOD GAS, VENOUS - Abnormal; Notable for the following components:    pH, Dario 7.486 (*)     pO2, Dario 125.0 (*)     HCO3, Venous 31.9 (*)     Base Excess, Dario 7.7 (*)     Carboxyhemoglobin 4.9 (*)     All other components within normal limits    Narrative:     Performed at:  [facility] [address], [address]   Phone [phone]   CULTURE, URINE   LIPASE    Narrative:     Performed at:  [facility] [address], [place]   Phone [phone]   AMYLASE    Narrative:     Performed at:  [facility] [address], [address]   Phone [phone]   TROPONIN    Narrative:     Performed at:  [facility] [address], [place]   Phone [phone] PEPTIDE    Narrative:     Performed at:  [facility] [address], [place]   Phone [phone]   LACTATE, SEPSIS    Narrative:     Performed at:  [facility] [address], [place]   Phone [phone]   MAGNESIUM    Narrative:     Performed at:  [facility] [address], [place]   Phone [phone]   LACTATE, SEPSIS       All other labs were within normal range or not returned as of this dictation. EKG: All EKG's are interpreted by the Emergency Department Physician in the absence of a cardiologist.  Please see their note for interpretation of EKG. EKG is reviewed by myself. Dated today at 80. Rate 83 sinus rhythm with first-degree AV blockade.   No significant change from 23rd November 21    RADIOLOGY:   Non-plain film images such as CT, Ultrasound and MRI are read by the radiologist. Plain radiographic images are visualized andpreliminarily interpreted by the  ED Provider with the below findings:        Interpretation perthe Radiologist below, if available at the time of this note:    XR CHEST PORTABLE   Final Result   No acute process. No results found. PROCEDURES   Unless otherwise noted below, none     Procedures    CRITICAL CARE TIME   N/A    CONSULTS:  None      EMERGENCY DEPARTMENT COURSE and DIFFERENTIAL DIAGNOSIS/MDM:   Vitals:    Vitals:    01/02/22 1633 01/02/22 1850 01/02/22 1851 01/02/22 1900   BP: 122/64 (!) 130/105  130/77   Pulse: 97  89 82   Resp: 20  23 15   Temp: 98.7 °F (37.1 °C)      TempSrc: Oral      SpO2: 94%  94% 98%   Weight: 150 lb (68 kg)      Height: 5' (1.524 m)          Patient was given thefollowing medications:  Medications   lactated ringers bolus (has no administration in time range)   lactated ringers infusion 1,000 mL (1,000 mLs IntraVENous New Bag 1/2/22 1939)       59-year-old female feeling lightheaded. EKG shows a first-degree AV block unchanged from prior. Checking labs and urinalysis. EKG chest x-ray and labs are all benign and stable from prior. UA shows no acute process. Hydrated here  Feels well. No acute process found for lightheadedness episode  Presently safe and stable for DC. FINAL IMPRESSION      1. Dizziness          DISPOSITION/PLAN   DISPOSITION Decision To Discharge 01/02/2022 08:18:28 PM      PATIENT REFERREDTO:  Shelley Roberts MD  [address] [place] [address]            DISCHARGE MEDICATIONS:  New Prescriptions    No medications on file       DISCONTINUED MEDICATIONS:  Discontinued Medications    No medications on file              (Please note that portions ofthis note were completed with a voice recognition program.  Efforts were made to edit the dictations but occasionally words are mis-transcribed.)    Kannan Carpenter MD (electronically signed)           Hosea Reid MD  01/02/22 2019

## 2022-01-02 NOTE — ED NOTES
Bed: 19  Expected date:   Expected time:   Means of arrival:   Comments:  Ulisses Ragsdale RN  01/02/22 1625

## 2022-01-03 LAB
EKG ATRIAL RATE: 83 BPM
EKG DIAGNOSIS: NORMAL
EKG P AXIS: 51 DEGREES
EKG P-R INTERVAL: 226 MS
EKG Q-T INTERVAL: 384 MS
EKG QRS DURATION: 76 MS
EKG QTC CALCULATION (BAZETT): 451 MS
EKG R AXIS: -12 DEGREES
EKG T AXIS: -4 DEGREES
EKG VENTRICULAR RATE: 83 BPM

## 2022-01-03 PROCEDURE — 93010 ELECTROCARDIOGRAM REPORT: CPT | Performed by: INTERNAL MEDICINE

## 2022-01-04 LAB
ORGANISM: ABNORMAL
URINE CULTURE, ROUTINE: ABNORMAL

## 2022-01-07 ENCOUNTER — ANTI-COAG VISIT (OUTPATIENT)
Dept: PHARMACY | Age: 87
End: 2022-01-07
Payer: MEDICARE

## 2022-01-07 DIAGNOSIS — Z86.711 HISTORY OF PULMONARY EMBOLISM: Primary | ICD-10-CM

## 2022-01-07 LAB — INTERNATIONAL NORMALIZATION RATIO, POC: 2.6

## 2022-01-07 PROCEDURE — 85610 PROTHROMBIN TIME: CPT

## 2022-01-07 PROCEDURE — 99211 OFF/OP EST MAY X REQ PHY/QHP: CPT

## 2022-01-07 NOTE — PROGRESS NOTES
Ms. Shazia Liang is a 80 y.o. 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 Took Xarelto for the 3 years after her first PE. Then developed \"several small clots\" as it was described. She was then switched to warfarin 5mg as of 12/3/18. Pertinent PMH: HTN, Hx of PE,     Patient Reported Findings:  Yes     No  [x]   []       Patient & son verifies current dosing regimen as listed home nurse that takes care of medications    Daughter accompanied patient today- doesn't do medications, not sure of dosing since has caretaker. She will check when she gets home to confirm dose. --> pt confirms dose    []   [x]       S/S bleeding/bruising/swelling/SOB- denies ---> fell 5/9, hit head, spoke with doctor because refused ER so went to doc and was assessed- fine---> denies   []   [x]       Blood in urine or stool- denies   []   [x]       Procedures scheduled in the future at this time   [x]   [x]       Missed Dose - denies   []   [x]       Extra Dose- Denies   []   [x]       Change in medications started taking iron and stool softener---> no changes---> abx for UTI, doesn't know name, done with it for >1 week---> no changes   []   [x]       Change in health/diet/appetite denies changes, no NVD --> no changes no NVD   []   [x]       Change in alcohol use  []   [x]       Change in activity  []   [x]       Hospital admission  [x]   []       Emergency department visit 1/2 for dizziness. INR 1.9. no med changes made. [x]   []       Other complaints family planning to move patient in to assisted living facility      Clinical Outcomes:  Yes     No  []   [x]       Major bleeding event  []   [x]       Thromboembolic event    Duration of warfarin Therapy: indefinitely  INR Range:  2.0-3.0     Presents with daughter today. INR is 2.6 today   Continue weekly dose of 5mg on Mon and Fri and 7.5 mg all other days of the week.    Encouraged to maintain a consistency

## 2022-01-14 ENCOUNTER — PATIENT MESSAGE (OUTPATIENT)
Dept: CARDIOLOGY CLINIC | Age: 87
End: 2022-01-14

## 2022-01-14 NOTE — TELEPHONE ENCOUNTER
From: Homero Gay  To: Dr. Chico Saucedo: 1/14/2022 11:44 AM EST  Subject: Refugia Gum    Dr. Bhakti Goff,    My mom Refsusie Gum is prescribed Adempas by you. Tereso Champagne been getting the medication covered at no cost through [employer]. Hussain just been informed that her coverage has been denied due to not enough funding to the program. It is a charitable program. The out of pocket cost for Adempas is $10,000 per month. She is due for a refill. What are our options? I was given the names of a couple of other charitable programs that cover pulmonary hypertension meds and will call them but this process usually takes awhile. As you know, there is no guarantee that shell get covered by these programs. Thanks.     Apple Masters  [phone]

## 2022-01-14 NOTE — TELEPHONE ENCOUNTER
I would suggest calling this into our outpatient pharmacy and paying cash (not running through her pharmacy), as it will take a long time to get prior authorization. The cost for one month should not be more than $40.   RORY

## 2022-01-14 NOTE — TELEPHONE ENCOUNTER
Our only option is to switch to sildenafil 20 tid. The adempas has worked so well for her, but we could give her that for a month or so if needed while we continue to try to get funding.   RORY

## 2022-01-18 RX ORDER — SILDENAFIL CITRATE 20 MG/1
20 TABLET ORAL 3 TIMES DAILY
Qty: 90 TABLET | Refills: 5 | Status: SHIPPED | OUTPATIENT
Start: 2022-01-18 | End: 2022-01-18 | Stop reason: SDUPTHER

## 2022-01-18 RX ORDER — SILDENAFIL CITRATE 20 MG/1
20 TABLET ORAL 3 TIMES DAILY
COMMUNITY
End: 2022-01-18 | Stop reason: SDUPTHER

## 2022-01-18 RX ORDER — SILDENAFIL CITRATE 20 MG/1
20 TABLET ORAL 3 TIMES DAILY
Qty: 90 TABLET | Refills: 5 | Status: SHIPPED | OUTPATIENT
Start: 2022-01-18 | End: 2022-02-08 | Stop reason: ALTCHOICE

## 2022-01-20 NOTE — PROGRESS NOTES
living). WHO group IV. Class III. Allergies   Allergen Reactions    Naprosyn [Naproxen] Nausea Only     ABDOMINAL PAIN     Current Outpatient Medications   Medication Sig Dispense Refill    ferrous sulfate (IRON 325) 325 (65 Fe) MG tablet TAKE 1 TABLET BY MOUTH 2 TIMES DAILY 180 tablet 3    sildenafil (REVATIO) 20 MG tablet Take 1 tablet by mouth 3 times daily 90 tablet 5    levothyroxine (SYNTHROID) 75 MCG tablet TAKE 1 TABLET BY MOUTH EVERY DAY 90 tablet 0    lisinopril (PRINIVIL;ZESTRIL) 2.5 MG tablet TAKE 1 TABLET BY MOUTH EVERY DAY 90 tablet 3    Riociguat (ADEMPAS) 2.5 MG TABS Take 2.5 mg by mouth 3 times daily 90 tablet 11    furosemide (LASIX) 20 MG tablet Take one tablet as needed every 12 hours for shortness of breath or swelling. 30 tablet 11    OXYGEN Inhale 2 L into the lungs continuous      warfarin (COUMADIN) 5 MG tablet Hold for  INR greater  Than 3.0 (Patient taking differently: Hold for  INR greater  Than 3.0  Managed by [facility]) 15 tablet 0    omeprazole (PRILOSEC) 20 MG delayed release capsule Take 20 mg by mouth daily      docusate sodium (COLACE) 100 MG capsule Take 1 capsule by mouth daily as needed for Constipation 30 capsule 2    Multiple Vitamins-Minerals (THERAPEUTIC MULTIVITAMIN-MINERALS) tablet Take 1 tablet by mouth daily      Ascorbic Acid (VITAMIN C) 500 MG tablet Take 500 mg by mouth daily       No current facility-administered medications for this visit.        Past Medical History:   Diagnosis Date    Arthritis     Hyperlipidemia     Hypertension     Hypothyroidism     Lumbar herniated disc     Movement disorder     scoliosis    PAH (pulmonary arterial hypertension) with portal hypertension (HCC)     Pulmonary emboli ([facility] [address] 12/2014    Scoliosis     Scoliosis     Thyroid disease     hypothyroid     Past Surgical History:   Procedure Laterality Date    COLONOSCOPY  7/08    EXCISION / BIOPSY SKIN LESION OF ARM Left 9/18/2019    EXCISION LEFT FOREARM MASS performed by Cj Barajas MD at [address]      both knees    NECK SURGERY Right 9/18/2019    EXCISION OF RIGHT NECK MASS performed by Cj Barajas MD at [address] [facility] History   Problem Relation Age of Onset    High Cholesterol Mother     High Cholesterol Father     Cancer Brother      Social History     Socioeconomic History    Marital status:      Spouse name: Not on file    Number of children: 6    Years of education: 12    Highest education level: Not on file   Occupational History    Not on file   Tobacco Use    Smoking status: Former Smoker     Packs/day: 0.25     Years: 15.00     Pack years: 3.75     Types: Cigarettes    Smokeless tobacco: Former User     Quit date: 12/27/1965   Vaping Use    Vaping Use: Never used   Substance and Sexual Activity    Alcohol use: No    Drug use: No    Sexual activity: Not Currently   Other Topics Concern    Not on file   Social History Narrative    Not on file     Social Determinants of Health     Financial Resource Strain: Low Risk     Difficulty of Paying Living Expenses: Not hard at all   Food Insecurity: No Food Insecurity    Worried About [facility] in the Last Year: Never true    [facility] in the Last Year: Never true   Transportation Needs:     Lack of Transportation (Medical): Not on file    Lack of Transportation (Non-Medical):  Not on file   Physical Activity:     Days of Exercise per Week: Not on file    Minutes of Exercise per Session: Not on file   Stress:     Feeling of Stress : Not on file   Social Connections:     Frequency of Communication with Friends and Family: Not on file    Frequency of Social Gatherings with Friends and Family: Not on file    Attends Denominational Services: Not on file    Active Member of Clubs or Organizations: Not on file    Attends Club or Organization Meetings: Not on file    Marital Status: Not on file   Intimate Partner Violence:     Fear of Current or Ex-Partner: Not on file    Emotionally Abused: Not on file    Physically Abused: Not on file    Sexually Abused: Not on file   Housing Stability:     Unable to Pay for Housing in the Last Year: Not on file    Number of Places Lived in the Last Year: Not on file    Unstable Housing in the Last Year: Not on file     Review of Systems:   · Constitutional: there has been no unanticipated weight loss. There's been no change in energy level, sleep pattern, or activity level. · Eyes: No visual changes or diplopia. No scleral icterus. · ENT: No Headaches, hearing loss or vertigo. No mouth sores or sore throat. · Cardiovascular: Reviewed in HPI  · Respiratory: No cough or wheezing, no sputum production. No hematemesis. · Gastrointestinal: No abdominal pain, appetite loss, blood in stools. No change in bowel or bladder habits. · Genitourinary: No dysuria, trouble voiding, or hematuria. · Musculoskeletal:  No gait disturbance, weakness or joint complaints. · Integumentary: No rash or pruritis. · Neurological: No headache, diplopia, change in muscle strength, numbness or tingling. No change in gait, balance, coordination, mood, affect, memory, mentation, behavior. · Psychiatric: No anxiety, no depression. · Endocrine: No malaise, fatigue or temperature intolerance. No excessive thirst, fluid intake, or urination. No tremor. · Hematologic/Lymphatic: No abnormal bruising or bleeding, blood clots or swollen lymph nodes. · Allergic/Immunologic: No nasal congestion or hives. Physical Examination:    Vitals:    02/02/22 1447   BP: 130/70   Site: Right Upper Arm   Position: Sitting   Cuff Size: Medium Adult   Pulse: 68   SpO2: 95%   Weight: 150 lb (68 kg)   Height: 5' (1.524 m)     Body mass index is 29.29 kg/m².      Wt Readings from Last 3 Encounters:   02/02/22 150 lb (68 kg)   01/02/22 150 lb (68 kg)   11/25/21 150 lb 4.8 oz (68.2 kg)     BP Readings from Last 3 Encounters:   22 130/70   22 (!) 147/78   21 124/73     Constitutional and General Appearance:   WD/WN in NAD, wears oxygen   HEENT:  NC/AT  JUAN JOSÉ  No problems with hearing  Skin:  Warm, dry  Respiratory:  · Normal excursion and expansion without use of accessory muscles  · Resp Auscultation: Normal breath sounds without dullness  Cardiovascular:  · The apical impulses not displaced  · Heart tones are crisp and normal  · Cervical veins are not engorged  · The carotid upstroke is normal in amplitude and contour without delay or bruit  · JVP less than 8 cm H2O  RRR with nl S1 and S2 without m,r,g  · Peripheral pulses are symmetrical and full  · There is no clubbing, cyanosis of the extremities. · No edema  · Femoral Arteries: 2+ and equal  · Pedal Pulses: 2+ and equal   Neck:  · No thyromegaly  Abdomen:  · No masses or tenderness  · Liver/Spleen: No Abnormalities Noted  Neurological/Psychiatric:  · Alert and oriented in all spheres  · Moves all extremities well  · Exhibits normal gait balance and coordination  · No abnormalities of mood, affect, memory, mentation, or behavior are noted    Recent hospitalization and testin minute walk 19 at 1:45pm.     Completed 4 laps. 73.2 meters walked/240 feet. Walked 4 minutes  [place]  118/78  74  Pulse ox  92% on 2 liters NC    Immediate post walk   142/80  116  91% on 2 liters NC    5 minutes post walk  96/76  105  93% on 2 liters NC    ECHO 2019  Summary   -Normal left ventricle size and wall thickness. Hyperdynamic systolic   function with an estimated ejection fraction of 65-70%. No regional wall   motion abnormalities are seen. E/e\"= 9.4 .   -Grade I diastolic dysfunction with normal LV filling pressures.   -Moderate aortic stenosis with a peak gradient of 37 mmHg and a mean   pressure gradient of 19mmHg.   -Mild mitral regurgitation.   -Mild to moderate tricuspid regurgitation.   -The right atrium is mildly dilated.   -Estimated pulmonary artery systolic pressure is severely elevated at 77   mmHg assuming a right atrial pressure of 3 mmHg. Carotid duplex 29/4/90  YINKA < 15%, LICA - no significant stenosis    VQ scan 11/30/18  High probability for PE     Echo 11/29/18   -Normal left ventricle size, wall thickness, and systolic function with EF 70% 3.02m/s and a mean gradient 25 mmHg. The aortic valve area - 1.08 cm^2. No significant regurgitation noted.   -Mild-to-moderate tricuspid regurgitation with a RVSP estimation of 70 mmHg, suggestive of severe pulm HTN.    -Normal diastolic function. E/e'=10.3    CT chest pulmonary 11/28/18 : no evidence or proximal PE but enlarged main pulmonary artery which can be seen with pulmonary HTN    Left venous dulex 11/28/18 : no evidence of deep vein or superficial vein thrombosis involving LLE or right common femoral vein. Labs were reviewed including labs from other hospital systems through [facility]. Cardiac testing was reviewed including echos, nuclear scans, cardiac catheterization, including from other hospital systems through [facility]. Assessment:    1. PAH (pulmonary artery hypertension) ([address]    2. Essential hypertension    3. Nonrheumatic aortic valve stenosis    4. CTEPH (chronic thromboembolic pulmonary hypertension) ([facility])      1. CTEPH (chronic thromboembolic pulmonary hypertension): Stable. Using her oxygen. -PASP increased to 77mmHg. Started Adempas 0.5mg three times a day Oct 2019. Side effects explained to patient and son Jyothi Arellano. May need to decrease or DC Norvasc for benefits of Adempas. Uptitrate as tolerated with help of home health.     -Now on Adempas 2.5mg TID (has been on sildenafil until next shipment of Ademps arrives). -Echo on 2/20/19  With pulmonary artery systolic pressure is severely elevated at 77 mmHg assuming a right atrial pressure of 3 mmHg.   -Echo 9/30/20> RVSP of 25mmHg. 2. Shortness of breath, chronic:  Stable. Using oxygen. 3. Iron deficiency anemia, h/o:  Diagnosed with labs done after office. 1/2/22> H&H 13.6 / 41.4      4. Other pulmonary embolism without acute cor pulmonale, unspecified chronicity:  -Stable. Continue Coumadin as directed. 5. Essential hypertension: /70 (Site: Right Upper Arm, Position: Sitting, Cuff Size: Medium Adult)   Pulse 68   Ht 5' (1.524 m)   Wt 150 lb (68 kg)   SpO2 95%   BMI 29.29 kg/m²    -Stable. Denies dizziness. 6. Nonrheumatic aortic valve stenosis: Stable      Plan:  1. No med changes, continue Adempas 2.5 mg po tid  2. Recent labs reviewed  3. ECHO soon  4. RTO in 6 months      I appreciate the opportunity of cooperating in the care of this patient. José Luis Mohamud M.D., [address] attestation: This note was scribed in the presence of Dr. Royer Brizuela MD, by Manolo Ridley RN. The scribe's documentation has been prepared under my direction and personally reviewed by me in its entirety. I confirm that the note above accurately reflects all work, treatment, procedures, and medical decision making performed by me.

## 2022-01-21 ENCOUNTER — ANTI-COAG VISIT (OUTPATIENT)
Dept: PHARMACY | Age: 87
End: 2022-01-21
Payer: MEDICARE

## 2022-01-21 DIAGNOSIS — Z86.711 HISTORY OF PULMONARY EMBOLISM: Primary | ICD-10-CM

## 2022-01-21 LAB — INTERNATIONAL NORMALIZATION RATIO, POC: 2.3

## 2022-01-21 PROCEDURE — 99211 OFF/OP EST MAY X REQ PHY/QHP: CPT

## 2022-01-21 PROCEDURE — 85610 PROTHROMBIN TIME: CPT

## 2022-01-21 NOTE — PROGRESS NOTES
Ms. Sacha Rojas is a 80 y.o. 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 Took Xarelto for the 3 years after her first PE. Then developed \"several small clots\" as it was described. She was then switched to warfarin 5mg as of 12/3/18. Pertinent PMH: HTN, Hx of PE,     Patient Reported Findings:  Yes     No  [x]   []       Patient & son verifies current dosing regimen as listed home nurse that takes care of medications    Daughter accompanied patient today- doesn't do medications, not sure of dosing since has caretaker. She will check when she gets home to confirm dose. --> pt confirms dose    []   [x]       S/S bleeding/bruising/swelling/SOB- denies   []   [x]       Blood in urine or stool- denies   []   [x]       Procedures scheduled in the future at this time   []   [x]       Missed Dose - denies   []   [x]       Extra Dose- Denies   []   [x]       Change in medications started taking iron and stool softener---> no changes---> abx for UTI, doesn't know name, done with it for >1 week---> no changes   []   [x]       Change in health/diet/appetite denies changes, no NVD --> no changes no NVD   []   [x]       Change in alcohol use  []   [x]       Change in activity  []   [x]       Hospital admission  [x]   []       Emergency department visit 1/2 for dizziness. INR 1.9. no med changes made. [x]   []       Other complaints family planning to move patient in to assisted living facility      Clinical Outcomes:  Yes     No  []   [x]       Major bleeding event  []   [x]       Thromboembolic event    Duration of warfarin Therapy: indefinitely  INR Range:  2.0-3.0     Presents with daughter today. INR is 2.3 today   Continue weekly dose of 5mg on Mon and Fri and 7.5 mg all other days of the week. Encouraged to maintain a consistency of vegetables/salads.    Refilled warfarin at op pharmacy   Recheck INR in 4 weeks, 2/18  Is going to be moving in to [address] assisted living on 2/7. RN can check INR but unsure if MD there will manage.  Made appt for clinic tentatively but if assisted living is able to take over management will cancel appt in clinic     Patient consent signed 11/11/21    Referring is Dr. Guru Giordano   INR (no units)   Date Value   01/07/2022 2.6   01/02/2022 1.90 (H)   12/30/2021 1.7   12/20/2021 2.00   12/16/2021 1.80   12/13/2021 1.20       For Pharmacy Admin Tracking Only     Intervention Detail: Refill(s) Provided   Total # of Interventions Recommended: 1   Total # of Interventions Accepted: 1   Time Spent (min): [address] [facility], PharmD

## 2022-01-26 RX ORDER — FERROUS SULFATE 325(65) MG
325 TABLET ORAL 2 TIMES DAILY
Qty: 180 TABLET | Refills: 3 | Status: SHIPPED | OUTPATIENT
Start: 2022-01-26 | End: 2022-03-10

## 2022-02-02 ENCOUNTER — OFFICE VISIT (OUTPATIENT)
Dept: CARDIOLOGY CLINIC | Age: 87
End: 2022-02-02
Payer: MEDICARE

## 2022-02-02 VITALS
WEIGHT: 150 LBS | HEART RATE: 68 BPM | HEIGHT: 60 IN | BODY MASS INDEX: 29.45 KG/M2 | DIASTOLIC BLOOD PRESSURE: 70 MMHG | SYSTOLIC BLOOD PRESSURE: 130 MMHG | OXYGEN SATURATION: 95 %

## 2022-02-02 DIAGNOSIS — I27.24 CTEPH (CHRONIC THROMBOEMBOLIC PULMONARY HYPERTENSION) (HCC): ICD-10-CM

## 2022-02-02 DIAGNOSIS — I10 ESSENTIAL HYPERTENSION: ICD-10-CM

## 2022-02-02 DIAGNOSIS — I27.21 PAH (PULMONARY ARTERY HYPERTENSION) (HCC): Primary | ICD-10-CM

## 2022-02-02 DIAGNOSIS — I35.0 NONRHEUMATIC AORTIC VALVE STENOSIS: ICD-10-CM

## 2022-02-02 PROCEDURE — 99214 OFFICE O/P EST MOD 30 MIN: CPT | Performed by: INTERNAL MEDICINE

## 2022-02-08 ENCOUNTER — TELEPHONE (OUTPATIENT)
Dept: FAMILY MEDICINE CLINIC | Age: 87
End: 2022-02-08

## 2022-02-08 NOTE — TELEPHONE ENCOUNTER
Yesi Yancey  from [address]  Is asking for a verbal order for OT and PT.  PLEASE CALL [phone]

## 2022-02-08 NOTE — TELEPHONE ENCOUNTER
Please fix her med list and call joey sarkar to advise she is only to take Adempas and not sildenafil.   RORY

## 2022-02-09 RX ORDER — TRAMADOL HYDROCHLORIDE 50 MG/1
50 TABLET ORAL EVERY 8 HOURS PRN
COMMUNITY
End: 2022-03-25

## 2022-02-10 ENCOUNTER — TELEPHONE (OUTPATIENT)
Dept: FAMILY MEDICINE CLINIC | Age: 87
End: 2022-02-10

## 2022-02-10 NOTE — TELEPHONE ENCOUNTER
Jose Luis Recinos with [employer] needs a call - ext #[phone]    Asking about concentrator and oxygen levels.

## 2022-02-11 NOTE — TELEPHONE ENCOUNTER
I have been on hold for over 10 mins. . I tried typing in ext as well and does not work. . if they call back please see wha they want

## 2022-02-18 ENCOUNTER — TELEPHONE (OUTPATIENT)
Dept: PHARMACY | Age: 87
End: 2022-02-18

## 2022-02-18 NOTE — TELEPHONE ENCOUNTER
Pt d/c from [facility]  and d/c to a rehab center. Rehab center is not [facility]. Called patient's son to find out where pt got d/c to. Need to find out if our [address] still needs to manage pt's warfarin or if her ECF will manage. Unable to get a hold of patient's son at this time. LVM for him to call us back.      Neda Chao, PharmD  Pharmacy Resident   [id number]

## 2022-03-10 RX ORDER — FERROUS SULFATE 325(65) MG
TABLET ORAL
Qty: 180 TABLET | Refills: 0 | Status: SHIPPED | OUTPATIENT
Start: 2022-03-10

## 2022-03-10 NOTE — TELEPHONE ENCOUNTER
Medication:   Requested Prescriptions     Pending Prescriptions Disp Refills    FEROSUL 325 (65 Fe) MG tablet [Pharmacy Med Name: FERROUS SULFATE 325 MG] 60 tablet 0     Sig: TAKE 1 TABLET BY MOUTH TWICE A DAY WITH MEALS        Last Filled:  01/26/2022 #180 3rf     Patient Phone Number: [phone] (home)     Last appt: 12/28/2021   Next appt: Visit date not found    Last OARRS:   RX Monitoring 12/28/2021   Attestation -   Periodic Controlled Substance Monitoring No signs of potential drug abuse or diversion identified.

## 2022-03-11 ENCOUNTER — TELEPHONE (OUTPATIENT)
Dept: FAMILY MEDICINE CLINIC | Age: 87
End: 2022-03-11

## 2022-03-11 NOTE — TELEPHONE ENCOUNTER
Cayetano Dorantes would like a verbal order for INR and would also like to know when it's due.     Please call back and advise

## 2022-03-11 NOTE — TELEPHONE ENCOUNTER
LVM for Jesica Dominguez give her dr Alexandra Blum message but it looks like last inr was done 1/21/22 and was 2.3

## 2022-03-11 NOTE — TELEPHONE ENCOUNTER
EXAMINATION TYPE: XR Hip Limited LT

 

DATE OF EXAM: 11/11/2017

 

COMPARISON: 10/26/2017

 

HISTORY: Postop

 

TECHNIQUE: Single view

 

FINDINGS: There is a left hip prosthesis. Components appear in anatomic position.

 

IMPRESSION: Left hip prosthesis. No complicating process seen. We have generally not managed her INR. I believe it was done by the anticoagulation clinic. I do not know when her last INR was done but I think she was in the hospital recently so I would presume she had an INR done at that time.   If that INR is okay and her dose of warfarin has not been changed, what ever frequencies she was on prior to her hospitalization of doing her INR would be the frequency I would return to

## 2022-03-14 NOTE — TELEPHONE ENCOUNTER
Patient has a skin tear to left knee, scabbed over but she still has a suture or two  Should this be removed by [facility] or does Dr. Trini Otero want to evaluate  [facility] states patient is not going back to [facility] due to her living at [facility] can drawn INR in a few days

## 2022-03-15 ENCOUNTER — TELEPHONE (OUTPATIENT)
Dept: FAMILY MEDICINE CLINIC | Age: 87
End: 2022-03-15

## 2022-03-17 ENCOUNTER — TELEPHONE (OUTPATIENT)
Dept: CARDIOLOGY CLINIC | Age: 87
End: 2022-03-17

## 2022-03-17 NOTE — TELEPHONE ENCOUNTER
spoke to [facility] she stated the NH is the one who is managing doses and refills of medication. she advised me to call the NH to find out. Called the NH transferred to nursing station no answer called back to get the director and again no answer. Will have to try later.

## 2022-03-17 NOTE — TELEPHONE ENCOUNTER
NH  states pt came back from [facility] and is on 6 mg coumadin and needs to order for PT/INR and any labs needed. Please  fax [phone].   Any questions please call

## 2022-03-21 ENCOUNTER — TELEPHONE (OUTPATIENT)
Dept: FAMILY MEDICINE CLINIC | Age: 87
End: 2022-03-21

## 2022-03-21 DIAGNOSIS — I82.90 VTE (VENOUS THROMBOEMBOLISM): Primary | ICD-10-CM

## 2022-03-21 NOTE — TELEPHONE ENCOUNTER
Patient needs Labs for her medication to continue.     Medication is Coumadin    Please put labs in and advise Tanmay Colon [address] at [phone]

## 2022-03-23 ENCOUNTER — TELEPHONE (OUTPATIENT)
Dept: FAMILY MEDICINE CLINIC | Age: 87
End: 2022-03-23

## 2022-03-23 NOTE — TELEPHONE ENCOUNTER
How much coumadin is she taking?   Has there been any recent changes in her dosage or has she been maintained on the same dose she has usually been on

## 2022-03-23 NOTE — TELEPHONE ENCOUNTER
I would continue taking the 5 mg tablet for now since she has been stable on this and nothing really has changed. The family plans on taking her back to the anticoagulation clinic where they can resume her orders. She should see them in 1 week.

## 2022-03-23 NOTE — TELEPHONE ENCOUNTER
Irving Fitzpatrick called back:  Taking 5mg Coumadin daily  No change in doses and have been on this for awhile  Irving Fitzpatrick has only seen her 2 x  Last Tues  INR 1.2  Call Irving Fitzpatrick back

## 2022-03-25 ENCOUNTER — TELEPHONE (OUTPATIENT)
Dept: FAMILY MEDICINE CLINIC | Age: 87
End: 2022-03-25

## 2022-03-25 DIAGNOSIS — M41.25 OTHER IDIOPATHIC SCOLIOSIS, THORACOLUMBAR REGION: ICD-10-CM

## 2022-03-25 RX ORDER — TRAMADOL HYDROCHLORIDE 50 MG/1
50 TABLET ORAL EVERY 8 HOURS PRN
Qty: 90 TABLET | Refills: 2 | Status: SHIPPED | OUTPATIENT
Start: 2022-03-25 | End: 2022-04-24

## 2022-03-25 NOTE — TELEPHONE ENCOUNTER
Called NH transferred to [address] [place] [address] to call our office if they are managing her PT/INR lab draws and if their doctor is managing her doses. Should our office send over these orders or should NH be managing?   Please advise

## 2022-03-29 ENCOUNTER — ANTI-COAG VISIT (OUTPATIENT)
Dept: PHARMACY | Age: 87
End: 2022-03-29
Payer: MEDICARE

## 2022-03-29 DIAGNOSIS — Z86.711 HISTORY OF PULMONARY EMBOLISM: Primary | ICD-10-CM

## 2022-03-29 LAB — INTERNATIONAL NORMALIZATION RATIO, POC: 1.5

## 2022-03-29 PROCEDURE — 85610 PROTHROMBIN TIME: CPT

## 2022-03-29 PROCEDURE — 99212 OFFICE O/P EST SF 10 MIN: CPT

## 2022-03-29 NOTE — PROGRESS NOTES
Ms. Michelet Albrecht is a 80 y.o. 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 Took Xarelto for the 3 years after her first PE. Then developed \"several small clots\" as it was described. She was then switched to warfarin 5mg as of 12/3/18. Pertinent PMH: HTN, Hx of PE,     Patient Reported Findings:  Yes     No  [x]   []       Patient & son verifies current dosing regimen as listed home nurse that takes care of medications    Daughter accompanied patient today- doesn't do medications, not sure of dosing since has caretaker. She will check when she gets home to confirm dose. --> pt confirms dose    []   [x]       S/S bleeding/bruising/swelling/SOB- denies   []   [x]       Blood in urine or stool- denies   []   [x]       Procedures scheduled in the future at this time   []   [x]       Missed Dose - denies   []   [x]       Extra Dose- Denies   [x]   []       Change in medications started taking iron and stool softener---> no changes, but unable to verify since pt doesn't manage meds   []   [x]       Change in health/diet/appetite denies changes, no NVD --> no changes no NVD   []   [x]       Change in alcohol use  []   [x]       Change in activity  []   [x]       Hospital admission  [x]   []       Emergency department visit 1/2 for dizziness. INR 1.9. no med changes made. [x]   []       Other complaints family planning to move patient in to assisted living facility      Clinical Outcomes:  Yes     No  []   [x]       Major bleeding event  []   [x]       Thromboembolic event    Duration of warfarin Therapy: indefinitely  INR Range:  2.0-3.0     RTC today after being managed in facility on and off. MD will not manage through facility so pt RTC. Per son Pt INR 3/22 1.1. Was d/c to [facility] and pt was receiving 6 mg daily.  Pt was then sent back to [facility] and has been getting warfarin pills from [facility] given at d/c which believe is 6 mg daily. But per note from [facility] to Dr. Funmilayo Valdez, pt was receiving 5 mg qd. Called and LVM with RN at [facility] ([phone]) to determine what strength tablet pt has been receiving     INR is 1.5 today   Presents with son who does not manage warfarin. Called and spoke to [place], see above. Since unclear if pt has received 5 mg or 6 mg daily for past few weeks and unable to verify currently, will lower weekly dose to total 45 mg (either received 6 mg - 42mg/week or 5 mg qd - 35mg/week for the past weeks)     Decrease weekly dose to 5mg on Mon, Wed and Fri and 7.5 mg all other days of the week. Encouraged to maintain a consistency of vegetables/salads. Refilled warfarin at op pharmacy.   Explained for son to verify that pt starts receiving refill of warfarin today, not old tablets since unsure what patient had been receiving   Recheck INR in 1 week,     Patient consent signed 21    Referring is Dr. Nathalia Hand   INR (no units)   Date Value   2022 2.3   2022 2.6   2022 1.90 (H)   2021 1.7   2021 2.00   2021 1.80   2021 1.20       For Pharmacy Admin Tracking Only     Intervention Detail: Adherence Monitorin, Dose Adjustment: 1, reason: Therapy Optimization and Refill(s) Provided   Total # of Interventions Recommended: 3   Total # of Interventions Accepted: 3   Time Spent (min): [address] [facility], [facility], PharmD

## 2022-04-05 ENCOUNTER — ANTI-COAG VISIT (OUTPATIENT)
Dept: PHARMACY | Age: 87
End: 2022-04-05
Payer: MEDICARE

## 2022-04-05 DIAGNOSIS — Z86.711 HISTORY OF PULMONARY EMBOLISM: Primary | ICD-10-CM

## 2022-04-05 LAB — INTERNATIONAL NORMALIZATION RATIO, POC: 2.5

## 2022-04-05 PROCEDURE — 99211 OFF/OP EST MAY X REQ PHY/QHP: CPT

## 2022-04-05 PROCEDURE — 85610 PROTHROMBIN TIME: CPT

## 2022-04-05 NOTE — PROGRESS NOTES
Ms. Marky Melton is a 80 y.o. 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 Took Xarelto for the 3 years after her first PE. Then developed \"several small clots\" as it was described. She was then switched to warfarin 5mg as of 12/3/18. Pertinent PMH: HTN, Hx of PE,     Patient Reported Findings:  Yes     No  [x]   []       Patient & son verifies current dosing regimen as listed home nurse that takes care of medications    Daughter accompanied patient today- doesn't do medications, not sure of dosing since has caretaker. She will check when she gets home to confirm dose. --> pt confirms dose    []   [x]       S/S bleeding/bruising/swelling/SOB- denies   []   [x]       Blood in urine or stool- denies   []   [x]       Procedures scheduled in the future at this time   []   [x]       Missed Dose - denies   []   [x]       Extra Dose- Denies   []   [x]       Change in medications started taking iron and stool softener---> no changes, but unable to verify since pt doesn't manage meds --> denies changes in the past week   []   [x]       Change in health/diet/appetite denies changes, no NVD --> no changes no NVD   []   [x]       Change in alcohol use  []   [x]       Change in activity  []   [x]       Hospital admission  [x]   []       Emergency department visit 1/2 for dizziness. INR 1.9. no med changes made. [x]   []       Other complaints patient living at [facility]:  Yes     No  []   [x]       Major bleeding event  []   [x]       Thromboembolic event    Duration of warfarin Therapy: indefinitely  INR Range:  2.0-3.0     RTC today after being managed in facility on and off. MD will not manage through facility so pt RTC. Per son Pt INR 3/22 1.1. Was d/c to [email or web address] and pt was receiving 6 mg daily.  Pt was then sent back to [facility] and has been getting [facility]c which believe is 6 mg daily. But per note from [facility] to Dr. Ruth Lawrence, pt was receiving 5 mg qd. INR is 2.5 today   Presents with son who does not manage warfarin   Continue weekly dose of 5mg on Mon, Wed and Fri and 7.5 mg all other days of the week. Encouraged to maintain a consistency of vegetables/salads.    Recheck INR in 2 weeks, 4/18    Patient consent signed 11/11/21    Referring is Dr. Sapphire Bhatt   INR (no units)   Date Value   03/29/2022 1.5   01/21/2022 2.3   01/07/2022 2.6   01/02/2022 1.90 (H)   12/30/2021 1.7   12/20/2021 2.00   12/16/2021 1.80   12/13/2021 1.20       For Pharmacy Admin Tracking Only     Total # of Interventions Recommended: 0   Total # of Interventions Accepted: 0   Time Spent (min): [address], [facility], PharmD

## 2022-04-18 ENCOUNTER — TELEPHONE (OUTPATIENT)
Dept: PHARMACY | Age: 87
End: 2022-04-18

## 2022-04-18 NOTE — TELEPHONE ENCOUNTER
Patient got sick in the car, on the way to her appt and son said he had to take her back home and would call and reschedule.

## 2022-04-21 ENCOUNTER — ANTI-COAG VISIT (OUTPATIENT)
Dept: PHARMACY | Age: 87
End: 2022-04-21
Payer: MEDICARE

## 2022-04-21 DIAGNOSIS — Z86.711 HISTORY OF PULMONARY EMBOLISM: Primary | ICD-10-CM

## 2022-04-21 LAB — INTERNATIONAL NORMALIZATION RATIO, POC: 1.8

## 2022-04-21 PROCEDURE — 85610 PROTHROMBIN TIME: CPT

## 2022-04-21 PROCEDURE — 99211 OFF/OP EST MAY X REQ PHY/QHP: CPT

## 2022-04-21 NOTE — PROGRESS NOTES
Ms. Demario Hernández is a 80 y.o. 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 Took Xarelto for the 3 years after her first PE. Then developed \"several small clots\" as it was described. She was then switched to warfarin 5mg as of 12/3/18. Pertinent PMH: HTN, Hx of PE,     Patient Reported Findings:  Yes     No  [x]   []       Patient & son verifies current dosing regimen as listed home nurse that takes care of medications    Daughter accompanied patient today- doesn't do medications, not sure of dosing since has caretaker. She will check when she gets home to confirm dose. --> pt confirms dose    []   [x]       S/S bleeding/bruising/swelling/SOB- denies   []   [x]       Blood in urine or stool- denies   []   [x]       Procedures scheduled in the future at this time   []   [x]       Missed Dose - unsure, nursing home only had AVS through apt and had to RS so unclear what they gave her   []   [x]       Extra Dose- Denies   []   [x]       Change in medications started taking iron and stool softener---> no changes, but unable to verify since pt doesn't manage meds --> denies changes in the past week   []   [x]       Change in health/diet/appetite denies changes, no NVD --> no changes no NVD ---> unsure since at nursing home, did throw up earlier this week and not sure if she missed that dose then   []   [x]       Change in alcohol use  []   [x]       Change in activity  []   [x]       Hospital admission  [x]   []       Emergency department visit 1/2 for dizziness. INR 1.9. no med changes made. [x]   []       Other complaints patient living at [facility]:  Yes     No  []   [x]       Major bleeding event  []   [x]       Thromboembolic event    Duration of warfarin Therapy: indefinitely  INR Range:  2.0-3.0     RTC today after being managed in facility on and off. MD will not manage through facility so pt RTC.  Per son Pt INR

## 2022-05-04 ENCOUNTER — ANTI-COAG VISIT (OUTPATIENT)
Dept: PHARMACY | Age: 87
End: 2022-05-04
Payer: MEDICARE

## 2022-05-04 DIAGNOSIS — Z86.711 HISTORY OF PULMONARY EMBOLISM: Primary | ICD-10-CM

## 2022-05-04 LAB — INTERNATIONAL NORMALIZATION RATIO, POC: 1.8

## 2022-05-04 PROCEDURE — 99212 OFFICE O/P EST SF 10 MIN: CPT

## 2022-05-04 PROCEDURE — 85610 PROTHROMBIN TIME: CPT

## 2022-05-04 NOTE — TELEPHONE ENCOUNTER
Daughter calling to rs the appt that was cxld in 10/2021 . Her appt was with THIAGO but is she really supposed to see EP? Last office note from [address] [place] [address] in 4/2021 says get echo and see RORY in 6 months . Since RORY may be out should she see [address] [place] [address] again ? no

## 2022-05-04 NOTE — PROGRESS NOTES
Ms. Marvie Barthel is a 80 y.o. 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 Took Xarelto for the 3 years after her first PE. Then developed \"several small clots\" as it was described. She was then switched to warfarin 5mg as of 12/3/18. Pertinent PMH: HTN, Hx of PE,     Patient Reported Findings:  Yes     No  [x]   []       Patient & son verifies current dosing regimen as listed home nurse that takes care of medications    Daughter accompanied patient today- doesn't do medications, not sure of dosing since has caretaker. She will check when she gets home to confirm dose. --> pt confirms dose --> unable to confirm dose, RN takes care of meds    []   [x]       S/S bleeding/bruising/swelling/SOB- denies   []   [x]       Blood in urine or stool- denies   []   [x]       Procedures scheduled in the future at this time   []   [x]       Missed Dose - denies   []   [x]       Extra Dose- Denies   []   [x]       Change in medications started taking iron and stool softener---> no changes, but unable to verify since pt doesn't manage meds --> denies changes in the past week   []   [x]       Change in health/diet/appetite denies changes, no NVD --> no changes no NVD    []   [x]       Change in alcohol use  []   [x]       Change in activity  []   [x]       Hospital admission  [x]   []       Emergency department visit 1/2 for dizziness. INR 1.9. no med changes made. [x]   []       Other complaints patient living at [facility]:  Yes     No  []   [x]       Major bleeding event  []   [x]       Thromboembolic event    Duration of warfarin Therapy: indefinitely  INR Range:  2.0-3.0     RTC today after being managed in facility on and off. MD will not manage through facility so pt RTC. Per son Pt INR 3/22 1.1. [facility] and [facility] was [facility] daily.  Pt was then sent back to [facility] and has been getting warfarin pills from [facility] given at d/c which believe is 6 mg daily. But per note from [facility] to Dr. Heather Maloney, pt was receiving 5 mg qd. Presents with son who does not manage warfarin. INR is 1.8 again today   Increase weekly dose to 5mg on Mon and Fri and 7.5mg all other days. (5.6% inc)  Advised for son to check with RN to ensure dose of warfarin is changed for today   Encouraged to maintain a consistency of vegetables/salads.    Recheck INR in 3 weeks, 5/25    **Print 2 copies of AVS for facility**    Patient consent signed 11/11/21    Referring is Dr. Riya Camargo   INR (no units)   Date Value   04/21/2022 1.8   04/05/2022 2.5   03/29/2022 1.5   01/21/2022 2.3   01/02/2022 1.90 (H)   12/20/2021 2.00   12/16/2021 1.80   12/13/2021 1.20       For Pharmacy Admin Tracking Only     Intervention Detail: Dose Adjustment: 1, reason: Therapy Optimization   Total # of Interventions Recommended: 1   Total # of Interventions Accepted: 1   Time Spent (min): [address] [facility] [place], PharmD

## 2022-05-25 ENCOUNTER — ANTI-COAG VISIT (OUTPATIENT)
Dept: PHARMACY | Age: 87
End: 2022-05-25
Payer: MEDICARE

## 2022-05-25 DIAGNOSIS — Z86.711 HISTORY OF PULMONARY EMBOLISM: Primary | ICD-10-CM

## 2022-05-25 LAB — INTERNATIONAL NORMALIZATION RATIO, POC: 1.9

## 2022-05-25 PROCEDURE — 99211 OFF/OP EST MAY X REQ PHY/QHP: CPT

## 2022-05-25 PROCEDURE — 85610 PROTHROMBIN TIME: CPT

## 2022-05-25 NOTE — PROGRESS NOTES
Ms. Demario Hernández is a 80 y.o. 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 Took Xarelto for the 3 years after her first PE. Then developed \"several small clots\" as it was described. She was then switched to warfarin 5mg as of 12/3/18. Pertinent PMH: HTN, Hx of PE,     Patient Reported Findings:  Yes     No  [x]   []       Patient & son verifies current dosing regimen as listed home nurse that takes care of medications    Daughter accompanied patient today- doesn't do medications, not sure of dosing since has caretaker. She will check when she gets home to confirm dose. --> pt confirms dose --> unable to confirm dose, RN takes care of meds    []   [x]       S/S bleeding/bruising/swelling/SOB- denies   []   [x]       Blood in urine or stool- denies   []   [x]       Procedures scheduled in the future at this time   []   [x]       Missed Dose - denies   []   [x]       Extra Dose- Denies   []   [x]       Change in medications started taking iron and stool softener---> no changes, but unable to verify since pt doesn't manage meds --> denies changes in the past week   []   [x]       Change in health/diet/appetite denies changes, no NVD --> no changes no NVD    []   [x]       Change in alcohol use  []   [x]       Change in activity  []   [x]       Hospital admission  [x]   []       Emergency department visit 1/2 for dizziness. INR 1.9. no med changes made. [x]   []       Other complaints patient living at [facility]:  Yes     No  []   [x]       Major bleeding event  []   [x]       Thromboembolic event    Duration of warfarin Therapy: indefinitely  INR Range:  2.0-3.0     RTC today after being managed in facility on and off. MD will not manage through facility so pt RTC. Per son Pt INR 3/22 1.1. [facility] and [facility] was receiving 6 mg daily.  Pt was then sent back to [facility] and has been getting warfarin pills

## 2022-06-15 ENCOUNTER — TELEPHONE (OUTPATIENT)
Dept: PHARMACY | Age: 87
End: 2022-06-15

## 2022-06-21 ENCOUNTER — TELEPHONE (OUTPATIENT)
Dept: FAMILY MEDICINE CLINIC | Age: 87
End: 2022-06-21

## 2022-06-21 DIAGNOSIS — M41.25 OTHER IDIOPATHIC SCOLIOSIS, THORACOLUMBAR REGION: Primary | ICD-10-CM

## 2022-06-21 RX ORDER — TRAMADOL HYDROCHLORIDE 50 MG/1
TABLET ORAL
Qty: 90 TABLET | Refills: 0 | Status: SHIPPED | OUTPATIENT
Start: 2022-06-21 | End: 2022-07-21

## 2022-06-21 NOTE — TELEPHONE ENCOUNTER
[address] faxed in paper stating Resident right eye red, possible blood vessels burst. Resident denied any pain or any injury to it or discomfort.  Please advise    [address]bs   [phone]    Fax: [phone]    Fax Is attached in

## 2022-06-21 NOTE — TELEPHONE ENCOUNTER
If she is not having any symptoms this is most likely a subconjunctival hemorrhage which is benign and will resolve without treatment.

## 2022-06-21 NOTE — TELEPHONE ENCOUNTER
Medication:   Requested Prescriptions     Pending Prescriptions Disp Refills    traMADol (ULTRAM) 50 MG tablet [Pharmacy Med Name: TRAMADOL HCL 50 MG TABLET] 90 tablet 0     Sig: TAKE 1 TABLET BY MOUTH EVERY 8 HOURS. REDUCE DOSES TAKEN AS PAIN BECOMES MANAGEABLE        Last Filled:  3/25/22 with 2 refills    Patient Phone Number: [phone] (home)     Last appt: 12/28/2021   Next appt: Visit date not found    Last OARRS:   RX Monitoring 12/28/2021   Attestation -   Periodic Controlled Substance Monitoring No signs of potential drug abuse or diversion identified.

## 2022-06-22 ENCOUNTER — ANTI-COAG VISIT (OUTPATIENT)
Dept: PHARMACY | Age: 87
End: 2022-06-22
Payer: MEDICARE

## 2022-06-22 DIAGNOSIS — Z86.711 HISTORY OF PULMONARY EMBOLISM: Primary | ICD-10-CM

## 2022-06-22 LAB — INTERNATIONAL NORMALIZATION RATIO, POC: 2.8

## 2022-06-22 PROCEDURE — 99211 OFF/OP EST MAY X REQ PHY/QHP: CPT

## 2022-06-22 PROCEDURE — 85610 PROTHROMBIN TIME: CPT

## 2022-06-22 NOTE — PROGRESS NOTES
Ms. Paul Alfonso is a 80 y.o. 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 Took Xarelto for the 3 years after her first PE. Then developed \"several small clots\" as it was described. She was then switched to warfarin 5mg as of 12/3/18. Pertinent PMH: HTN, Hx of PE,     Patient Reported Findings:  Yes     No  [x]   []       Patient & son verifies current dosing regimen as listed home nurse that takes care of medications    Daughter accompanied patient today- doesn't do medications, not sure of dosing since has caretaker. She will check when she gets home to confirm dose. --> pt confirms dose --> unable to confirm dose, RN takes care of meds    []   [x]       S/S bleeding/bruising/swelling/SOB- denies   []   [x]       Blood in urine or stool- denies   []   [x]       Procedures scheduled in the future at this time   []   [x]       Missed Dose - denies   []   [x]       Extra Dose- Denies   []   [x]       Change in medications started taking iron and stool softener---> no changes, but unable to verify since pt doesn't manage meds --> denies changes in the past week   []   [x]       Change in health/diet/appetite denies changes, no NVD --> no changes no NVD    []   [x]       Change in alcohol use  []   [x]       Change in activity  []   [x]       Hospital admission  [x]   []       Emergency department visit 1/2 for dizziness. INR 1.9. no med changes made. [x]   []       Other complaints patient living at [facility]:  Yes     No  []   [x]       Major bleeding event  []   [x]       Thromboembolic event    Duration of warfarin Therapy: indefinitely  INR Range:  2.0-3.0     RTC today after being managed in facility on and off. MD will not manage through facility so pt RTC. Per son Pt INR 3/22 1.1. [facility] and [facility] was receiving 6 mg daily.  Pt was then sent back to [facility] and has been getting [facility]farin pills from [facility] given at d/c which believe is 6 mg daily. But per note from [facility] to Dr. Alina Ramírez, pt was receiving 5 mg qd. Presents with son who does not manage warfarin. INR is 2.8 today after dose increase at last two appts   Continue taking 5mg on Mon and 7.5mg all other days. Advised for son to check with RN to ensure dose of warfarin is changed for today   Encouraged to maintain a consistency of vegetables/salads.    Recheck INR in 4 weeks, 7/20 pt unable to come sooner     **Print 2 copies of AVS for facility**    Patient consent signed 11/11/21    Referring is Dr. Sangita Rodriguez   INR (no units)   Date Value   01/02/2022 1.90 (H)   12/20/2021 2.00   12/16/2021 1.80   12/13/2021 1.20     INR,(POC) (no units)   Date Value   05/25/2022 1.9   05/04/2022 1.8   04/21/2022 1.8   04/05/2022 2.5       For Pharmacy Admin Tracking Only     Total # of Interventions Recommended: 0   Total # of Interventions Accepted: 0   Time Spent (min): [address] [facility], PharmD

## 2022-06-24 DIAGNOSIS — M41.25 OTHER IDIOPATHIC SCOLIOSIS, THORACOLUMBAR REGION: ICD-10-CM

## 2022-07-08 ENCOUNTER — TELEPHONE (OUTPATIENT)
Dept: FAMILY MEDICINE CLINIC | Age: 87
End: 2022-07-08

## 2022-07-13 ENCOUNTER — ANTI-COAG VISIT (OUTPATIENT)
Dept: PHARMACY | Age: 87
End: 2022-07-13

## 2022-07-13 RX ORDER — RIOCIGUAT 2.5 MG/1
2.5 TABLET, FILM COATED ORAL 3 TIMES DAILY
Qty: 90 TABLET | Refills: 11 | Status: SHIPPED | OUTPATIENT
Start: 2022-07-13

## 2022-07-21 DIAGNOSIS — M41.25 OTHER IDIOPATHIC SCOLIOSIS, THORACOLUMBAR REGION: ICD-10-CM

## 2022-07-21 RX ORDER — TRAMADOL HYDROCHLORIDE 50 MG/1
TABLET ORAL
Qty: 90 TABLET | Refills: 0 | Status: SHIPPED | OUTPATIENT
Start: 2022-07-21 | End: 2022-08-22

## 2022-07-21 NOTE — TELEPHONE ENCOUNTER
Medication:   Requested Prescriptions     Pending Prescriptions Disp Refills    traMADol (ULTRAM) 50 MG tablet [Pharmacy Med Name: TRAMADOL HCL 50 MG TABLET] 90 tablet 0     Sig: TAKE 1 TABLET BY MOUTH EVERY 6 HOURS        Last Filled:  6/21/2022    Patient Phone Number: [phone] (home)     Last appt: 12/28/2021   Next appt: Visit date not found    Last OARRS:   RX Monitoring 12/28/2021   Attestation -   Periodic Controlled Substance Monitoring No signs of potential drug abuse or diversion identified.

## 2022-08-08 ENCOUNTER — OFFICE VISIT (OUTPATIENT)
Dept: CARDIOLOGY CLINIC | Age: 87
End: 2022-08-08
Payer: MEDICARE

## 2022-08-08 ENCOUNTER — HOSPITAL ENCOUNTER (OUTPATIENT)
Dept: NON INVASIVE DIAGNOSTICS | Age: 87
Discharge: HOME | End: 2022-08-08
Payer: MEDICARE

## 2022-08-08 ENCOUNTER — HOSPITAL ENCOUNTER (OUTPATIENT)
Age: 87
Discharge: HOME | End: 2022-08-08
Payer: MEDICARE

## 2022-08-08 VITALS — SYSTOLIC BLOOD PRESSURE: 120 MMHG | OXYGEN SATURATION: 94 % | HEART RATE: 86 BPM | DIASTOLIC BLOOD PRESSURE: 60 MMHG

## 2022-08-08 DIAGNOSIS — I27.21 PAH (PULMONARY ARTERY HYPERTENSION) (HCC): ICD-10-CM

## 2022-08-08 DIAGNOSIS — I35.0 NONRHEUMATIC AORTIC VALVE STENOSIS: ICD-10-CM

## 2022-08-08 DIAGNOSIS — E03.9 HYPOTHYROIDISM, UNSPECIFIED TYPE: ICD-10-CM

## 2022-08-08 DIAGNOSIS — D50.9 IRON DEFICIENCY ANEMIA, UNSPECIFIED IRON DEFICIENCY ANEMIA TYPE: ICD-10-CM

## 2022-08-08 DIAGNOSIS — R06.02 SOB (SHORTNESS OF BREATH): ICD-10-CM

## 2022-08-08 DIAGNOSIS — I27.24 CTEPH (CHRONIC THROMBOEMBOLIC PULMONARY HYPERTENSION) (HCC): ICD-10-CM

## 2022-08-08 DIAGNOSIS — I27.24 CTEPH (CHRONIC THROMBOEMBOLIC PULMONARY HYPERTENSION) (HCC): Primary | ICD-10-CM

## 2022-08-08 DIAGNOSIS — I10 ESSENTIAL HYPERTENSION: ICD-10-CM

## 2022-08-08 DIAGNOSIS — R60.0 LOWER EXTREMITY EDEMA: ICD-10-CM

## 2022-08-08 LAB
A/G RATIO: 1.6 (ref 1.1–2.2)
ALBUMIN SERPL-MCNC: 3.9 G/DL (ref 3.4–5)
ALP BLD-CCNC: 100 U/L (ref 40–129)
ALT SERPL-CCNC: 11 U/L (ref 10–40)
ANION GAP SERPL CALCULATED.3IONS-SCNC: 12 MMOL/L (ref 3–16)
AST SERPL-CCNC: 15 U/L (ref 15–37)
BILIRUB SERPL-MCNC: 0.3 MG/DL (ref 0–1)
BUN BLDV-MCNC: 10 MG/DL (ref 7–20)
CALCIUM SERPL-MCNC: 9.2 MG/DL (ref 8.3–10.6)
CHLORIDE BLD-SCNC: 102 MMOL/L (ref 99–110)
CO2: 26 MMOL/L (ref 21–32)
CREAT SERPL-MCNC: 0.8 MG/DL (ref 0.6–1.2)
FERRITIN: 64.1 NG/ML (ref 15–150)
GFR AFRICAN AMERICAN: >60
GFR NON-AFRICAN AMERICAN: >60
GLUCOSE BLD-MCNC: 171 MG/DL (ref 70–99)
HCT VFR BLD CALC: 45.3 % (ref 36–48)
HEMOGLOBIN: 14.5 G/DL (ref 12–16)
IRON SATURATION: 16 % (ref 15–50)
IRON: 36 UG/DL (ref 37–145)
LV EF: 68 %
LVEF MODALITY: NORMAL
MCH RBC QN AUTO: 28.2 PG (ref 26–34)
MCHC RBC AUTO-ENTMCNC: 32 G/DL (ref 31–36)
MCV RBC AUTO: 88.1 FL (ref 80–100)
PDW BLD-RTO: 17.2 % (ref 12.4–15.4)
PLATELET # BLD: 295 K/UL (ref 135–450)
PMV BLD AUTO: 9.4 FL (ref 5–10.5)
POTASSIUM SERPL-SCNC: 3.9 MMOL/L (ref 3.5–5.1)
PRO-BNP: 354 PG/ML (ref 0–449)
RBC # BLD: 5.14 M/UL (ref 4–5.2)
SODIUM BLD-SCNC: 140 MMOL/L (ref 136–145)
T4 FREE: 1.7 NG/DL (ref 0.9–1.8)
TOTAL IRON BINDING CAPACITY: 231 UG/DL (ref 260–445)
TOTAL PROTEIN: 6.4 G/DL (ref 6.4–8.2)
TSH REFLEX: 0.74 UIU/ML (ref 0.27–4.2)
WBC # BLD: 6 K/UL (ref 4–11)

## 2022-08-08 PROCEDURE — 1123F ACP DISCUSS/DSCN MKR DOCD: CPT | Performed by: INTERNAL MEDICINE

## 2022-08-08 PROCEDURE — 83880 ASSAY OF NATRIURETIC PEPTIDE: CPT

## 2022-08-08 PROCEDURE — 84443 ASSAY THYROID STIM HORMONE: CPT

## 2022-08-08 PROCEDURE — 83540 ASSAY OF IRON: CPT

## 2022-08-08 PROCEDURE — 84439 ASSAY OF FREE THYROXINE: CPT

## 2022-08-08 PROCEDURE — 99215 OFFICE O/P EST HI 40 MIN: CPT | Performed by: INTERNAL MEDICINE

## 2022-08-08 PROCEDURE — 93306 TTE W/DOPPLER COMPLETE: CPT

## 2022-08-08 PROCEDURE — 83550 IRON BINDING TEST: CPT

## 2022-08-08 PROCEDURE — 85027 COMPLETE CBC AUTOMATED: CPT

## 2022-08-08 PROCEDURE — 82728 ASSAY OF FERRITIN: CPT

## 2022-08-08 PROCEDURE — 80053 COMPREHEN METABOLIC PANEL: CPT

## 2022-08-08 RX ORDER — FUROSEMIDE 20 MG/1
20 TABLET ORAL PRN
Qty: 90 TABLET | Refills: 3 | Status: SHIPPED | OUTPATIENT
Start: 2022-08-08

## 2022-08-08 RX ORDER — LISINOPRIL 2.5 MG/1
2.5 TABLET ORAL DAILY
Qty: 90 TABLET | Refills: 3 | Status: SHIPPED | OUTPATIENT
Start: 2022-08-08

## 2022-08-08 NOTE — PROGRESS NOTES
[facility]  Advanced CHF/Pulmonary Hypertension   Cardiac Evaluation      Liz Guzman  YOB: 1934    Date of Visit:  8/8/22    Chief Complaint   Patient presents with    Shortness of Breath     [address]      History of Present Illness:  Liz Guzman is a 80 y.o. female who presents for hospital follow up from [facility] after presenting on 11/28/18 with leg pain and swelling. She has a history of HTN, hyperlipidemia, pulmonary embolus, thyroid disease who presents to the ED with left posterior knee and calf pain with swelling for the past 2 days. She typically ambulates with a walker and is still able to ambulate. She was found to have hypoxia with oxygen saturations in the high 80's. She denied shortness of breath. Son was is at bedside and he had not noticed her to be short of breath. Since her initial PE was diagnosed, she had been on Xarelto. VQ scan showed high probability of VQ scan and was diagnosed with CTEPH. She was also fluid overloaded during her hospitalization and was diuresed with IV furosemide. She developed ARF and ace-I was held temporarily then dose was reduced. She was not discharged on a diuretic although she was sent home with oxygen. Patient had been on Xarelto sine 2014. Changed to Coumadin by Dr. Ashwini Gallo. She was started on Lasix on 12-12-18 (Mon, Wed and Fri) based off of labs. Her INRs are followed in [facility] clinic. She sees Dr. Marlon Champion for pulmonology. She is moved to [facility]  (assisted living). Today, she is here for regular follow up; she presents in a wheelchair with her son accompanying. She is wearing her oxygen. Her Echo today shows RVSP 47mmHg, EF is 65-70%. Her aortic stenosis is moderatly narrowed no worse than prior Echo. Her Adempas comes to her son's home and he delivers it to her at [facility]. Everything else comes from [facility] and is dispensed by nursing.   Her INR's are adjusted by Te Corbett Librado Ross NP at [facility]. She states she feels fine. Denies chest pain. WHO group IV. Class III. Allergies   Allergen Reactions    Naprosyn [Naproxen] Nausea Only     ABDOMINAL PAIN     Current Outpatient Medications   Medication Sig Dispense Refill    traMADol (ULTRAM) 50 MG tablet TAKE 1 TABLET BY MOUTH EVERY 6 HOURS 90 tablet 0    Riociguat (ADEMPAS) 2.5 MG TABS Take 2.5 mg by mouth 3 times daily 90 tablet 11    FEROSUL 325 (65 Fe) MG tablet TAKE 1 TABLET BY MOUTH TWICE A DAY WITH MEALS 180 tablet 0    levothyroxine (SYNTHROID) 75 MCG tablet TAKE 1 TABLET BY MOUTH EVERY DAY 90 tablet 0    lisinopril (PRINIVIL;ZESTRIL) 2.5 MG tablet TAKE 1 TABLET BY MOUTH EVERY DAY 90 tablet 3    furosemide (LASIX) 20 MG tablet Take one tablet as needed every 12 hours for shortness of breath or swelling. (Patient taking differently: Take 20 mg by mouth as needed For swelling) 30 tablet 11    OXYGEN Inhale 2 L into the lungs continuous      warfarin (COUMADIN) 5 MG tablet Hold for  INR greater  Than 3.0 (Patient taking differently: Hold for  INR greater  Than 3.0  Managed by [facility]) 15 tablet 0    omeprazole (PRILOSEC) 20 MG delayed release capsule Take 20 mg by mouth daily      docusate sodium (COLACE) 100 MG capsule Take 1 capsule by mouth daily as needed for Constipation 30 capsule 2    Multiple Vitamins-Minerals (THERAPEUTIC MULTIVITAMIN-MINERALS) tablet Take 1 tablet by mouth daily      Ascorbic Acid (VITAMIN C) 500 MG tablet Take 500 mg by mouth daily       No current facility-administered medications for this visit.        Past Medical History:   Diagnosis Date    Arthritis     Hyperlipidemia     Hypertension     Hypothyroidism     Lumbar herniated disc     Movement disorder     scoliosis    PAH (pulmonary arterial hypertension) with portal hypertension ([address]     Pulmonary emboli ([address] 12/2014    Scoliosis     Scoliosis     Thyroid disease     hypothyroid     Past Surgical History:   Procedure Laterality Date    COLONOSCOPY  7/08    EXCISION / BIOPSY SKIN LESION OF ARM Left 9/18/2019    EXCISION LEFT FOREARM MASS performed by Olesya Candelario MD at [facility]      both knees    NECK SURGERY Right 9/18/2019    EXCISION OF RIGHT NECK MASS performed by Olesya Candelario MD at [address] [place] [address],[address] [facility] History   Problem Relation Age of Onset    High Cholesterol Mother     High Cholesterol Father     Cancer Brother      Social History     Socioeconomic History    Marital status:      Spouse name: Not on file    Number of children: 8    Years of education: 12    Highest education level: Not on file   Occupational History    Not on file   Tobacco Use    Smoking status: Former     Packs/day: 0.25     Years: 15.00     Pack years: 3.75     Types: Cigarettes    Smokeless tobacco: Former     Quit date: 12/27/1965   Vaping Use    Vaping Use: Never used   Substance and Sexual Activity    Alcohol use: No    Drug use: No    Sexual activity: Not Currently   Other Topics Concern    Not on file   Social History Narrative    Not on file     Social Determinants of Health     Financial Resource Strain: Low Risk     Difficulty of Paying Living Expenses: Not hard at all   Food Insecurity: No Food Insecurity    Worried About Running Out of Food in the Last Year: Never true    Ran Out of Food in the Last Year: Never true   Transportation Needs: Not on file   Physical Activity: Not on file   Stress: Not on file   Social Connections: Not on file   Intimate Partner Violence: Not on file   Housing Stability: Not on file     Review of Systems:   Constitutional: there has been no unanticipated weight loss. There's been no change in energy level, sleep pattern, or activity level. Eyes: No visual changes or diplopia. No scleral icterus. ENT: No Headaches, hearing loss or vertigo. No mouth sores or sore throat. Cardiovascular: Reviewed in HPI  Respiratory: No cough or wheezing, no sputum production.  No hematemesis. Gastrointestinal: No abdominal pain, appetite loss, blood in stools. No change in bowel or bladder habits. Genitourinary: No dysuria, trouble voiding, or hematuria. Musculoskeletal:  No gait disturbance, weakness or joint complaints. Integumentary: No rash or pruritis. Neurological: No headache, diplopia, change in muscle strength, numbness or tingling. No change in gait, balance, coordination, mood, affect, memory, mentation, behavior. Psychiatric: No anxiety, no depression. Endocrine: No malaise, fatigue or temperature intolerance. No excessive thirst, fluid intake, or urination. No tremor. Hematologic/Lymphatic: No abnormal bruising or bleeding, blood clots or swollen lymph nodes. Allergic/Immunologic: No nasal congestion or hives. Physical Examination:    There were no vitals filed for this visit. There is no height or weight on file to calculate BMI. Wt Readings from Last 3 Encounters:   02/02/22 150 lb (68 kg)   01/02/22 150 lb (68 kg)   11/25/21 150 lb 4.8 oz (68.2 kg)     BP Readings from Last 3 Encounters:   02/02/22 130/70   01/02/22 (!) 147/78   11/25/21 124/73     Constitutional and General Appearance: Elderly frail pale   WD/WN in NAD, wears oxygen 24/7  HEENT:  NC/AT  [place]  No problems with hearing  Skin:  Warm, dry  Respiratory:  Normal excursion and expansion without use of accessory muscles  Resp Auscultation: Normal breath sounds without dullness  Cardiovascular: The apical impulses not displaced  Heart tones are crisp and normal  Cervical veins are not engorged  The carotid upstroke is normal in amplitude and contour without delay or bruit  JVP less than 8 cm H2O  RRR with nl S1 and S2 without m,r,g  Peripheral pulses are symmetrical and full  There is no clubbing, cyanosis of the extremities.   Mild edema  Femoral Arteries: 2+ and equal  Pedal Pulses: 2+ and equal   Neck:  No thyromegaly  Abdomen:  No masses or tenderness  Liver/Spleen: No Abnormalities Noted  Neurological/Psychiatric:  Alert and oriented in all spheres  Moves all extremities well  Exhibits normal gait balance and coordination  No abnormalities of mood, affect, memory, mentation, or behavior are noted    Recent hospitalization and testin minute walk 19 at 1:45pm.     Completed 4 laps. 73.2 meters walked/240 feet. Walked 4 minutes  [place]  118/78  74  Pulse ox  92% on 2 liters NC    Immediate post walk   142/80  116  91% on 2 liters NC    5 minutes post walk  96/76  105  93% on 2 liters NC    ECHO 2019  Summary   -Normal left ventricle size and wall thickness. Hyperdynamic systolic   function with an estimated ejection fraction of 65-70%. No regional wall   motion abnormalities are seen. E/e\"= 9.4 .   -Grade I diastolic dysfunction with normal LV filling pressures. -Moderate aortic stenosis with a peak gradient of 37 mmHg and a mean   pressure gradient of 19mmHg. -Mild mitral regurgitation.   -Mild to moderate tricuspid regurgitation.   -The right atrium is mildly dilated. -Estimated pulmonary artery systolic pressure is severely elevated at 77   mmHg assuming a right atrial pressure of 3 mmHg. Carotid duplex 49  YINKA < 49%, LICA - no significant stenosis    VQ scan 18  High probability for PE     Echo 18   -Normal left ventricle size, wall thickness, and systolic function with EF 70% 3.02m/s and a mean gradient 25 mmHg. The aortic valve area - 1.08 cm^2. No significant regurgitation noted. -Mild-to-moderate tricuspid regurgitation with a RVSP estimation of 70 mmHg, suggestive of severe pulm HTN.    -Normal diastolic function. E/e'=10.3    CT chest pulmonary 18 : no evidence or proximal PE but enlarged main pulmonary artery which can be seen with pulmonary HTN    Left venous dulex 18 : no evidence of deep vein or superficial vein thrombosis involving LLE or right common femoral vein.       Labs were reviewed including labs from other hospital systems through [facility]. Cardiac testing was reviewed including echos, nuclear scans, cardiac catheterization, including from other hospital systems through [facility]. Assessment:    1. CTEPH (chronic thromboembolic pulmonary hypertension) [address]    2. Essential hypertension    3. PAH (pulmonary artery hypertension) ([facility]    4. Nonrheumatic aortic valve stenosis    5. SOB (shortness of breath)    6. Iron deficiency anemia, unspecified iron deficiency anemia type    7. Hypothyroidism, unspecified type    8. Lower extremity edema      1. CTEPH (chronic thromboembolic pulmonary hypertension): Stable. Using her oxygen. -PASP increased to 77mmHg. Started Adempas 0.5mg three times a day Oct 2019. Side effects explained to patient and son Catrina Casiano. May need to decrease or DC Norvasc for benefits of Adempas. Uptitrate as tolerated with help of home health.     -Now on Adempas 2.5mg TID (has been on sildenafil until next shipment of Ademps arrives). -Echo on 2/20/19  With pulmonary artery systolic pressure is severely elevated at 77 mmHg assuming a right atrial pressure of 3 mmHg.   -Echo 9/30/20> RVSP of 25mmHg. 2. Shortness of breath, chronic:  Stable. Using oxygen. 3. Iron deficiency anemia, h/o:  Diagnosed with labs done after office. 1/2/22> H&H 13.6 / 41.4      4. Other pulmonary embolism without acute cor pulmonale, unspecified chronicity:  -Stable. Continue Coumadin as directed. 5. Essential hypertension: There were no vitals taken for this visit. -Stable. Denies dizziness. 6. Nonrheumatic aortic valve stenosis: Stable      Plan:  1. Continue Adempas 2.5 mg po tid as before. This is for pulmonary artery hypertension. She has a patient assistance Fund. 2. Continue same medications. 3.  See Dr. Norm Cho in 6 months   4.  Echo today is stable. Scribe's attestation:   This note was scribed in the presence of Quyen Mena M.D. by Marga Luong RN     The sandra's documentation has been prepared under my direction and personally reviewed by me in its entirety. I confirm that the note above accurately reflects all work, treatment, procedures, and medical decision making performed by me. Time Based Itemization  A total of 40 minutes was spent on today's patient encounter. If applicable, non-patient-facing activities:  ( x)Preparing to see the patient and reviewing records  ( x) Individual interpretation of results  ( ) Discussion or coordination of care with other health care professionals  ( x) Ordering of unique tests, medications, or procedures  ( x) Documentation within the EHR      I appreciate the opportunity of cooperating in the care of this patient.     Angeles Aparicio M.D., [facility]

## 2022-08-11 ENCOUNTER — TELEPHONE (OUTPATIENT)
Dept: CARDIOLOGY CLINIC | Age: 87
End: 2022-08-11

## 2022-08-11 NOTE — TELEPHONE ENCOUNTER
----- Message from Melodie Christian MD sent at 8/11/2022  1:45 PM EDT -----  Please call the patient and make sure she gets this message. Marcelo Castaneda, your labs look good. Your iron level is getting low again, but you are not anemic. Please make sure you are taking the iron pill twice a day. Otherwise no changes.   Melodie Christian

## 2022-08-11 NOTE — TELEPHONE ENCOUNTER
No answer no VM    However this information was seen via [facility]    Seen by patient Venango Niurka on 8/11/2022  2:53 PM

## 2022-08-19 DIAGNOSIS — M41.25 OTHER IDIOPATHIC SCOLIOSIS, THORACOLUMBAR REGION: ICD-10-CM

## 2022-08-22 RX ORDER — TRAMADOL HYDROCHLORIDE 50 MG/1
TABLET ORAL
Qty: 90 TABLET | Refills: 0 | Status: SHIPPED | OUTPATIENT
Start: 2022-08-22 | End: 2022-09-23 | Stop reason: SDUPTHER

## 2022-08-22 NOTE — TELEPHONE ENCOUNTER
Medication:   Requested Prescriptions     Pending Prescriptions Disp Refills    traMADol (ULTRAM) 50 MG tablet [Pharmacy Med Name: TRAMADOL HCL 50 MG TABLET] 90 tablet 0     Sig: TAKE 1 TABLET BY MOUTH EVERY 6 HOURS *REDUCE DOSES TAKEN AS PAIN BECOMES MANAGEABLE*        Last Filled:  80  x0 rf 7/21/22    Patient Phone Number: [phone] (home)     Last appt: 12/28/2021   Next appt: Visit date not found    Last OARRS:   RX Monitoring 12/28/2021   Attestation -   Periodic Controlled Substance Monitoring No signs of potential drug abuse or diversion identified.

## 2022-09-13 DIAGNOSIS — M41.25 OTHER IDIOPATHIC SCOLIOSIS, THORACOLUMBAR REGION: ICD-10-CM

## 2022-09-13 RX ORDER — TRAMADOL HYDROCHLORIDE 50 MG/1
TABLET ORAL
Qty: 90 TABLET | Refills: 5 | OUTPATIENT
Start: 2022-09-13 | End: 2022-10-13

## 2022-09-13 NOTE — TELEPHONE ENCOUNTER
Medication:   Requested Prescriptions     Pending Prescriptions Disp Refills    traMADol (ULTRAM) 50 MG tablet [Pharmacy Med Name: TRAMADOL HCL 50 MG TABLET] 90 tablet 5     Sig: TAKE 1 TABLET BY MOUTH EVERY 6 HOURS *REDUCE DOSES TAKEN AS PAIN BECOMES MANAGEABLE*        Last Filled:   90 x 0 RF 8/22/22    Patient Phone Number: [phone] (home)     Last appt: 12/28/2021   Next appt: Visit date not found    Last OARRS:   RX Monitoring 12/28/2021   Attestation -   Periodic Controlled Substance Monitoring No signs of potential drug abuse or diversion identified.

## 2022-09-23 DIAGNOSIS — M41.25 OTHER IDIOPATHIC SCOLIOSIS, THORACOLUMBAR REGION: ICD-10-CM

## 2022-09-23 RX ORDER — TRAMADOL HYDROCHLORIDE 50 MG/1
50 TABLET ORAL 2 TIMES DAILY PRN
Qty: 60 TABLET | Refills: 0 | Status: SHIPPED | OUTPATIENT
Start: 2022-09-23 | End: 2022-10-23

## 2022-09-23 NOTE — TELEPHONE ENCOUNTER
Medication:   Requested Prescriptions     Pending Prescriptions Disp Refills    traMADol (ULTRAM) 50 MG tablet 90 tablet 0        Last Filled:  8/22/2022    Patient Phone Number: [phone] (home)     Last appt: 12/28/2021   Next appt: Visit date not found    Last OARRS:   RX Monitoring 12/28/2021   Attestation -   Periodic Controlled Substance Monitoring No signs of potential drug abuse or diversion identified.

## 2022-09-23 NOTE — TELEPHONE ENCOUNTER
Medication and Quantity requested:  traMADol (ULTRAM) 50 MG tablet      Last Visit  12/28/21 - Dr Jalyn Kruse and phone number updated in EPIC:  yes    [facility]

## 2022-09-23 NOTE — TELEPHONE ENCOUNTER
Spoke with Radha Velazquez (child) and he stated pt is at [facility]. He stated pt doesn't depend on Tramadol she just uses it when arthritis flares up. He stated when that happens the most she a take is two daily.

## 2022-10-12 RX ORDER — VIT A/VIT C/VIT E/ZINC/COPPER 2148-113
TABLET ORAL
Qty: 30 TABLET | Refills: 11 | Status: SHIPPED | OUTPATIENT
Start: 2022-10-12

## 2022-10-12 NOTE — TELEPHONE ENCOUNTER
Last appt: 12/28/2021   Next appt: Visit date not found    Last OARRS:   RX Monitoring 12/28/2021   Attestation -   Periodic Controlled Substance Monitoring No signs of potential drug abuse or diversion identified.

## 2022-11-01 DIAGNOSIS — M41.25 OTHER IDIOPATHIC SCOLIOSIS, THORACOLUMBAR REGION: ICD-10-CM

## 2022-11-01 RX ORDER — TRAMADOL HYDROCHLORIDE 50 MG/1
50 TABLET ORAL EVERY 6 HOURS PRN
Qty: 90 TABLET | Refills: 0 | Status: SHIPPED | OUTPATIENT
Start: 2022-11-01 | End: 2022-12-01

## 2022-12-27 RX ORDER — LEVOTHYROXINE SODIUM 0.07 MG/1
TABLET ORAL
Qty: 30 TABLET | Refills: 3 | Status: SHIPPED | OUTPATIENT
Start: 2022-12-27

## 2022-12-27 NOTE — TELEPHONE ENCOUNTER
Medication:   Requested Prescriptions     Pending Prescriptions Disp Refills    levothyroxine (SYNTHROID) 75 MCG tablet [Pharmacy Med Name: LEVOTHYROXINE 75 MCG TABLET] 30 tablet 11     Sig: TAKE 1 TABLET BY MOUTH ONCE DAILY.        Last Filled:  12/9/2021    Patient Phone Number: [phone] (home)     Last appt: 12/28/2021   Next appt: Visit date not found    Last Thyroid:   Lab Results   Component Value Date/Time    TSH 5.17 09/30/2021 11:41 AM    T4FREE 1.7 08/08/2022 01:15 PM    P1VMVKA 9.5 09/30/2021 11:41 AM

## 2023-02-10 ENCOUNTER — OFFICE VISIT (OUTPATIENT)
Dept: CARDIOLOGY CLINIC | Age: 88
End: 2023-02-10

## 2023-02-10 VITALS — HEART RATE: 92 BPM | SYSTOLIC BLOOD PRESSURE: 100 MMHG | OXYGEN SATURATION: 97 % | DIASTOLIC BLOOD PRESSURE: 54 MMHG

## 2023-02-10 DIAGNOSIS — R06.02 SOB (SHORTNESS OF BREATH): ICD-10-CM

## 2023-02-10 DIAGNOSIS — I27.24 CTEPH (CHRONIC THROMBOEMBOLIC PULMONARY HYPERTENSION) (HCC): ICD-10-CM

## 2023-02-10 DIAGNOSIS — I27.21 PAH (PULMONARY ARTERY HYPERTENSION) (HCC): Primary | ICD-10-CM

## 2023-02-10 DIAGNOSIS — I35.0 NONRHEUMATIC AORTIC VALVE STENOSIS: ICD-10-CM

## 2023-02-10 DIAGNOSIS — I10 ESSENTIAL HYPERTENSION: ICD-10-CM

## 2023-02-10 DIAGNOSIS — D50.9 IRON DEFICIENCY ANEMIA, UNSPECIFIED IRON DEFICIENCY ANEMIA TYPE: ICD-10-CM

## 2023-02-10 RX ORDER — TRAMADOL HYDROCHLORIDE 50 MG/1
50 TABLET ORAL EVERY 6 HOURS PRN
COMMUNITY

## 2023-02-10 NOTE — PATIENT INSTRUCTIONS
Plan:  1. Labs soon at [facility]. Continue Adempas and all other medications. .   2. Continue same medications.    3.   See Dr. Lion Ashton in 9 months

## 2023-02-10 NOTE — PROGRESS NOTES
[address]  Advanced CHF/Pulmonary Hypertension   Cardiac Evaluation      Jose Miguel Diaz  YOB: 1934    Date of Visit:  2/10/23    Chief Complaint   Patient presents with    Shortness of Breath     [address]        History of Present Illness:  Jose Miguel Diaz is a 80 y.o. female who presents for hospital follow up from [facility] after presenting on 11/28/18 with leg pain and swelling. She has a history of HTN, hyperlipidemia, pulmonary embolus, thyroid disease who presents to the ED with left posterior knee and calf pain with swelling for the past 2 days. She typically ambulates with a walker and is still able to ambulate. She was found to have hypoxia with oxygen saturations in the high 80's. She denied shortness of breath. Son was is at bedside and he had not noticed her to be short of breath. Since her initial PE was diagnosed, she had been on Xarelto. VQ scan showed high probability of VQ scan and was diagnosed with CTEPH. She was also fluid overloaded during her hospitalization and was diuresed with IV furosemide. She developed ARF and ace-I was held temporarily then dose was reduced. She was not discharged on a diuretic although she was sent home with oxygen. Patient had been on Xarelto sine 2014. Changed to Coumadin by Dr. Micheal Benítez. She was started on Lasix on 12-12-18 (Mon, Wed and Fri) based off of labs. Her INRs are followed in [facility] clinic. She sees Dr. Kayce Gr for pulmonology. She has moved to [facility]  (assisted living). Her Echo shows RVSP 47mmHg, EF is 65-70%. Her aortic stenosis is moderatly narrowed no worse than prior Echo. Her Adempas comes to her son's home and he delivers it to her at [facility]. Meds are dispensed by nursing. Her INR's are adjusted by Jen Tuttle NP at [facility]. Today, she is here for regular follow up for [address]. She is not wearing oxygen. Discussed getting blood work. She is here with her son.  She wears oxygen at [facility]. WHO group IV. Class III. Allergies   Allergen Reactions    Naprosyn [Naproxen] Nausea Only     ABDOMINAL PAIN     Current Outpatient Medications   Medication Sig Dispense Refill    Lactobacillus (ACIDOPHILUS PO) Take by mouth      traMADol (ULTRAM) 50 MG tablet Take 50 mg by mouth every 6 hours as needed for Pain.      levothyroxine (SYNTHROID) 75 MCG tablet TAKE 1 TABLET BY MOUTH ONCE DAILY. 30 tablet 3    Multiple Vitamins-Minerals (PRESERVISION AREDS) TABS TAKE 1 TABLET BY MOUTH ONCE DAILY. 30 tablet 11    lisinopril (PRINIVIL;ZESTRIL) 2.5 MG tablet Take 1 tablet by mouth in the morning. 90 tablet 3    furosemide (LASIX) 20 MG tablet Take 1 tablet by mouth as needed (take for swelling) 90 tablet 3    Riociguat (ADEMPAS) 2.5 MG TABS Take 2.5 mg by mouth 3 times daily 90 tablet 11    FEROSUL 325 (65 Fe) MG tablet TAKE 1 TABLET BY MOUTH TWICE A DAY WITH MEALS 180 tablet 0    warfarin (COUMADIN) 5 MG tablet Hold for  INR greater  Than 3.0 (Patient taking differently: Hold for  INR greater  Than 3.0  Managed by [facility]) 15 tablet 0    omeprazole (PRILOSEC) 20 MG delayed release capsule Take 20 mg by mouth daily      Ascorbic Acid (VITAMIN C) 500 MG tablet Take 500 mg by mouth daily      OXYGEN Inhale 2 L into the lungs continuous (Patient not taking: Reported on 2/10/2023)      docusate sodium (COLACE) 100 MG capsule Take 1 capsule by mouth daily as needed for Constipation 30 capsule 2     No current facility-administered medications for this visit.        Past Medical History:   Diagnosis Date    Arthritis     Hyperlipidemia     Hypertension     Hypothyroidism     Lumbar herniated disc     Movement disorder     scoliosis    PAH (pulmonary arterial hypertension) with portal hypertension ([address]     Pulmonary emboli ([address] 12/2014    Scoliosis     Scoliosis     Thyroid disease     hypothyroid     Past Surgical History:   Procedure Laterality Date    COLONOSCOPY  7/08 EXCISION / BIOPSY SKIN LESION OF ARM Left 9/18/2019    EXCISION LEFT FOREARM MASS performed by Apryl Potter MD at [facility]       both knees    NECK SURGERY Right 9/18/2019    EXCISION OF RIGHT NECK MASS performed by Apryl Potter MD at [address] [facility] [facility] History   Problem Relation Age of Onset    High Cholesterol Mother     High Cholesterol Father     Cancer Brother      Social History     Socioeconomic History    Marital status:      Spouse name: Not on file    Number of children: 8    Years of education: 12    Highest education level: Not on file   Occupational History    Not on file   Tobacco Use    Smoking status: Former     Packs/day: 0.25     Years: 15.00     Pack years: 3.75     Types: Cigarettes    Smokeless tobacco: Former     Quit date: 12/27/1965   Vaping Use    Vaping Use: Never used   Substance and Sexual Activity    Alcohol use: No    Drug use: No    Sexual activity: Not Currently   Other Topics Concern    Not on file   Social History Narrative    Not on file     Social Determinants of Health     Financial Resource Strain: Not on file   Food Insecurity: Not on file   Transportation Needs: Not on file   Physical Activity: Not on file   Stress: Not on file   Social Connections: Not on file   Intimate Partner Violence: Not on file   Housing Stability: Not on file     Review of Systems:   Constitutional: there has been no unanticipated weight loss. There's been no change in energy level, sleep pattern, or activity level. Eyes: No visual changes or diplopia. No scleral icterus. ENT: No Headaches, hearing loss or vertigo. No mouth sores or sore throat. Cardiovascular: Reviewed in HPI  Respiratory: No cough or wheezing, no sputum production. No hematemesis. Gastrointestinal: No abdominal pain, appetite loss, blood in stools. No change in bowel or bladder habits. Genitourinary: No dysuria, trouble voiding, or hematuria.   Musculoskeletal:  No gait disturbance, weakness or joint complaints. Integumentary: No rash or pruritis. Neurological: No headache, diplopia, change in muscle strength, numbness or tingling. No change in gait, balance, coordination, mood, affect, memory, mentation, behavior. Psychiatric: No anxiety, no depression. Endocrine: No malaise, fatigue or temperature intolerance. No excessive thirst, fluid intake, or urination. No tremor. Hematologic/Lymphatic: No abnormal bruising or bleeding, blood clots or swollen lymph nodes. Allergic/Immunologic: No nasal congestion or hives. Physical Examination:    Vitals:    02/10/23 1016   BP: (!) 100/54   Pulse: 92   SpO2: 97%       There is no height or weight on file to calculate BMI. Wt Readings from Last 3 Encounters:   02/02/22 150 lb (68 kg)   01/02/22 150 lb (68 kg)   11/25/21 150 lb 4.8 oz (68.2 kg)     BP Readings from Last 3 Encounters:   02/10/23 (!) 100/54   08/08/22 120/60   02/02/22 130/70     Constitutional and General Appearance: Elderly frail pale. In wheel chair. WD/WN in NAD, wears oxygen 24/7  HEENT:  NC/AT  [place]  No problems with hearing  Skin:  Warm, dry  Respiratory:  Normal excursion and expansion without use of accessory muscles  Resp Auscultation: Normal breath sounds without dullness  Cardiovascular: The apical impulses not displaced  Heart tones are crisp and normal  Cervical veins are not engorged  The carotid upstroke is normal in amplitude and contour without delay or bruit  JVP less than 8 cm H2O  RRR with nl S1 and S2 without m,r,g  Peripheral pulses are symmetrical and full  There is no clubbing, cyanosis of the extremities.   Mild edema  Femoral Arteries: 2+ and equal  Pedal Pulses: 2+ and equal   Neck:  No thyromegaly  Abdomen:  No masses or tenderness  Liver/Spleen: No Abnormalities Noted  Neurological/Psychiatric:  Alert and oriented in all spheres  Moves all extremities well  Exhibits normal gait balance and coordination  No abnormalities of mood, affect, memory, mentation, or behavior are noted    Recent hospitalization and testing:  Echo 8/8/22   Normal left ventricle size, wall thickness, and systolic function with an   estimated ejection fraction of 65-70%. No regional wall motion abnormalities are seen. Moderate aortic stenosis with a peak velocity of 3.28m/s and a mean pressure   gradient of 23mmHg. No significant aortic insufficiency. Mild tricuspid regurgitation. RVSP estimated to be 47 mmHg. Normal diastolic function. Avg E/e' estimated to be 10.6.    6 minute walk 2/20/19 at 1:45pm.     Completed 4 laps. 73.2 meters walked/240 feet. Walked 4 minutes  [place]  118/78  74  Pulse ox  92% on 2 liters NC    Immediate post walk   142/80  116  91% on 2 liters NC    5 minutes post walk  96/76  105  93% on 2 liters NC    ECHO 2/20/2019  Summary   -Normal left ventricle size and wall thickness. Hyperdynamic systolic   function with an estimated ejection fraction of 65-70%. No regional wall   motion abnormalities are seen. E/e\"= 9.4 .   -Grade I diastolic dysfunction with normal LV filling pressures. -Moderate aortic stenosis with a peak gradient of 37 mmHg and a mean   pressure gradient of 19mmHg. -Mild mitral regurgitation.   -Mild to moderate tricuspid regurgitation.   -The right atrium is mildly dilated. -Estimated pulmonary artery systolic pressure is severely elevated at 77   mmHg assuming a right atrial pressure of 3 mmHg. Carotid duplex 75/1/98  YINKA < 77%, LICA - no significant stenosis    VQ scan 11/30/18  High probability for PE     Echo 11/29/18   -Normal left ventricle size, wall thickness, and systolic function with EF 70% 3.02m/s and a mean gradient 25 mmHg. The aortic valve area - 1.08 cm^2. No significant regurgitation noted. -Mild-to-moderate tricuspid regurgitation with a RVSP estimation of 70 mmHg, suggestive of severe pulm HTN.    -Normal diastolic function.  E/e'=10.3    CT chest pulmonary 11/28/18 : no evidence or proximal PE but enlarged main pulmonary artery which can be seen with pulmonary HTN    Left venous dulex 11/28/18 : no evidence of deep vein or superficial vein thrombosis involving LLE or right common femoral vein. Labs were reviewed including labs from other hospital systems through [facility]. Cardiac testing was reviewed including echos, nuclear scans, cardiac catheterization, including from other hospital systems through [facility]. Assessment:    1. PAH (pulmonary artery hypertension) ([facility]    2. Essential hypertension    3. Nonrheumatic aortic valve stenosis    4. CTEPH (chronic thromboembolic pulmonary hypertension) ([facility]    5. SOB (shortness of breath)    6. Iron deficiency anemia, unspecified iron deficiency anemia type        1. CTEPH (chronic thromboembolic pulmonary hypertension): Stable. Using her oxygen. -PASP increased to 77mmHg. Started Adempas 0.5mg three times a day Oct 2019. Side effects explained to patient and son Lena Gomez. May need to decrease or DC Norvasc for benefits of Adempas. Uptitrate as tolerated with help of home health.     -Now on Adempas 2.5mg TID (has been on sildenafil until next shipment of Ademps arrives). -Echo on 2/20/19  With pulmonary artery systolic pressure is severely elevated at 77 mmHg assuming a right atrial pressure of 3 mmHg. ~Echo 9/30/20> RVSP of 25mmHg. ~Echo 8/8/22 > RVSP 47mmHg     2. Shortness of breath, chronic:  Stable. Using oxygen. 3. Iron deficiency anemia, h/o:  Diagnosed with labs done after office. 1/2/22> H&H 13.6 / 41.4      4. Other pulmonary embolism without acute cor pulmonale, unspecified chronicity:  -Stable. Continue Coumadin as directed. 5. Essential hypertension: BP (!) 100/54   Pulse 92   SpO2 97%    -Stable. Denies dizziness. 6. Nonrheumatic aortic valve stenosis: Stable      Plan:  1. Labs soon at [facility]. Continue Adempas and all other medications. .   2.    Continue same medications. 3.   See Dr. Nancy Villa in 9 months     Scribe's attestation: This note was scribed in the presence of Danay Siu M.D. by Flo Lawrence RN        The scribe's documentation has been prepared under my direction and personally reviewed by me in its entirety. I confirm that the note above accurately reflects all work, treatment, procedures, and medical decision making performed by me. Time Based Itemization  A total of 40 minutes was spent on today's patient encounter. If applicable, non-patient-facing activities:  ( x)Preparing to see the patient and reviewing records  ( x) Individual interpretation of results  ( ) Discussion or coordination of care with other health care professionals  ( x) Ordering of unique tests, medications, or procedures  ( x) Documentation within the EHR      I appreciate the opportunity of cooperating in the care of this patient.     Danay Siu M.D., [facility]

## 2023-02-15 NOTE — PROGRESS NOTES
Ms. Danita Morrison is a 80 y.o. 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 Took Xarelto for the 3 years after her first PE. Then developed \"several small clots\" as it was described. She was then switched to warfarin 5mg as of 12/3/18. Pertinent PMH: HTN, Hx of PE,     Patient Reported Findings:  Yes     No  [x]   []       Patient & son verifies current dosing regimen as listed home nurse that takes care of medications    Daughter accompanied patient today- doesn't do medications, not sure of dosing since has caretaker. She will check when she gets home to confirm dose. --> pt confirms dose    []   [x]       S/S bleeding/bruising/swelling/SOB- denies ---> fell 5/9, hit head, spoke with doctor because refused ER so went to doc and was assessed- fine---> denies   []   [x]       Blood in urine or stool- denies   []   [x]       Procedures scheduled in the future at this time   [x]   []       Missed Dose-  Missed some doses over the last week per son, unsure how many and which days. -->  Has missed a few doses in the past month, most recently last night and once last week ---> missed yesterday---> denies ---> missed Sunday---> denies      []   [x]       Extra Dose- Denies   []   [x]       Change in medications started taking iron and stool softener---> no changes---> abx for UTI, doesn't know name, done with it for >1 week---> denies   []   [x]       Change in health/diet/appetite denies changes, no NVD   []   [x]       Change in alcohol use  []   [x]       Change in activity  []   [x]       Hospital admission  []   [x]       Emergency department visit  []   [x]       Other complaints     Clinical Outcomes:  Yes     No  []   [x]       Major bleeding event  []   [x]       Thromboembolic event    Duration of warfarin Therapy: indefinitely  INR Range:  2.0-3.0     Presents with son today.      INR is 2.7 today   Continue weekly dose of 10mg on Mon and 7.5 mg all other days of the week. Encouraged to maintain a consistency of vegetables/salads. RF called into [facility].   Recheck INR in 6 weeks, 9/30    Referring is Dr. Stoney Capone   INR (no units)   Date Value   08/19/2021 2.7   07/08/2021 1.8   05/27/2021 3.0   04/22/2021 2.2   05/28/2020 2.82 (H)   05/14/2020 1.40 (H)   04/30/2020 1.03   03/27/2020 1.99 (H)     For Pharmacy Admin Tracking Only     Intervention Detail: Refill(s) Provided   Total # of Interventions Recommended: 1   Total # of Interventions Accepted: 1   Time Spent (min): [id number] LUKE Tran [facility], PharmD 73

## 2023-03-17 RX ORDER — OMEPRAZOLE 20 MG/1
20 CAPSULE, DELAYED RELEASE ORAL DAILY
Qty: 90 CAPSULE | Refills: 0 | OUTPATIENT
Start: 2023-03-17

## 2023-03-17 NOTE — TELEPHONE ENCOUNTER
Medication and Quantity requested:    omeprazole (PRILOSEC) 20 MG delayed release capsule [[id number]]       42 day supply       Last Visit  02/10/2023      Pharmacy and phone number updated in EPIC:  yes    misha

## 2023-04-13 ENCOUNTER — HOSPITAL ENCOUNTER (INPATIENT)
Age: 88
LOS: 4 days | Discharge: NURSING, REHAB OR LONG-TERM CARE | DRG: 854 | End: 2023-04-17
Attending: EMERGENCY MEDICINE | Admitting: INTERNAL MEDICINE
Payer: MEDICARE

## 2023-04-13 ENCOUNTER — APPOINTMENT (OUTPATIENT)
Dept: GENERAL RADIOLOGY | Age: 88
DRG: 854 | End: 2023-04-13
Payer: MEDICARE

## 2023-04-13 ENCOUNTER — APPOINTMENT (OUTPATIENT)
Dept: CT IMAGING | Age: 88
DRG: 854 | End: 2023-04-13
Payer: MEDICARE

## 2023-04-13 DIAGNOSIS — A41.9 SEPSIS, DUE TO UNSPECIFIED ORGANISM, UNSPECIFIED WHETHER ACUTE ORGAN DYSFUNCTION PRESENT (HCC): Primary | ICD-10-CM

## 2023-04-13 DIAGNOSIS — N20.1 URETERIC STONE: ICD-10-CM

## 2023-04-13 PROBLEM — N13.9 OBSTRUCTIVE UROPATHY: Status: ACTIVE | Noted: 2023-04-13

## 2023-04-13 PROBLEM — N20.0 KIDNEY STONE: Status: ACTIVE | Noted: 2023-04-13

## 2023-04-13 LAB
ALBUMIN SERPL-MCNC: 3.6 G/DL (ref 3.4–5)
ALBUMIN/GLOB SERPL: 1.2 {RATIO} (ref 1.1–2.2)
ALP SERPL-CCNC: 132 U/L (ref 40–129)
ALT SERPL-CCNC: 25 U/L (ref 10–40)
ANION GAP SERPL CALCULATED.3IONS-SCNC: 10 MMOL/L (ref 3–16)
AST SERPL-CCNC: 34 U/L (ref 15–37)
BACTERIA URNS QL MICRO: ABNORMAL /HPF
BASOPHILS # BLD: 0.1 K/UL (ref 0–0.2)
BASOPHILS NFR BLD: 0.3 %
BILIRUB SERPL-MCNC: 0.8 MG/DL (ref 0–1)
BILIRUB UR QL STRIP.AUTO: NEGATIVE
BUN SERPL-MCNC: 18 MG/DL (ref 7–20)
CALCIUM SERPL-MCNC: 8.9 MG/DL (ref 8.3–10.6)
CHLORIDE SERPL-SCNC: 107 MMOL/L (ref 99–110)
CLARITY UR: ABNORMAL
CO2 SERPL-SCNC: 27 MMOL/L (ref 21–32)
COLOR UR: YELLOW
CREAT SERPL-MCNC: 0.8 MG/DL (ref 0.6–1.2)
DEPRECATED RDW RBC AUTO: 15.8 % (ref 12.4–15.4)
EOSINOPHIL # BLD: 0 K/UL (ref 0–0.6)
EOSINOPHIL NFR BLD: 0 %
EPI CELLS #/AREA URNS AUTO: 0 /HPF (ref 0–5)
GFR SERPLBLD CREATININE-BSD FMLA CKD-EPI: >60 ML/MIN/{1.73_M2}
GLUCOSE SERPL-MCNC: 127 MG/DL (ref 70–99)
GLUCOSE UR STRIP.AUTO-MCNC: NEGATIVE MG/DL
HCT VFR BLD AUTO: 43.9 % (ref 36–48)
HEMOCCULT STL QL: NORMAL
HGB BLD-MCNC: 14 G/DL (ref 12–16)
HGB UR QL STRIP.AUTO: ABNORMAL
HYALINE CASTS #/AREA URNS AUTO: 2 /LPF (ref 0–8)
INR PPP: 1.44 (ref 0.84–1.16)
KETONES UR STRIP.AUTO-MCNC: NEGATIVE MG/DL
LACTATE BLDV-SCNC: 1.1 MMOL/L (ref 0.4–1.9)
LEUKOCYTE ESTERASE UR QL STRIP.AUTO: ABNORMAL
LYMPHOCYTES # BLD: 0.2 K/UL (ref 1–5.1)
LYMPHOCYTES NFR BLD: 1.4 %
MCH RBC QN AUTO: 27.2 PG (ref 26–34)
MCHC RBC AUTO-ENTMCNC: 32 G/DL (ref 31–36)
MCV RBC AUTO: 85 FL (ref 80–100)
MONOCYTES # BLD: 0.7 K/UL (ref 0–1.3)
MONOCYTES NFR BLD: 4.7 %
NEUTROPHILS # BLD: 13.7 K/UL (ref 1.7–7.7)
NEUTROPHILS NFR BLD: 93.6 %
NITRITE UR QL STRIP.AUTO: NEGATIVE
NT-PROBNP SERPL-MCNC: 347 PG/ML (ref 0–449)
PH UR STRIP.AUTO: 6.5 [PH] (ref 5–8)
PLATELET # BLD AUTO: 284 K/UL (ref 135–450)
PMV BLD AUTO: 8.5 FL (ref 5–10.5)
POTASSIUM SERPL-SCNC: 4 MMOL/L (ref 3.5–5.1)
PROT SERPL-MCNC: 6.6 G/DL (ref 6.4–8.2)
PROT UR STRIP.AUTO-MCNC: 30 MG/DL
PROTHROMBIN TIME: 17.5 SEC (ref 11.5–14.8)
RBC # BLD AUTO: 5.16 M/UL (ref 4–5.2)
RBC CLUMPS #/AREA URNS AUTO: 18 /HPF (ref 0–4)
SARS-COV-2 RDRP RESP QL NAA+PROBE: NOT DETECTED
SODIUM SERPL-SCNC: 144 MMOL/L (ref 136–145)
SP GR UR STRIP.AUTO: 1.01 (ref 1–1.03)
TROPONIN T SERPL-MCNC: <0.01 NG/ML
UA COMPLETE W REFLEX CULTURE PNL UR: YES
UA DIPSTICK W REFLEX MICRO PNL UR: YES
URN SPEC COLLECT METH UR: ABNORMAL
UROBILINOGEN UR STRIP-ACNC: 1 E.U./DL
WBC # BLD AUTO: 14.7 K/UL (ref 4–11)
WBC #/AREA URNS AUTO: 160 /HPF (ref 0–5)

## 2023-04-13 PROCEDURE — 84484 ASSAY OF TROPONIN QUANT: CPT

## 2023-04-13 PROCEDURE — 7100000001 HC PACU RECOVERY - ADDTL 15 MIN: Performed by: UROLOGY

## 2023-04-13 PROCEDURE — 2709999900 HC NON-CHARGEABLE SUPPLY: Performed by: UROLOGY

## 2023-04-13 PROCEDURE — 83605 ASSAY OF LACTIC ACID: CPT

## 2023-04-13 PROCEDURE — 3600000014 HC SURGERY LEVEL 4 ADDTL 15MIN: Performed by: UROLOGY

## 2023-04-13 PROCEDURE — 74420 UROGRAPHY RTRGR +-KUB: CPT

## 2023-04-13 PROCEDURE — C1758 CATHETER, URETERAL: HCPCS | Performed by: UROLOGY

## 2023-04-13 PROCEDURE — 3700000000 HC ANESTHESIA ATTENDED CARE: Performed by: UROLOGY

## 2023-04-13 PROCEDURE — 3700000001 HC ADD 15 MINUTES (ANESTHESIA): Performed by: UROLOGY

## 2023-04-13 PROCEDURE — 99285 EMERGENCY DEPT VISIT HI MDM: CPT

## 2023-04-13 PROCEDURE — 6360000004 HC RX CONTRAST MEDICATION: Performed by: UROLOGY

## 2023-04-13 PROCEDURE — BT1D1ZZ FLUOROSCOPY OF RIGHT KIDNEY, URETER AND BLADDER USING LOW OSMOLAR CONTRAST: ICD-10-PCS | Performed by: UROLOGY

## 2023-04-13 PROCEDURE — 6360000002 HC RX W HCPCS: Performed by: EMERGENCY MEDICINE

## 2023-04-13 PROCEDURE — 87040 BLOOD CULTURE FOR BACTERIA: CPT

## 2023-04-13 PROCEDURE — 2580000003 HC RX 258: Performed by: INTERNAL MEDICINE

## 2023-04-13 PROCEDURE — 1200000000 HC SEMI PRIVATE

## 2023-04-13 PROCEDURE — 7100000000 HC PACU RECOVERY - FIRST 15 MIN: Performed by: UROLOGY

## 2023-04-13 PROCEDURE — 96365 THER/PROPH/DIAG IV INF INIT: CPT

## 2023-04-13 PROCEDURE — 82270 OCCULT BLOOD FECES: CPT

## 2023-04-13 PROCEDURE — 6360000004 HC RX CONTRAST MEDICATION: Performed by: EMERGENCY MEDICINE

## 2023-04-13 PROCEDURE — 96361 HYDRATE IV INFUSION ADD-ON: CPT

## 2023-04-13 PROCEDURE — 83880 ASSAY OF NATRIURETIC PEPTIDE: CPT

## 2023-04-13 PROCEDURE — 3600000004 HC SURGERY LEVEL 4 BASE: Performed by: UROLOGY

## 2023-04-13 PROCEDURE — 87635 SARS-COV-2 COVID-19 AMP PRB: CPT

## 2023-04-13 PROCEDURE — 2580000003 HC RX 258: Performed by: EMERGENCY MEDICINE

## 2023-04-13 PROCEDURE — 71045 X-RAY EXAM CHEST 1 VIEW: CPT

## 2023-04-13 PROCEDURE — 87077 CULTURE AEROBIC IDENTIFY: CPT

## 2023-04-13 PROCEDURE — C2617 STENT, NON-COR, TEM W/O DEL: HCPCS | Performed by: UROLOGY

## 2023-04-13 PROCEDURE — 87186 SC STD MICRODIL/AGAR DIL: CPT

## 2023-04-13 PROCEDURE — 0T768DZ DILATION OF RIGHT URETER WITH INTRALUMINAL DEVICE, VIA NATURAL OR ARTIFICIAL OPENING ENDOSCOPIC: ICD-10-PCS | Performed by: UROLOGY

## 2023-04-13 PROCEDURE — C1769 GUIDE WIRE: HCPCS | Performed by: UROLOGY

## 2023-04-13 PROCEDURE — 85025 COMPLETE CBC W/AUTO DIFF WBC: CPT

## 2023-04-13 PROCEDURE — 93005 ELECTROCARDIOGRAM TRACING: CPT | Performed by: EMERGENCY MEDICINE

## 2023-04-13 PROCEDURE — 87086 URINE CULTURE/COLONY COUNT: CPT

## 2023-04-13 PROCEDURE — 80053 COMPREHEN METABOLIC PANEL: CPT

## 2023-04-13 PROCEDURE — 74177 CT ABD & PELVIS W/CONTRAST: CPT

## 2023-04-13 PROCEDURE — 85610 PROTHROMBIN TIME: CPT

## 2023-04-13 PROCEDURE — 2580000003 HC RX 258: Performed by: UROLOGY

## 2023-04-13 PROCEDURE — 81001 URINALYSIS AUTO W/SCOPE: CPT

## 2023-04-13 DEVICE — STENT URET 6FR L26CM PERCFLX HYDR+ TAPR TIP GRAD: Type: IMPLANTABLE DEVICE | Site: URETER | Status: FUNCTIONAL

## 2023-04-13 RX ORDER — FENTANYL CITRATE 50 UG/ML
25 INJECTION, SOLUTION INTRAMUSCULAR; INTRAVENOUS EVERY 5 MIN PRN
Status: DISCONTINUED | OUTPATIENT
Start: 2023-04-13 | End: 2023-04-13 | Stop reason: HOSPADM

## 2023-04-13 RX ORDER — SODIUM CHLORIDE 9 MG/ML
INJECTION, SOLUTION INTRAVENOUS PRN
Status: DISCONTINUED | OUTPATIENT
Start: 2023-04-13 | End: 2023-04-13 | Stop reason: HOSPADM

## 2023-04-13 RX ORDER — PROCHLORPERAZINE EDISYLATE 5 MG/ML
5 INJECTION INTRAMUSCULAR; INTRAVENOUS
Status: DISCONTINUED | OUTPATIENT
Start: 2023-04-13 | End: 2023-04-13 | Stop reason: HOSPADM

## 2023-04-13 RX ORDER — M-VIT,TX,IRON,MINS/CALC/FOLIC 27MG-0.4MG
1 TABLET ORAL DAILY
Status: DISCONTINUED | OUTPATIENT
Start: 2023-04-14 | End: 2023-04-17 | Stop reason: HOSPADM

## 2023-04-13 RX ORDER — SODIUM CHLORIDE 0.9 % (FLUSH) 0.9 %
5-40 SYRINGE (ML) INJECTION PRN
Status: DISCONTINUED | OUTPATIENT
Start: 2023-04-13 | End: 2023-04-13 | Stop reason: HOSPADM

## 2023-04-13 RX ORDER — HYDRALAZINE HYDROCHLORIDE 20 MG/ML
10 INJECTION INTRAMUSCULAR; INTRAVENOUS
Status: DISCONTINUED | OUTPATIENT
Start: 2023-04-13 | End: 2023-04-13 | Stop reason: HOSPADM

## 2023-04-13 RX ORDER — ASCORBIC ACID 500 MG
500 TABLET ORAL DAILY
Status: DISCONTINUED | OUTPATIENT
Start: 2023-04-14 | End: 2023-04-13 | Stop reason: ALTCHOICE

## 2023-04-13 RX ORDER — TRAMADOL HYDROCHLORIDE 50 MG/1
50 TABLET ORAL EVERY 6 HOURS PRN
Status: DISCONTINUED | OUTPATIENT
Start: 2023-04-13 | End: 2023-04-17 | Stop reason: HOSPADM

## 2023-04-13 RX ORDER — LISINOPRIL 5 MG/1
2.5 TABLET ORAL DAILY
Status: DISCONTINUED | OUTPATIENT
Start: 2023-04-14 | End: 2023-04-17 | Stop reason: HOSPADM

## 2023-04-13 RX ORDER — MAGNESIUM HYDROXIDE 1200 MG/15ML
LIQUID ORAL
Status: COMPLETED | OUTPATIENT
Start: 2023-04-13 | End: 2023-04-13

## 2023-04-13 RX ORDER — SODIUM CHLORIDE 0.9 % (FLUSH) 0.9 %
5-40 SYRINGE (ML) INJECTION EVERY 12 HOURS SCHEDULED
Status: DISCONTINUED | OUTPATIENT
Start: 2023-04-13 | End: 2023-04-13 | Stop reason: HOSPADM

## 2023-04-13 RX ORDER — SODIUM CHLORIDE 9 MG/ML
INJECTION, SOLUTION INTRAVENOUS CONTINUOUS
Status: DISCONTINUED | OUTPATIENT
Start: 2023-04-13 | End: 2023-04-16

## 2023-04-13 RX ORDER — LABETALOL HYDROCHLORIDE 5 MG/ML
10 INJECTION, SOLUTION INTRAVENOUS
Status: DISCONTINUED | OUTPATIENT
Start: 2023-04-13 | End: 2023-04-13 | Stop reason: HOSPADM

## 2023-04-13 RX ORDER — SODIUM CHLORIDE 9 MG/ML
25 INJECTION, SOLUTION INTRAVENOUS PRN
Status: DISCONTINUED | OUTPATIENT
Start: 2023-04-13 | End: 2023-04-13 | Stop reason: HOSPADM

## 2023-04-13 RX ORDER — WITCH HAZEL 50 %
1 PADS, MEDICATED (EA) TOPICAL DAILY
Status: DISCONTINUED | OUTPATIENT
Start: 2023-04-14 | End: 2023-04-13 | Stop reason: RX

## 2023-04-13 RX ORDER — WARFARIN SODIUM 5 MG/1
5 TABLET ORAL DAILY
Status: DISCONTINUED | OUTPATIENT
Start: 2023-04-14 | End: 2023-04-14

## 2023-04-13 RX ORDER — TAMSULOSIN HYDROCHLORIDE 0.4 MG/1
0.4 CAPSULE ORAL DAILY
Status: DISCONTINUED | OUTPATIENT
Start: 2023-04-14 | End: 2023-04-17 | Stop reason: HOSPADM

## 2023-04-13 RX ORDER — 0.9 % SODIUM CHLORIDE 0.9 %
1500 INTRAVENOUS SOLUTION INTRAVENOUS ONCE
Status: COMPLETED | OUTPATIENT
Start: 2023-04-13 | End: 2023-04-13

## 2023-04-13 RX ORDER — LIDOCAINE HYDROCHLORIDE 10 MG/ML
1 INJECTION, SOLUTION EPIDURAL; INFILTRATION; INTRACAUDAL; PERINEURAL
Status: DISCONTINUED | OUTPATIENT
Start: 2023-04-13 | End: 2023-04-13 | Stop reason: HOSPADM

## 2023-04-13 RX ORDER — FERROUS SULFATE 325(65) MG
325 TABLET ORAL 2 TIMES DAILY WITH MEALS
Status: DISCONTINUED | OUTPATIENT
Start: 2023-04-14 | End: 2023-04-17 | Stop reason: HOSPADM

## 2023-04-13 RX ORDER — WARFARIN SODIUM 7.5 MG/1
7.5 TABLET ORAL
Status: DISCONTINUED | OUTPATIENT
Start: 2023-04-13 | End: 2023-04-14

## 2023-04-13 RX ORDER — 0.9 % SODIUM CHLORIDE 0.9 %
500 INTRAVENOUS SOLUTION INTRAVENOUS ONCE
Status: COMPLETED | OUTPATIENT
Start: 2023-04-13 | End: 2023-04-13

## 2023-04-13 RX ORDER — DOCUSATE SODIUM 100 MG/1
100 CAPSULE, LIQUID FILLED ORAL DAILY PRN
Status: DISCONTINUED | OUTPATIENT
Start: 2023-04-13 | End: 2023-04-17 | Stop reason: HOSPADM

## 2023-04-13 RX ORDER — HYDROMORPHONE HCL 110MG/55ML
0.25 PATIENT CONTROLLED ANALGESIA SYRINGE INTRAVENOUS EVERY 5 MIN PRN
Status: DISCONTINUED | OUTPATIENT
Start: 2023-04-13 | End: 2023-04-13 | Stop reason: HOSPADM

## 2023-04-13 RX ORDER — PANTOPRAZOLE SODIUM 40 MG/1
40 TABLET, DELAYED RELEASE ORAL
Status: DISCONTINUED | OUTPATIENT
Start: 2023-04-14 | End: 2023-04-17 | Stop reason: HOSPADM

## 2023-04-13 RX ORDER — ONDANSETRON 2 MG/ML
4 INJECTION INTRAMUSCULAR; INTRAVENOUS
Status: DISCONTINUED | OUTPATIENT
Start: 2023-04-13 | End: 2023-04-13 | Stop reason: HOSPADM

## 2023-04-13 RX ORDER — FUROSEMIDE 20 MG/1
20 TABLET ORAL PRN
Status: DISCONTINUED | OUTPATIENT
Start: 2023-04-13 | End: 2023-04-17 | Stop reason: HOSPADM

## 2023-04-13 RX ORDER — HYDROMORPHONE HYDROCHLORIDE 1 MG/ML
0.5 INJECTION, SOLUTION INTRAMUSCULAR; INTRAVENOUS; SUBCUTANEOUS EVERY 4 HOURS PRN
Status: DISCONTINUED | OUTPATIENT
Start: 2023-04-13 | End: 2023-04-16

## 2023-04-13 RX ORDER — WARFARIN SODIUM 5 MG/1
5 TABLET ORAL DAILY
Status: DISCONTINUED | OUTPATIENT
Start: 2023-04-13 | End: 2023-04-13

## 2023-04-13 RX ORDER — ONDANSETRON 2 MG/ML
4 INJECTION INTRAMUSCULAR; INTRAVENOUS EVERY 6 HOURS PRN
Status: DISCONTINUED | OUTPATIENT
Start: 2023-04-13 | End: 2023-04-16

## 2023-04-13 RX ORDER — LEVOTHYROXINE SODIUM 0.07 MG/1
75 TABLET ORAL
Status: DISCONTINUED | OUTPATIENT
Start: 2023-04-14 | End: 2023-04-17 | Stop reason: HOSPADM

## 2023-04-13 RX ADMIN — IOPAMIDOL 75 ML: 755 INJECTION, SOLUTION INTRAVENOUS at 17:03

## 2023-04-13 RX ADMIN — SODIUM CHLORIDE 500 ML: 9 INJECTION, SOLUTION INTRAVENOUS at 16:32

## 2023-04-13 RX ADMIN — CEFTRIAXONE SODIUM 1000 MG: 1 INJECTION, POWDER, FOR SOLUTION INTRAMUSCULAR; INTRAVENOUS at 17:47

## 2023-04-13 RX ADMIN — SODIUM CHLORIDE: 9 INJECTION, SOLUTION INTRAVENOUS at 21:10

## 2023-04-13 RX ADMIN — SODIUM CHLORIDE 1500 ML: 9 INJECTION, SOLUTION INTRAVENOUS at 17:46

## 2023-04-13 ASSESSMENT — PAIN DESCRIPTION - PAIN TYPE: TYPE: SURGICAL PAIN

## 2023-04-13 ASSESSMENT — PAIN SCALES - GENERAL: PAINLEVEL_OUTOF10: 0

## 2023-04-14 LAB
ANION GAP SERPL CALCULATED.3IONS-SCNC: 8 MMOL/L (ref 3–16)
BUN SERPL-MCNC: 13 MG/DL (ref 7–20)
CALCIUM SERPL-MCNC: 8.4 MG/DL (ref 8.3–10.6)
CHLORIDE SERPL-SCNC: 111 MMOL/L (ref 99–110)
CO2 SERPL-SCNC: 25 MMOL/L (ref 21–32)
CREAT SERPL-MCNC: 0.7 MG/DL (ref 0.6–1.2)
DEPRECATED RDW RBC AUTO: 16.3 % (ref 12.4–15.4)
EKG ATRIAL RATE: 114 BPM
EKG DIAGNOSIS: NORMAL
EKG P AXIS: 31 DEGREES
EKG P-R INTERVAL: 188 MS
EKG Q-T INTERVAL: 308 MS
EKG QRS DURATION: 66 MS
EKG QTC CALCULATION (BAZETT): 424 MS
EKG R AXIS: -9 DEGREES
EKG T AXIS: -10 DEGREES
EKG VENTRICULAR RATE: 114 BPM
GFR SERPLBLD CREATININE-BSD FMLA CKD-EPI: >60 ML/MIN/{1.73_M2}
GLUCOSE SERPL-MCNC: 95 MG/DL (ref 70–99)
HCT VFR BLD AUTO: 40.6 % (ref 36–48)
HGB BLD-MCNC: 12.8 G/DL (ref 12–16)
INR PPP: 1.47 (ref 0.84–1.16)
MCH RBC QN AUTO: 27.2 PG (ref 26–34)
MCHC RBC AUTO-ENTMCNC: 31.6 G/DL (ref 31–36)
MCV RBC AUTO: 86 FL (ref 80–100)
PLATELET # BLD AUTO: 245 K/UL (ref 135–450)
PMV BLD AUTO: 8.5 FL (ref 5–10.5)
POTASSIUM SERPL-SCNC: 4 MMOL/L (ref 3.5–5.1)
PROTHROMBIN TIME: 17.8 SEC (ref 11.5–14.8)
RBC # BLD AUTO: 4.72 M/UL (ref 4–5.2)
SODIUM SERPL-SCNC: 144 MMOL/L (ref 136–145)
WBC # BLD AUTO: 12.1 K/UL (ref 4–11)

## 2023-04-14 PROCEDURE — 85610 PROTHROMBIN TIME: CPT

## 2023-04-14 PROCEDURE — 1200000000 HC SEMI PRIVATE

## 2023-04-14 PROCEDURE — 80048 BASIC METABOLIC PNL TOTAL CA: CPT

## 2023-04-14 PROCEDURE — 85027 COMPLETE CBC AUTOMATED: CPT

## 2023-04-14 PROCEDURE — 6370000000 HC RX 637 (ALT 250 FOR IP): Performed by: INTERNAL MEDICINE

## 2023-04-14 PROCEDURE — 36415 COLL VENOUS BLD VENIPUNCTURE: CPT

## 2023-04-14 PROCEDURE — 93010 ELECTROCARDIOGRAM REPORT: CPT | Performed by: INTERNAL MEDICINE

## 2023-04-14 PROCEDURE — 2580000003 HC RX 258: Performed by: INTERNAL MEDICINE

## 2023-04-14 PROCEDURE — 6360000002 HC RX W HCPCS: Performed by: INTERNAL MEDICINE

## 2023-04-14 RX ORDER — WARFARIN SODIUM 7.5 MG/1
7.5 TABLET ORAL
Status: COMPLETED | OUTPATIENT
Start: 2023-04-14 | End: 2023-04-14

## 2023-04-14 RX ORDER — WARFARIN SODIUM 5 MG/1
5 TABLET ORAL DAILY
Status: DISCONTINUED | OUTPATIENT
Start: 2023-04-15 | End: 2023-04-16

## 2023-04-14 RX ADMIN — PANTOPRAZOLE SODIUM 40 MG: 40 TABLET, DELAYED RELEASE ORAL at 06:18

## 2023-04-14 RX ADMIN — LEVOTHYROXINE SODIUM 75 MCG: 0.07 TABLET ORAL at 06:18

## 2023-04-14 RX ADMIN — MULTIPLE VITAMINS W/ MINERALS TAB 1 TABLET: TAB at 10:29

## 2023-04-14 RX ADMIN — LISINOPRIL 2.5 MG: 5 TABLET ORAL at 08:28

## 2023-04-14 RX ADMIN — FERROUS SULFATE TAB 325 MG (65 MG ELEMENTAL FE) 325 MG: 325 (65 FE) TAB at 08:28

## 2023-04-14 RX ADMIN — SODIUM CHLORIDE: 9 INJECTION, SOLUTION INTRAVENOUS at 06:54

## 2023-04-14 RX ADMIN — WARFARIN SODIUM 7.5 MG: 7.5 TABLET ORAL at 17:10

## 2023-04-14 RX ADMIN — SODIUM CHLORIDE: 9 INJECTION, SOLUTION INTRAVENOUS at 17:18

## 2023-04-14 RX ADMIN — TAMSULOSIN HYDROCHLORIDE 0.4 MG: 0.4 CAPSULE ORAL at 08:28

## 2023-04-14 RX ADMIN — CEFTRIAXONE SODIUM 1000 MG: 1 INJECTION, POWDER, FOR SOLUTION INTRAMUSCULAR; INTRAVENOUS at 17:17

## 2023-04-14 RX ADMIN — FERROUS SULFATE TAB 325 MG (65 MG ELEMENTAL FE) 325 MG: 325 (65 FE) TAB at 17:10

## 2023-04-15 LAB
BACTERIA UR CULT: ABNORMAL
BACTERIA UR CULT: ABNORMAL
INR PPP: 1.43 (ref 0.84–1.16)
ORGANISM: ABNORMAL
ORGANISM: ABNORMAL
PROTHROMBIN TIME: 17.4 SEC (ref 11.5–14.8)

## 2023-04-15 PROCEDURE — 6370000000 HC RX 637 (ALT 250 FOR IP): Performed by: INTERNAL MEDICINE

## 2023-04-15 PROCEDURE — 2580000003 HC RX 258: Performed by: INTERNAL MEDICINE

## 2023-04-15 PROCEDURE — 6360000002 HC RX W HCPCS: Performed by: INTERNAL MEDICINE

## 2023-04-15 PROCEDURE — 1200000000 HC SEMI PRIVATE

## 2023-04-15 PROCEDURE — 85610 PROTHROMBIN TIME: CPT

## 2023-04-15 RX ADMIN — LEVOTHYROXINE SODIUM 75 MCG: 0.07 TABLET ORAL at 05:18

## 2023-04-15 RX ADMIN — CEFTRIAXONE SODIUM 1000 MG: 1 INJECTION, POWDER, FOR SOLUTION INTRAMUSCULAR; INTRAVENOUS at 18:28

## 2023-04-15 RX ADMIN — SODIUM CHLORIDE: 9 INJECTION, SOLUTION INTRAVENOUS at 21:30

## 2023-04-15 RX ADMIN — LISINOPRIL 2.5 MG: 5 TABLET ORAL at 09:48

## 2023-04-15 RX ADMIN — TAMSULOSIN HYDROCHLORIDE 0.4 MG: 0.4 CAPSULE ORAL at 09:47

## 2023-04-15 RX ADMIN — FERROUS SULFATE TAB 325 MG (65 MG ELEMENTAL FE) 325 MG: 325 (65 FE) TAB at 16:28

## 2023-04-15 RX ADMIN — PANTOPRAZOLE SODIUM 40 MG: 40 TABLET, DELAYED RELEASE ORAL at 05:18

## 2023-04-15 RX ADMIN — WARFARIN SODIUM 5 MG: 5 TABLET ORAL at 18:29

## 2023-04-15 RX ADMIN — FERROUS SULFATE TAB 325 MG (65 MG ELEMENTAL FE) 325 MG: 325 (65 FE) TAB at 09:47

## 2023-04-15 RX ADMIN — SODIUM CHLORIDE: 9 INJECTION, SOLUTION INTRAVENOUS at 09:42

## 2023-04-15 RX ADMIN — MULTIPLE VITAMINS W/ MINERALS TAB 1 TABLET: TAB at 10:38

## 2023-04-16 ENCOUNTER — APPOINTMENT (OUTPATIENT)
Dept: GENERAL RADIOLOGY | Age: 88
DRG: 854 | End: 2023-04-16
Payer: MEDICARE

## 2023-04-16 LAB
ANION GAP SERPL CALCULATED.3IONS-SCNC: 9 MMOL/L (ref 3–16)
BUN SERPL-MCNC: 9 MG/DL (ref 7–20)
CALCIUM SERPL-MCNC: 8.2 MG/DL (ref 8.3–10.6)
CHLORIDE SERPL-SCNC: 111 MMOL/L (ref 99–110)
CO2 SERPL-SCNC: 25 MMOL/L (ref 21–32)
CREAT SERPL-MCNC: 0.7 MG/DL (ref 0.6–1.2)
DEPRECATED RDW RBC AUTO: 16.2 % (ref 12.4–15.4)
GFR SERPLBLD CREATININE-BSD FMLA CKD-EPI: >60 ML/MIN/{1.73_M2}
GLUCOSE SERPL-MCNC: 137 MG/DL (ref 70–99)
HCT VFR BLD AUTO: 40.2 % (ref 36–48)
HGB BLD-MCNC: 12.7 G/DL (ref 12–16)
INR PPP: 1.34 (ref 0.84–1.16)
MCH RBC QN AUTO: 26.9 PG (ref 26–34)
MCHC RBC AUTO-ENTMCNC: 31.5 G/DL (ref 31–36)
MCV RBC AUTO: 85.3 FL (ref 80–100)
PLATELET # BLD AUTO: 243 K/UL (ref 135–450)
PLATELET BLD QL SMEAR: ADEQUATE
PMV BLD AUTO: 8.3 FL (ref 5–10.5)
POTASSIUM SERPL-SCNC: 3.5 MMOL/L (ref 3.5–5.1)
PROTHROMBIN TIME: 16.6 SEC (ref 11.5–14.8)
RBC # BLD AUTO: 4.72 M/UL (ref 4–5.2)
SLIDE REVIEW: ABNORMAL
SODIUM SERPL-SCNC: 145 MMOL/L (ref 136–145)
WBC # BLD AUTO: 5.9 K/UL (ref 4–11)

## 2023-04-16 PROCEDURE — 85027 COMPLETE CBC AUTOMATED: CPT

## 2023-04-16 PROCEDURE — 6370000000 HC RX 637 (ALT 250 FOR IP): Performed by: INTERNAL MEDICINE

## 2023-04-16 PROCEDURE — 1200000000 HC SEMI PRIVATE

## 2023-04-16 PROCEDURE — 85610 PROTHROMBIN TIME: CPT

## 2023-04-16 PROCEDURE — 71046 X-RAY EXAM CHEST 2 VIEWS: CPT

## 2023-04-16 PROCEDURE — 80048 BASIC METABOLIC PNL TOTAL CA: CPT

## 2023-04-16 PROCEDURE — 36415 COLL VENOUS BLD VENIPUNCTURE: CPT

## 2023-04-16 RX ORDER — ONDANSETRON 4 MG/1
4 TABLET, ORALLY DISINTEGRATING ORAL EVERY 8 HOURS PRN
Status: DISCONTINUED | OUTPATIENT
Start: 2023-04-16 | End: 2023-04-17 | Stop reason: HOSPADM

## 2023-04-16 RX ORDER — HYDROCODONE BITARTRATE AND ACETAMINOPHEN 5; 325 MG/1; MG/1
1 TABLET ORAL EVERY 6 HOURS PRN
Status: DISCONTINUED | OUTPATIENT
Start: 2023-04-16 | End: 2023-04-17 | Stop reason: HOSPADM

## 2023-04-16 RX ORDER — CEFUROXIME AXETIL 250 MG/1
250 TABLET ORAL EVERY 12 HOURS SCHEDULED
Status: DISCONTINUED | OUTPATIENT
Start: 2023-04-16 | End: 2023-04-17 | Stop reason: HOSPADM

## 2023-04-16 RX ORDER — CIPROFLOXACIN 500 MG/1
500 TABLET, FILM COATED ORAL EVERY 12 HOURS SCHEDULED
Status: DISCONTINUED | OUTPATIENT
Start: 2023-04-16 | End: 2023-04-16

## 2023-04-16 RX ORDER — WARFARIN SODIUM 5 MG/1
10 TABLET ORAL
Status: COMPLETED | OUTPATIENT
Start: 2023-04-16 | End: 2023-04-16

## 2023-04-16 RX ADMIN — FERROUS SULFATE TAB 325 MG (65 MG ELEMENTAL FE) 325 MG: 325 (65 FE) TAB at 09:06

## 2023-04-16 RX ADMIN — MULTIPLE VITAMINS W/ MINERALS TAB 1 TABLET: TAB at 11:13

## 2023-04-16 RX ADMIN — LEVOTHYROXINE SODIUM 75 MCG: 0.07 TABLET ORAL at 07:00

## 2023-04-16 RX ADMIN — PANTOPRAZOLE SODIUM 40 MG: 40 TABLET, DELAYED RELEASE ORAL at 07:00

## 2023-04-16 RX ADMIN — WARFARIN SODIUM 10 MG: 5 TABLET ORAL at 17:02

## 2023-04-16 RX ADMIN — FERROUS SULFATE TAB 325 MG (65 MG ELEMENTAL FE) 325 MG: 325 (65 FE) TAB at 16:51

## 2023-04-16 RX ADMIN — LISINOPRIL 2.5 MG: 5 TABLET ORAL at 09:06

## 2023-04-16 RX ADMIN — TAMSULOSIN HYDROCHLORIDE 0.4 MG: 0.4 CAPSULE ORAL at 09:05

## 2023-04-17 VITALS
BODY MASS INDEX: 29.29 KG/M2 | DIASTOLIC BLOOD PRESSURE: 76 MMHG | RESPIRATION RATE: 17 BRPM | SYSTOLIC BLOOD PRESSURE: 129 MMHG | TEMPERATURE: 98.2 F | OXYGEN SATURATION: 93 % | WEIGHT: 150 LBS | HEART RATE: 87 BPM

## 2023-04-17 PROBLEM — J18.9 PNEUMONIA: Status: ACTIVE | Noted: 2023-04-17

## 2023-04-17 LAB
BACTERIA BLD CULT ORG #2: NORMAL
BACTERIA BLD CULT: NORMAL
INR PPP: 1.62 (ref 0.84–1.16)
PROTHROMBIN TIME: 19.2 SEC (ref 11.5–14.8)

## 2023-04-17 PROCEDURE — 97162 PT EVAL MOD COMPLEX 30 MIN: CPT

## 2023-04-17 PROCEDURE — 97535 SELF CARE MNGMENT TRAINING: CPT

## 2023-04-17 PROCEDURE — 97530 THERAPEUTIC ACTIVITIES: CPT

## 2023-04-17 PROCEDURE — 6370000000 HC RX 637 (ALT 250 FOR IP): Performed by: INTERNAL MEDICINE

## 2023-04-17 PROCEDURE — 85610 PROTHROMBIN TIME: CPT

## 2023-04-17 PROCEDURE — 97166 OT EVAL MOD COMPLEX 45 MIN: CPT

## 2023-04-17 RX ORDER — TAMSULOSIN HYDROCHLORIDE 0.4 MG/1
0.4 CAPSULE ORAL DAILY
Qty: 30 CAPSULE | Refills: 0 | Status: SHIPPED | OUTPATIENT
Start: 2023-04-18

## 2023-04-17 RX ORDER — WARFARIN SODIUM 5 MG/1
5 TABLET ORAL DAILY
Status: DISCONTINUED | OUTPATIENT
Start: 2023-04-17 | End: 2023-04-17 | Stop reason: HOSPADM

## 2023-04-17 RX ORDER — CEFUROXIME AXETIL 250 MG/1
250 TABLET ORAL EVERY 12 HOURS SCHEDULED
Qty: 14 TABLET | Refills: 0 | Status: SHIPPED | OUTPATIENT
Start: 2023-04-17 | End: 2023-04-24

## 2023-04-17 RX ORDER — ONDANSETRON 4 MG/1
4 TABLET, ORALLY DISINTEGRATING ORAL EVERY 8 HOURS PRN
Qty: 20 TABLET | Refills: 0 | Status: SHIPPED | OUTPATIENT
Start: 2023-04-17

## 2023-04-17 RX ADMIN — LEVOTHYROXINE SODIUM 75 MCG: 0.07 TABLET ORAL at 05:57

## 2023-04-17 RX ADMIN — FERROUS SULFATE TAB 325 MG (65 MG ELEMENTAL FE) 325 MG: 325 (65 FE) TAB at 09:11

## 2023-04-17 RX ADMIN — PANTOPRAZOLE SODIUM 40 MG: 40 TABLET, DELAYED RELEASE ORAL at 05:57

## 2023-04-17 RX ADMIN — LISINOPRIL 2.5 MG: 5 TABLET ORAL at 09:11

## 2023-04-17 RX ADMIN — CEFUROXIME AXETIL 250 MG: 250 TABLET ORAL at 09:11

## 2023-04-17 RX ADMIN — TAMSULOSIN HYDROCHLORIDE 0.4 MG: 0.4 CAPSULE ORAL at 09:11

## 2023-04-17 NOTE — DISCHARGE INSTR - COC
the above information and transfer of Loreto Riley  is necessary for the continuing treatment of the diagnosis listed and that she requires [place] for less 30 days.      Update Admission H&P: No change in H&P    PHYSICIAN SIGNATURE:  Electronically signed by Latha Rhodes MD on 4/17/23 at 12:58 PM EDT

## 2023-04-17 NOTE — CONSULTS
Pharmacy to Dose Warfarin    Pharmacy consulted to dose warfarin for h/o PE. INR Goal: 2.0-3.0    INR today: 1.62    Assessment/Plan:  - INR subtherapeutic, give 7.5 mg dose of warfarin tonight. - Possible concomitant drug-drug interactions include: tramadol     Pharmacy will continue to follow.     Jerad Biggs  PharmD Candidate [id number]    Hyacinth Mckenzie PharmD, [address]  Clinical Pharmacy Specialist  [id number]

## 2023-04-17 NOTE — CARE COORDINATION
Discharge Plan:     [facility]  [place], [address]    SW/DC [address] [facility] faxed, [address] to: [phone]    Narcotic Prescriptions faxed were: N/A    RN:  will call report to: [phone]      Medical Transport with: [facility] [phone]     time: 1830    Family advised of discharge?: yes, Romana Colas, daughter    SANDRA Submitted?:  N/A    All discharge needs met per case management.     YOLANDE ArguellesN RN    [facility]  Phone: [phone]

## 2023-04-24 NOTE — PROGRESS NOTES
Attempted to call report to [facility] no answer.
CLINICAL PHARMACY NOTE: MEDS TO BEDS    Total # of Prescriptions Filled: 3   The following medications were delivered to the patient:  Tamsulosin 0.4mg   Cefuroxime 250 mg  Ondansetron 4 mg odt    Additional Documentation:  Delivered to patient. Signed for by patient.
Department of Internal Medicine  General Internal Medicine   Progress Note      SUBJECTIVE: continues to feel well subjectively , no pain still needs 2-3 L of O2     History obtained from chart review, the patient, and nursing staff   General ROS: positive for  - fatigue, malaise, and weight loss  negative for - chills, fever, or night sweats  Psychological ROS: negative  Ophthalmic ROS: negative  Respiratory ROS: no cough, shortness of breath, or wheezing  Cardiovascular ROS: no chest pain or dyspnea on exertion  Gastrointestinal ROS: no abdominal pain, change in bowel habits, or black or bloody stools  Genito-Urinary ROS: no dysuria, trouble voiding, or hematuria  Musculoskeletal ROS: negative  Neurological ROS: no TIA or stroke symptoms  Dermatological ROS: negative    OBJECTIVE      Medications      Current Facility-Administered Medications: HYDROcodone-acetaminophen (NORCO) 5-325 MG per tablet 1 tablet, 1 tablet, Oral, Q6H PRN  ondansetron (ZOFRAN-ODT) disintegrating tablet 4 mg, 4 mg, Oral, Q8H PRN  cefUROXime (CEFTIN) tablet 250 mg, 250 mg, Oral, 2 times per day  docusate sodium (COLACE) capsule 100 mg, 100 mg, Oral, Daily PRN  ferrous sulfate (IRON 325) tablet 325 mg, 325 mg, Oral, BID WC  furosemide (LASIX) tablet 20 mg, 20 mg, Oral, PRN  levothyroxine (SYNTHROID) tablet 75 mcg, 75 mcg, Oral, QAM AC  lisinopril (PRINIVIL;ZESTRIL) tablet 2.5 mg, 2.5 mg, Oral, Daily  therapeutic multivitamin-minerals 1 tablet, 1 tablet, Oral, Daily  pantoprazole (PROTONIX) tablet 40 mg, 40 mg, Oral, QAM AC  Riociguat TABS 2.5 mg (Patient Supplied), 2.5 mg, Oral, TID  traMADol (ULTRAM) tablet 50 mg, 50 mg, Oral, Q6H PRN  tamsulosin (FLOMAX) capsule 0.4 mg, 0.4 mg, Oral, Daily    Physical      Vitals: /76   Pulse 87   Temp 98.2 °F (36.8 °C) (Oral)   Resp 17   Wt 150 lb (68 kg)   SpO2 93%   BMI 29.29 kg/m²   Temp: Temp: 98.2 °F (36.8 °C)  Max: Temp  Av.2 °F (36.8 °C)  Min: 97.9 °F (36.6 °C)  Max: 98.3 °F (36.8
Evening medications were given per order. Upon scanning [facility] computer locked up. Now unable to use computer in room and cannot scan from rolling computer d/t medication is locked out from room computer.
Katina Brown updated with pick-up time.
Medical transport here to pick-up patient. Report given. IV removed without complications. All personal belongings gathered. All questions answered.
Pharmacy to Dose Warfarin    Pharmacy consulted to dose warfarin for h/o PE. INR Goal: 2.0-3.0    INR today: 1.62    Assessment/Plan:  - INR subtherapeutic, but trending up appropriately   - resume home regimen of 5 mg daily  - Possible concomitant drug-drug interactions include: tramadol     Pharmacy will continue to follow.     Santy Denise  PharmD Candidate [address] [place] [address], PharmD, [facility]  [id number]  4/17/2023 1:51 PM
Physician Progress Note      PATIENT:               Kimberly Romero  CSN #:                  [id number]  :                       1934  ADMIT DATE:       2023 3:46 PM  [address] [place] DATE:        2023 4:35 PM  RESPONDING  PROVIDER #:        Irene Anthony MD          QUERY TEXT:    Patient admitted with nephrolithiasis. Documentation reflects sepsis in H&P   note dated . If possible, please document in the progress notes and   discharge summary if sepsis was: The medical record reflects the following:  Risk Factors: UTI  Clinical Indicators:  H&P note documented \"sepsis. \"  WBC 14.7, lactic   sepsis 1.1, UTI, , RR 29  Treatment: IV Rocephin, urine culture  Options provided:  -- Sepsis due to UTI confirmed after study  -- Sepsis ruled out after study  -- Other - I will add my own diagnosis  -- Disagree - Not applicable / Not valid  -- Disagree - Clinically unable to determine / Unknown  -- Refer to Clinical Documentation Reviewer    PROVIDER RESPONSE TEXT:    Sepsis due to UTI confirmed after study. Query created by: Leota Epley on 2023 9:52 AM      Electronically signed by:   Irene Anthony MD 2023 1:47 PM
Urology Progress Note  [facility]    Provider: TAVON Morley CNP  Patient ID:  Admission Date: 2023 Name: Desiree Govea Date: 2023 MRN: [id number]   Patient Location: [address] [place], [place] [id number] : 1934  Attending: Luis Eduardo High MD Date of Service: 2023  PCP: TAVON Maya CNP     Diagnoses:  1. Sepsis, due to unspecified organism, unspecified whether acute organ dysfunction present ([address]    2. Ureteric stone          Assessment/Plan:  S/p RIGHT sided stent placement 2023  -There will be a future stone surgery. ESWL vs ureteroscopy. In the coming weeks  -Continue IV abx and follow up cultures   -Potter out. Voiding with good uop  -Obtain PVR  -Can discharge from  standpoint    Discussed the plan of care with patient's son, Esteban White. He had a chance to ask questions which were answered. He understands the above plan. Subjective:   Raquel Hodges is a 80 y.o. female. She was seen and examined this morning. Today we discussed procedure and our plan of care. Tolerating stent well. Objective:   Vitals:  Vitals:    23 0545   BP: 129/75   Pulse: 84   Resp: 16   Temp: 98.3 °F (36.8 °C)   SpO2: 92%       Intake/Output Summary (Last 24 hours) at 2023 0847  Last data filed at 2023 1857  Gross per 24 hour   Intake --   Output 1300 ml   Net -1300 ml       Physical Exam:  Gen: Alert and oriented x3, no acute distress  Skin: warmand well perfused, no rashes noted on the face, or arms.    Potter cyu    Labs:  Lab Results   Component Value Date    WBC 5.9 2023    HGB 12.7 2023    HCT 40.2 2023    MCV 85.3 2023     2023     Lab Results   Component Value Date    CREATININE 0.7 2023    BUN 9 2023     2023    K 3.5 2023     (H) 2023    CO2 25 2023       TAVON Morley - CNP   2023
dependent assistance  Sit to Supine: 2 person assistance with dependent x2   Rolling Left: 2 person assistance with MOD x2   Rolling Right: 2 person assistance with MOD x2   Comments:  Transfers  No transfers completed on this date secondary to patient soiled upon sitting. Returned supine to change. Patient being discharged and leaving today so patient kept in bed for transport. Comments:  Ambulation  Ambulation not tested on this date secondary to patient non-ambulatory at baseline. Distance: N/A  Gait Mechanics: N/A  Comments:    Stair Mobility  Stair mobility not completed on this date. Comments:  Wheelchair Mobility:  No w/c mobility completed on this date. Comments:  Balance  Static Sitting Balance: poor (-): requires max (A) to maintain balance  Comments:    Other Therapeutic Interventions    Functional Outcomes  -PAC Inpatient Mobility Raw Score : 7              Cognition  Overall Cognitive Status: Impaired  Orientation:    oriented to person, disoriented to place, and disoriented to time   Command Following:   accurately follows one step commands    Education  Barriers To Learning: cognition and physical  Patient Education: patient educated on goals, PT role and benefits, plan of care, general safety, functional mobility training, orientation, injury prevention, transfer training, discharge recommendations  Learning Assessment:  patient will require reinforcement due to cognitive deficits    Assessment  Activity Tolerance: Patient physically limited in terms of functional mobility. Impairments Requiring Therapeutic Intervention: decreased functional mobility, decreased ADL status, decreased ROM, decreased strength, decreased cognition, decreased balance, decreased posture  Prognosis: poor  Clinical Assessment: Patient is dependent for transfers at baseline with assist x2 people per [facility].   She presently requires dependent assistance for bed mobility and would have required the same for
Session Time     Individual Group Co-treatment   Time In    1092   Time Out    1329   Minutes    56       Timed Code Treatment Minutes: 41 Minutes  Total Treatment Minutes:  56 minutes       Electronically Signed By: VICKY Schumacher OTR/L  [id number]

## 2023-04-25 ENCOUNTER — HOSPITAL ENCOUNTER (INPATIENT)
Age: 88
LOS: 3 days | Discharge: NURSING, REHAB OR LONG-TERM CARE | End: 2023-04-28
Attending: EMERGENCY MEDICINE | Admitting: INTERNAL MEDICINE
Payer: MEDICARE

## 2023-04-25 ENCOUNTER — APPOINTMENT (OUTPATIENT)
Dept: GENERAL RADIOLOGY | Age: 88
End: 2023-04-25
Payer: MEDICARE

## 2023-04-25 DIAGNOSIS — N39.0 URINARY TRACT INFECTION WITHOUT HEMATURIA, SITE UNSPECIFIED: ICD-10-CM

## 2023-04-25 DIAGNOSIS — R53.1 GENERAL WEAKNESS: Primary | ICD-10-CM

## 2023-04-25 LAB
ALBUMIN SERPL-MCNC: 3.3 G/DL (ref 3.4–5)
ALBUMIN/GLOB SERPL: 1.3 {RATIO} (ref 1.1–2.2)
ALP SERPL-CCNC: 113 U/L (ref 40–129)
ALT SERPL-CCNC: 13 U/L (ref 10–40)
ANION GAP SERPL CALCULATED.3IONS-SCNC: 7 MMOL/L (ref 3–16)
AST SERPL-CCNC: 20 U/L (ref 15–37)
BACTERIA URNS QL MICRO: ABNORMAL /HPF
BASOPHILS # BLD: 0.1 K/UL (ref 0–0.2)
BASOPHILS NFR BLD: 1.1 %
BILIRUB SERPL-MCNC: <0.2 MG/DL (ref 0–1)
BILIRUB UR QL STRIP.AUTO: NEGATIVE
BUN SERPL-MCNC: 8 MG/DL (ref 7–20)
C DIFF TOX A+B STL QL IA: NORMAL
CALCIUM SERPL-MCNC: 8.9 MG/DL (ref 8.3–10.6)
CHARACTER UR: ABNORMAL
CHLORIDE SERPL-SCNC: 106 MMOL/L (ref 99–110)
CLARITY UR: ABNORMAL
CO2 SERPL-SCNC: 30 MMOL/L (ref 21–32)
COLOR UR: ABNORMAL
CREAT SERPL-MCNC: 0.7 MG/DL (ref 0.6–1.2)
DEPRECATED RDW RBC AUTO: 16.3 % (ref 12.4–15.4)
EOSINOPHIL # BLD: 0.3 K/UL (ref 0–0.6)
EOSINOPHIL NFR BLD: 3.7 %
EPI CELLS #/AREA URNS HPF: ABNORMAL /HPF (ref 0–5)
GFR SERPLBLD CREATININE-BSD FMLA CKD-EPI: >60 ML/MIN/{1.73_M2}
GLUCOSE SERPL-MCNC: 115 MG/DL (ref 70–99)
GLUCOSE UR STRIP.AUTO-MCNC: NEGATIVE MG/DL
HCT VFR BLD AUTO: 41.7 % (ref 36–48)
HGB BLD-MCNC: 13.4 G/DL (ref 12–16)
HGB UR QL STRIP.AUTO: ABNORMAL
HYALINE CASTS #/AREA URNS LPF: ABNORMAL /LPF (ref 0–2)
INR PPP: 1.44 (ref 0.84–1.16)
KETONES UR STRIP.AUTO-MCNC: NEGATIVE MG/DL
LEUKOCYTE ESTERASE UR QL STRIP.AUTO: ABNORMAL
LYMPHOCYTES # BLD: 1.8 K/UL (ref 1–5.1)
LYMPHOCYTES NFR BLD: 21.7 %
MCH RBC QN AUTO: 27.2 PG (ref 26–34)
MCHC RBC AUTO-ENTMCNC: 32 G/DL (ref 31–36)
MCV RBC AUTO: 85 FL (ref 80–100)
MONOCYTES # BLD: 0.7 K/UL (ref 0–1.3)
MONOCYTES NFR BLD: 8.1 %
NEUTROPHILS # BLD: 5.5 K/UL (ref 1.7–7.7)
NEUTROPHILS NFR BLD: 65.4 %
NITRITE UR QL STRIP.AUTO: POSITIVE
NT-PROBNP SERPL-MCNC: 340 PG/ML (ref 0–449)
PH UR STRIP.AUTO: 7 [PH] (ref 5–8)
PLATELET # BLD AUTO: 358 K/UL (ref 135–450)
PMV BLD AUTO: 8.4 FL (ref 5–10.5)
POTASSIUM SERPL-SCNC: 4.2 MMOL/L (ref 3.5–5.1)
PROT SERPL-MCNC: 5.9 G/DL (ref 6.4–8.2)
PROT UR STRIP.AUTO-MCNC: 100 MG/DL
PROTHROMBIN TIME: 17.5 SEC (ref 11.5–14.8)
RBC # BLD AUTO: 4.91 M/UL (ref 4–5.2)
RBC #/AREA URNS HPF: ABNORMAL /HPF (ref 0–4)
SODIUM SERPL-SCNC: 143 MMOL/L (ref 136–145)
SP GR UR STRIP.AUTO: 1.02 (ref 1–1.03)
TROPONIN, HIGH SENSITIVITY: 19 NG/L (ref 0–14)
UA COMPLETE W REFLEX CULTURE PNL UR: YES
UA DIPSTICK W REFLEX MICRO PNL UR: YES
URN SPEC COLLECT METH UR: ABNORMAL
UROBILINOGEN UR STRIP-ACNC: 0.2 E.U./DL
WBC # BLD AUTO: 8.4 K/UL (ref 4–11)
WBC #/AREA URNS HPF: >100 /HPF (ref 0–5)

## 2023-04-25 PROCEDURE — 6360000002 HC RX W HCPCS: Performed by: EMERGENCY MEDICINE

## 2023-04-25 PROCEDURE — 71045 X-RAY EXAM CHEST 1 VIEW: CPT

## 2023-04-25 PROCEDURE — 99285 EMERGENCY DEPT VISIT HI MDM: CPT

## 2023-04-25 PROCEDURE — 85610 PROTHROMBIN TIME: CPT

## 2023-04-25 PROCEDURE — 2700000000 HC OXYGEN THERAPY PER DAY

## 2023-04-25 PROCEDURE — 74018 RADEX ABDOMEN 1 VIEW: CPT

## 2023-04-25 PROCEDURE — 36415 COLL VENOUS BLD VENIPUNCTURE: CPT

## 2023-04-25 PROCEDURE — 80053 COMPREHEN METABOLIC PANEL: CPT

## 2023-04-25 PROCEDURE — 1200000000 HC SEMI PRIVATE

## 2023-04-25 PROCEDURE — 87077 CULTURE AEROBIC IDENTIFY: CPT

## 2023-04-25 PROCEDURE — 96365 THER/PROPH/DIAG IV INF INIT: CPT

## 2023-04-25 PROCEDURE — 87324 CLOSTRIDIUM AG IA: CPT

## 2023-04-25 PROCEDURE — 87184 SC STD DISK METHOD PER PLATE: CPT

## 2023-04-25 PROCEDURE — 81001 URINALYSIS AUTO W/SCOPE: CPT

## 2023-04-25 PROCEDURE — 2580000003 HC RX 258: Performed by: EMERGENCY MEDICINE

## 2023-04-25 PROCEDURE — 83880 ASSAY OF NATRIURETIC PEPTIDE: CPT

## 2023-04-25 PROCEDURE — 84484 ASSAY OF TROPONIN QUANT: CPT

## 2023-04-25 PROCEDURE — 87086 URINE CULTURE/COLONY COUNT: CPT

## 2023-04-25 PROCEDURE — 85025 COMPLETE CBC W/AUTO DIFF WBC: CPT

## 2023-04-25 PROCEDURE — 93005 ELECTROCARDIOGRAM TRACING: CPT | Performed by: EMERGENCY MEDICINE

## 2023-04-25 PROCEDURE — 87186 SC STD MICRODIL/AGAR DIL: CPT

## 2023-04-25 PROCEDURE — 87449 NOS EACH ORGANISM AG IA: CPT

## 2023-04-25 RX ORDER — WITCH HAZEL 50 %
1 PADS, MEDICATED (EA) TOPICAL DAILY
Status: DISCONTINUED | OUTPATIENT
Start: 2023-04-26 | End: 2023-04-25

## 2023-04-25 RX ORDER — ONDANSETRON 4 MG/1
4 TABLET, ORALLY DISINTEGRATING ORAL EVERY 8 HOURS PRN
Status: DISCONTINUED | OUTPATIENT
Start: 2023-04-25 | End: 2023-04-25

## 2023-04-25 RX ORDER — DOCUSATE SODIUM 100 MG/1
100 CAPSULE, LIQUID FILLED ORAL DAILY PRN
Status: DISCONTINUED | OUTPATIENT
Start: 2023-04-25 | End: 2023-04-28 | Stop reason: HOSPADM

## 2023-04-25 RX ORDER — WARFARIN SODIUM 5 MG/1
5 TABLET ORAL DAILY
Status: DISCONTINUED | OUTPATIENT
Start: 2023-04-26 | End: 2023-04-28 | Stop reason: HOSPADM

## 2023-04-25 RX ORDER — FERROUS SULFATE 325(65) MG
325 TABLET ORAL 2 TIMES DAILY WITH MEALS
Status: DISCONTINUED | OUTPATIENT
Start: 2023-04-26 | End: 2023-04-28 | Stop reason: HOSPADM

## 2023-04-25 RX ORDER — LISINOPRIL 5 MG/1
2.5 TABLET ORAL DAILY
Status: DISCONTINUED | OUTPATIENT
Start: 2023-04-26 | End: 2023-04-28 | Stop reason: HOSPADM

## 2023-04-25 RX ORDER — LEVOTHYROXINE SODIUM 0.07 MG/1
75 TABLET ORAL DAILY
Status: DISCONTINUED | OUTPATIENT
Start: 2023-04-26 | End: 2023-04-28 | Stop reason: HOSPADM

## 2023-04-25 RX ORDER — TAMSULOSIN HYDROCHLORIDE 0.4 MG/1
0.4 CAPSULE ORAL DAILY
Status: DISCONTINUED | OUTPATIENT
Start: 2023-04-26 | End: 2023-04-28 | Stop reason: HOSPADM

## 2023-04-25 RX ORDER — PANTOPRAZOLE SODIUM 40 MG/1
40 TABLET, DELAYED RELEASE ORAL
Status: DISCONTINUED | OUTPATIENT
Start: 2023-04-26 | End: 2023-04-25

## 2023-04-25 RX ORDER — M-VIT,TX,IRON,MINS/CALC/FOLIC 27MG-0.4MG
1 TABLET ORAL DAILY
Status: DISCONTINUED | OUTPATIENT
Start: 2023-04-26 | End: 2023-04-28 | Stop reason: HOSPADM

## 2023-04-25 RX ORDER — ASCORBIC ACID 500 MG
500 TABLET ORAL DAILY
Status: DISCONTINUED | OUTPATIENT
Start: 2023-04-26 | End: 2023-04-25

## 2023-04-25 RX ORDER — ENOXAPARIN SODIUM 100 MG/ML
30 INJECTION SUBCUTANEOUS DAILY
Status: DISCONTINUED | OUTPATIENT
Start: 2023-04-26 | End: 2023-04-25

## 2023-04-25 RX ADMIN — CEFTRIAXONE SODIUM 1000 MG: 1 INJECTION, POWDER, FOR SOLUTION INTRAMUSCULAR; INTRAVENOUS at 21:08

## 2023-04-25 ASSESSMENT — PAIN SCALES - GENERAL: PAINLEVEL_OUTOF10: 0

## 2023-04-25 NOTE — ED NOTES
Pt had large loose BM. Dr. Lizy Camp aware. C diff collected and sent to lab. Pt resting, monitors in place.       Va Emerson RN  04/25/23 7403

## 2023-04-25 NOTE — ED PROVIDER NOTES
Neutrophils % 65.4 %    Lymphocytes % 21.7 %    Monocytes % 8.1 %    Eosinophils % 3.7 %    Basophils % 1.1 %    Neutrophils Absolute 5.5 1.7 - 7.7 K/uL    Lymphocytes Absolute 1.8 1.0 - 5.1 K/uL    Monocytes Absolute 0.7 0.0 - 1.3 K/uL    Eosinophils Absolute 0.3 0.0 - 0.6 K/uL    Basophils Absolute 0.1 0.0 - 0.2 K/uL   CMP w/ Reflex to MG   Result Value Ref Range    Sodium 143 136 - 145 mmol/L    Potassium reflex Magnesium 4.2 3.5 - 5.1 mmol/L    Chloride 106 99 - 110 mmol/L    CO2 30 21 - 32 mmol/L    Anion Gap 7 3 - 16    Glucose 115 (H) 70 - 99 mg/dL    BUN 8 7 - 20 mg/dL    Creatinine 0.7 0.6 - 1.2 mg/dL    Est, Glom Filt Rate >60 >60    Calcium 8.9 8.3 - 10.6 mg/dL    Total Protein 5.9 (L) 6.4 - 8.2 g/dL    Albumin 3.3 (L) 3.4 - 5.0 g/dL    Albumin/Globulin Ratio 1.3 1.1 - 2.2    Total Bilirubin <0.2 0.0 - 1.0 mg/dL    Alkaline Phosphatase 113 40 - 129 U/L    ALT 13 10 - 40 U/L    AST 20 15 - 37 U/L   Urinalysis with Reflex to Culture    Specimen: Urine   Result Value Ref Range    Color, UA RED (A) Straw/Yellow    Clarity, UA CLOUDY (A) Clear    Glucose, Ur Negative Negative mg/dL    Bilirubin Urine Negative Negative    Ketones, Urine Negative Negative mg/dL    Specific Gravity, UA 1.020 1.005 - 1.030    Blood, Urine LARGE (A) Negative    pH, UA 7.0 5.0 - 8.0    Protein,  (A) Negative mg/dL    Urobilinogen, Urine 0.2 <2.0 E.U./dL    Nitrite, Urine POSITIVE (A) Negative    Leukocyte Esterase, Urine LARGE (A) Negative    Microscopic Examination YES     Urine Type NotGiven     Urine Reflex to Culture Yes    Protime-INR   Result Value Ref Range    Protime 17.5 (H) 11.5 - 14.8 sec    INR 1.44 (H) 0.84 - 1.16   BNP   Result Value Ref Range    Pro- 0 - 449 pg/mL   Troponin   Result Value Ref Range    Troponin, High Sensitivity 19 (H) 0 - 14 ng/L   Microscopic Urinalysis   Result Value Ref Range    Hyaline Casts, UA 3-5 (A) 0 - 2 /LPF    WBC, UA >100 (A) 0 - 5 /HPF    RBC, UA  (A) 0 - 4 /HPF

## 2023-04-26 LAB
ANION GAP SERPL CALCULATED.3IONS-SCNC: 5 MMOL/L (ref 3–16)
BUN SERPL-MCNC: 7 MG/DL (ref 7–20)
C DIFF TOX A+B STL QL IA: NORMAL
CALCIUM SERPL-MCNC: 8.7 MG/DL (ref 8.3–10.6)
CHLORIDE SERPL-SCNC: 108 MMOL/L (ref 99–110)
CO2 SERPL-SCNC: 30 MMOL/L (ref 21–32)
CREAT SERPL-MCNC: <0.5 MG/DL (ref 0.6–1.2)
DEPRECATED RDW RBC AUTO: 16.1 % (ref 12.4–15.4)
EKG ATRIAL RATE: 90 BPM
EKG DIAGNOSIS: NORMAL
EKG P AXIS: 57 DEGREES
EKG P-R INTERVAL: 204 MS
EKG Q-T INTERVAL: 378 MS
EKG QRS DURATION: 74 MS
EKG QTC CALCULATION (BAZETT): 462 MS
EKG R AXIS: 0 DEGREES
EKG T AXIS: 20 DEGREES
EKG VENTRICULAR RATE: 90 BPM
GFR SERPLBLD CREATININE-BSD FMLA CKD-EPI: >60 ML/MIN/{1.73_M2}
GLUCOSE SERPL-MCNC: 99 MG/DL (ref 70–99)
HCT VFR BLD AUTO: 37.5 % (ref 36–48)
HGB BLD-MCNC: 12 G/DL (ref 12–16)
INR PPP: 1.52 (ref 0.84–1.16)
MCH RBC QN AUTO: 27.2 PG (ref 26–34)
MCHC RBC AUTO-ENTMCNC: 32 G/DL (ref 31–36)
MCV RBC AUTO: 85.2 FL (ref 80–100)
PLATELET # BLD AUTO: 336 K/UL (ref 135–450)
PMV BLD AUTO: 7.8 FL (ref 5–10.5)
POTASSIUM SERPL-SCNC: 3.5 MMOL/L (ref 3.5–5.1)
PROTHROMBIN TIME: 18.3 SEC (ref 11.5–14.8)
RBC # BLD AUTO: 4.4 M/UL (ref 4–5.2)
SODIUM SERPL-SCNC: 143 MMOL/L (ref 136–145)
WBC # BLD AUTO: 6.6 K/UL (ref 4–11)

## 2023-04-26 PROCEDURE — 97535 SELF CARE MNGMENT TRAINING: CPT

## 2023-04-26 PROCEDURE — 97162 PT EVAL MOD COMPLEX 30 MIN: CPT

## 2023-04-26 PROCEDURE — 97530 THERAPEUTIC ACTIVITIES: CPT

## 2023-04-26 PROCEDURE — 6370000000 HC RX 637 (ALT 250 FOR IP): Performed by: INTERNAL MEDICINE

## 2023-04-26 PROCEDURE — 85027 COMPLETE CBC AUTOMATED: CPT

## 2023-04-26 PROCEDURE — 85610 PROTHROMBIN TIME: CPT

## 2023-04-26 PROCEDURE — 1200000000 HC SEMI PRIVATE

## 2023-04-26 PROCEDURE — 87324 CLOSTRIDIUM AG IA: CPT

## 2023-04-26 PROCEDURE — 97166 OT EVAL MOD COMPLEX 45 MIN: CPT

## 2023-04-26 PROCEDURE — 87449 NOS EACH ORGANISM AG IA: CPT

## 2023-04-26 PROCEDURE — 80048 BASIC METABOLIC PNL TOTAL CA: CPT

## 2023-04-26 PROCEDURE — 36415 COLL VENOUS BLD VENIPUNCTURE: CPT

## 2023-04-26 PROCEDURE — 93010 ELECTROCARDIOGRAM REPORT: CPT | Performed by: INTERNAL MEDICINE

## 2023-04-26 RX ADMIN — TAMSULOSIN HYDROCHLORIDE 0.4 MG: 0.4 CAPSULE ORAL at 11:23

## 2023-04-26 RX ADMIN — FERROUS SULFATE TAB 325 MG (65 MG ELEMENTAL FE) 325 MG: 325 (65 FE) TAB at 18:08

## 2023-04-26 RX ADMIN — WARFARIN SODIUM 5 MG: 5 TABLET ORAL at 18:08

## 2023-04-26 RX ADMIN — LEVOTHYROXINE SODIUM 75 MCG: 0.07 TABLET ORAL at 11:23

## 2023-04-26 RX ADMIN — MULTIPLE VITAMINS W/ MINERALS TAB 1 TABLET: TAB at 11:25

## 2023-04-26 RX ADMIN — LISINOPRIL 2.5 MG: 5 TABLET ORAL at 11:23

## 2023-04-26 RX ADMIN — FERROUS SULFATE TAB 325 MG (65 MG ELEMENTAL FE) 325 MG: 325 (65 FE) TAB at 11:24

## 2023-04-26 ASSESSMENT — PAIN SCALES - GENERAL
PAINLEVEL_OUTOF10: 0

## 2023-04-26 NOTE — CARE COORDINATION
Discharge Planning  PT & OT evaluations completed ,recommend patient returns to prior setting with prior services. Patient's daughter Nelly Vera was updated.

## 2023-04-26 NOTE — CARE COORDINATION
Case Management Assessment  Initial Evaluation    Date/Time of Evaluation: 4/26/2023 4:37 PM  Assessment Completed by: Lilli Carrera RN    If patient is discharged prior to next notation, then this note serves as note for discharge by case management. Patient Name: Seferino Litten                   YOB: 1934  Diagnosis: UTI (urinary tract infection) [N39.0]  General weakness [R53.1]  Urinary tract infection without hematuria, site unspecified [N39.0]                   Date / Time: 4/25/2023  3:07 PM    Patient Admission Status: Inpatient   Readmission Risk (Low < 19, Mod (19-27), High > 27): Readmission Risk Score: 16.3    Current PCP: TAVON Abdul CNP  PCP verified by CM? Yes    Chart Reviewed: Yes      History Provided by: Child/Family (Daughter Jania Poole)  Patient Orientation: Alert and Oriented    Patient Cognition: Alert (Hard of hearing)    Hospitalization in the last 30 days (Readmission):  Yes    If yes, Readmission Assessment in  Navigator will be completed. Advance Directives:      Code Status: DNR-CCA   Patient's Primary Decision Maker is: Legal Next of Kin      Discharge Planning:    Patient lives with: Alone Type of Home: Assisted living  Primary Care Giver:  Other (Comment) (Assisted Living facility's staff.)  Patient Support Systems include: Family Members, Other (Comment) (Assisted Living facility's staff.)   Current Financial resources: Medicare  Current community resources: Assisted Living  Current services prior to admission: Durable Medical Equipment, Other (Comment) (Assisted Living facility)            Current DME: Wheelchair, Oxygen Therapy (Comment)            Type of [facility] Care services[facility]  [facility]    ADLS  Prior functional level: Assistance with the following:, Bathing, Dressing, Toileting, Feeding, Cooking, Housework, Shopping, Mobility  Current functional level: Assistance with the following:, Bathing, Dressing, Toileting,

## 2023-04-26 NOTE — PLAN OF CARE
Problem: Discharge Planning  Goal: Discharge to home or other facility with appropriate resources  4/26/2023 1159 by Kendrick Villalobos RN  Outcome: Progressing  Problem: Skin/Tissue Integrity  Goal: Absence of new skin breakdown  Description: 1. Monitor for areas of redness and/or skin breakdown  2. Assess vascular access sites hourly  3. Every 4-6 hours minimum:  Change oxygen saturation probe site  4. Every 4-6 hours:  If on nasal continuous positive airway pressure, respiratory therapy assess nares and determine need for appliance change or resting period.   4/26/2023 1159 by Kendrick Villalobos RN  Outcome: Progressing     Problem: Safety - Adult  Goal: Free from fall injury  4/26/2023 1159 by Kendrick Villalobos RN  Outcome: Progressing     Problem: ABCDS Injury Assessment  Goal: Absence of physical injury  4/26/2023 1159 by Kendrick Villalobos RN  Outcome: Progressing

## 2023-04-26 NOTE — CARE COORDINATION
04/26/23 1505   Readmission Assessment   Number of Days since last admission? 8-30 days   Previous Disposition Other (comment)  ([facility] with 24 hr care)   Who is being Interviewed Caregiver  (Daughter)   What was the patient's/caregiver's perception as to why they think they needed to return back to the hospital? Other (Comment)  (Blood in urine and worsening weakness)   Did you visit your Primary Care Physician after you left the hospital, before you returned this time? Yes  (Seen by the Facility's inhouse doctor)   Did you see a specialist, such as Cardiac, Pulmonary, Orthopedic Physician, etc. after you left the hospital? No  (has an appointment with Urologist on 4/28/23)   Who advised the patient to return to the hospital? Caregiver  (The Assisted living staff and family.)   Does the patient report anything that got in the way of taking their medications? No   In our efforts to provide the best possible care to you and others like you, can you think of anything that we could have done to help you after you left the hospital the first time, so that you might not have needed to return so soon?  Other (Comment)  (\" No\")

## 2023-04-27 LAB
ANION GAP SERPL CALCULATED.3IONS-SCNC: 7 MMOL/L (ref 3–16)
BASOPHILS # BLD: 0.1 K/UL (ref 0–0.2)
BASOPHILS NFR BLD: 0.7 %
BUN SERPL-MCNC: 10 MG/DL (ref 7–20)
CALCIUM SERPL-MCNC: 8.8 MG/DL (ref 8.3–10.6)
CHLORIDE SERPL-SCNC: 106 MMOL/L (ref 99–110)
CO2 SERPL-SCNC: 29 MMOL/L (ref 21–32)
CREAT SERPL-MCNC: <0.5 MG/DL (ref 0.6–1.2)
DEPRECATED RDW RBC AUTO: 16.3 % (ref 12.4–15.4)
EOSINOPHIL # BLD: 0.3 K/UL (ref 0–0.6)
EOSINOPHIL NFR BLD: 3.2 %
GFR SERPLBLD CREATININE-BSD FMLA CKD-EPI: >60 ML/MIN/{1.73_M2}
GLUCOSE SERPL-MCNC: 104 MG/DL (ref 70–99)
HCT VFR BLD AUTO: 38.3 % (ref 36–48)
HGB BLD-MCNC: 12.2 G/DL (ref 12–16)
INR PPP: 1.39 (ref 0.84–1.16)
LYMPHOCYTES # BLD: 1.7 K/UL (ref 1–5.1)
LYMPHOCYTES NFR BLD: 16.9 %
MCH RBC QN AUTO: 27.4 PG (ref 26–34)
MCHC RBC AUTO-ENTMCNC: 32 G/DL (ref 31–36)
MCV RBC AUTO: 85.7 FL (ref 80–100)
MONOCYTES # BLD: 0.7 K/UL (ref 0–1.3)
MONOCYTES NFR BLD: 7.1 %
NEUTROPHILS # BLD: 7.3 K/UL (ref 1.7–7.7)
NEUTROPHILS NFR BLD: 72.1 %
PLATELET # BLD AUTO: 358 K/UL (ref 135–450)
PMV BLD AUTO: 8.2 FL (ref 5–10.5)
POTASSIUM SERPL-SCNC: 3.6 MMOL/L (ref 3.5–5.1)
PROTHROMBIN TIME: 17 SEC (ref 11.5–14.8)
RBC # BLD AUTO: 4.47 M/UL (ref 4–5.2)
SODIUM SERPL-SCNC: 142 MMOL/L (ref 136–145)
WBC # BLD AUTO: 10.2 K/UL (ref 4–11)

## 2023-04-27 PROCEDURE — 80048 BASIC METABOLIC PNL TOTAL CA: CPT

## 2023-04-27 PROCEDURE — 1200000000 HC SEMI PRIVATE

## 2023-04-27 PROCEDURE — 6360000002 HC RX W HCPCS: Performed by: NURSE PRACTITIONER

## 2023-04-27 PROCEDURE — 6370000000 HC RX 637 (ALT 250 FOR IP): Performed by: INTERNAL MEDICINE

## 2023-04-27 PROCEDURE — 2580000003 HC RX 258: Performed by: NURSE PRACTITIONER

## 2023-04-27 PROCEDURE — 85610 PROTHROMBIN TIME: CPT

## 2023-04-27 PROCEDURE — 36415 COLL VENOUS BLD VENIPUNCTURE: CPT

## 2023-04-27 PROCEDURE — 85025 COMPLETE CBC W/AUTO DIFF WBC: CPT

## 2023-04-27 RX ORDER — WARFARIN SODIUM 5 MG/1
5 TABLET ORAL
Status: COMPLETED | OUTPATIENT
Start: 2023-04-27 | End: 2023-04-27

## 2023-04-27 RX ADMIN — LISINOPRIL 2.5 MG: 5 TABLET ORAL at 09:37

## 2023-04-27 RX ADMIN — MULTIPLE VITAMINS W/ MINERALS TAB 1 TABLET: TAB at 09:38

## 2023-04-27 RX ADMIN — CEFTRIAXONE SODIUM 1000 MG: 1 INJECTION, POWDER, FOR SOLUTION INTRAMUSCULAR; INTRAVENOUS at 03:11

## 2023-04-27 RX ADMIN — WARFARIN SODIUM 5 MG: 5 TABLET ORAL at 11:44

## 2023-04-27 RX ADMIN — FERROUS SULFATE TAB 325 MG (65 MG ELEMENTAL FE) 325 MG: 325 (65 FE) TAB at 16:28

## 2023-04-27 RX ADMIN — FERROUS SULFATE TAB 325 MG (65 MG ELEMENTAL FE) 325 MG: 325 (65 FE) TAB at 09:40

## 2023-04-27 RX ADMIN — WARFARIN SODIUM 5 MG: 5 TABLET ORAL at 16:28

## 2023-04-27 RX ADMIN — LEVOTHYROXINE SODIUM 75 MCG: 0.07 TABLET ORAL at 09:38

## 2023-04-27 RX ADMIN — TAMSULOSIN HYDROCHLORIDE 0.4 MG: 0.4 CAPSULE ORAL at 09:38

## 2023-04-27 ASSESSMENT — PAIN SCALES - GENERAL
PAINLEVEL_OUTOF10: 0

## 2023-04-27 NOTE — H&P
[facility]                     [address] [facility], [address]                              HISTORY AND PHYSICAL    PATIENT NAME: Pete Melendez                      :        1934  MED REC NO:   [id number]                          ROOM:       5  ACCOUNT NO:   [de-identified]                           ADMIT DATE: 2023  PROVIDER:     Shirley Toledo MD    HISTORY OF PRESENT ILLNESS:  The patient is an 72-year-old white  American lady who came to the emergency room with increasing lethargy,  poor mentation, some low-grade fever and chills, poor oral intake and  just not acting herself. She is a patient of Dr. Zev Burk, who is  her nurse practitioner at the nursing home. She has generalized  weakness according to the family. She was weaker than usual.  She does  not usually feel that way. She denied any active symptoms such as  nausea, vomiting or abdominal pain. There were some chills, but no  fever, no diarrhea, no dysuria. There may be some hematuria. The  patient recently had a stent placement for a calculus in the right  kidney. Her p.o. intake has been reasonably good. The patient is  already on cefuroxime. PAST MEDICAL HISTORY:  Pertinent for cognitive deficits, memory loss,  hypothyroidism, scoliosis, pulmonary embolism, pneumonia, pulmonary  arterial hypertension, movement disorder, lumbar herniated disc, kidney  stone, hypertension, hyperlipidemia and osteoarthritis. PAST SURGICAL HISTORY:  Pertinent for colonoscopy, neck surgery,  cystoscopy and stent placement on , excision biopsy of the skin  lesion, and joint replacement of both the knees. FAMILY HISTORY:  Both the parents are  because of natural  causes. They both had hyperlipidemia. MEDICATIONS:  The patient is on ferrous sulfate, levothyroxine,  lisinopril, riociguat, tamsulosin, multivitamin, and warfarin.     ALLERGIES:  The patient is allergic to

## 2023-04-28 VITALS
HEART RATE: 84 BPM | SYSTOLIC BLOOD PRESSURE: 134 MMHG | TEMPERATURE: 97.3 F | DIASTOLIC BLOOD PRESSURE: 77 MMHG | HEIGHT: 60 IN | BODY MASS INDEX: 30.43 KG/M2 | WEIGHT: 155 LBS | OXYGEN SATURATION: 93 % | RESPIRATION RATE: 16 BRPM

## 2023-04-28 LAB
BACTERIA UR CULT: ABNORMAL
ORGANISM: ABNORMAL

## 2023-04-28 PROCEDURE — 6360000002 HC RX W HCPCS: Performed by: NURSE PRACTITIONER

## 2023-04-28 PROCEDURE — 2580000003 HC RX 258: Performed by: NURSE PRACTITIONER

## 2023-04-28 PROCEDURE — 6370000000 HC RX 637 (ALT 250 FOR IP): Performed by: INTERNAL MEDICINE

## 2023-04-28 RX ORDER — CIPROFLOXACIN 500 MG/1
500 TABLET, FILM COATED ORAL EVERY 12 HOURS SCHEDULED
Qty: 14 TABLET | Refills: 0 | Status: SHIPPED | OUTPATIENT
Start: 2023-04-28 | End: 2023-05-05

## 2023-04-28 RX ORDER — CIPROFLOXACIN 500 MG/1
500 TABLET, FILM COATED ORAL EVERY 12 HOURS SCHEDULED
Status: DISCONTINUED | OUTPATIENT
Start: 2023-04-28 | End: 2023-04-28 | Stop reason: HOSPADM

## 2023-04-28 RX ADMIN — LEVOTHYROXINE SODIUM 75 MCG: 0.07 TABLET ORAL at 09:20

## 2023-04-28 RX ADMIN — CIPROFLOXACIN HYDROCHLORIDE 500 MG: 500 TABLET, FILM COATED ORAL at 11:38

## 2023-04-28 RX ADMIN — TAMSULOSIN HYDROCHLORIDE 0.4 MG: 0.4 CAPSULE ORAL at 09:20

## 2023-04-28 RX ADMIN — LISINOPRIL 2.5 MG: 5 TABLET ORAL at 09:20

## 2023-04-28 RX ADMIN — FERROUS SULFATE TAB 325 MG (65 MG ELEMENTAL FE) 325 MG: 325 (65 FE) TAB at 09:20

## 2023-04-28 RX ADMIN — CEFTRIAXONE SODIUM 1000 MG: 1 INJECTION, POWDER, FOR SOLUTION INTRAMUSCULAR; INTRAVENOUS at 04:07

## 2023-04-28 RX ADMIN — MULTIPLE VITAMINS W/ MINERALS TAB 1 TABLET: TAB at 09:20

## 2023-04-28 ASSESSMENT — PAIN SCALES - GENERAL
PAINLEVEL_OUTOF10: 0

## 2023-04-28 NOTE — CARE COORDINATION
Discharge note:      CM/SW has been notified of discharge. Patient noted to have the following needs at discharge. CM/SW has coordinated the following services: Assisted Living . Patient discharged to: [facility]. [address]  [place], [address]  Phone: [phone]  Fax: [phone]     SW/DC Planner faxed, [address] [facility] - [phone]  Narcotic Prescriptions faxed were:  No  RN: Jose Guadalupe Sumner will call report to:   [phone] with:[facility] [phone]    time:  1500  Family advised of discharge?:  Sabine Stanton?:    N/A     All CM/SW needs met, will sign off.

## 2023-04-28 NOTE — PLAN OF CARE
Problem: Discharge Planning  Goal: Discharge to home or other facility with appropriate resources  4/28/2023 1520 by Travis Vinson RN  Outcome: Completed  4/28/2023 1520 by Travis Vinson RN  Outcome: Progressing     Problem: Skin/Tissue Integrity  Goal: Absence of new skin breakdown  Description: 1. Monitor for areas of redness and/or skin breakdown  2. Assess vascular access sites hourly  3. Every 4-6 hours minimum:  Change oxygen saturation probe site  4. Every 4-6 hours:  If on nasal continuous positive airway pressure, respiratory therapy assess nares and determine need for appliance change or resting period.   4/28/2023 1520 by Travis Vinson RN  Outcome: Completed  4/28/2023 1520 by Travis Vinson RN  Outcome: Progressing  4/28/2023 0451 by Jagdeep Nascimento RN  Outcome: Progressing     Problem: Safety - Adult  Goal: Free from fall injury  4/28/2023 1520 by Travis Vinson RN  Outcome: Completed  4/28/2023 1520 by Travis Vinson RN  Outcome: Progressing  4/28/2023 0451 by Jagdeep Nascimento RN  Outcome: Progressing     Problem: ABCDS Injury Assessment  Goal: Absence of physical injury  4/28/2023 1520 by Travis Vinson RN  Outcome: Completed  4/28/2023 1520 by Travis Vinson RN  Outcome: Progressing  4/28/2023 0451 by Jagdeep Nascimento RN  Outcome: Progressing     Problem: Pain  Goal: Verbalizes/displays adequate comfort level or baseline comfort level  4/28/2023 1520 by Travis Vinson RN  Outcome: Completed  4/28/2023 1520 by Travis Vinson RN  Outcome: Progressing

## 2023-04-28 NOTE — PROGRESS NOTES
Clinical Pharmacy Note: Pharmacy to Dose Warfarin    Pharmacy consulted by Dr. Lucas Espino to dose warfarin. Miles Hinojosa is a 80 y.o. female  is receiving warfarin for indication: Afib/Flutter. INR Goal Range: 2-3  Prior to admission warfarin dosing regimen: 5mg daily  INR today:   Lab Results   Component Value Date/Time    INR 1.44 04/25/2023 04:20 PM       Assessment/Plan:  INR is subtherapeutic on prior to admission dosing regimen. Based on today's assessment, dose warfarin 5mg. Daily INR is ordered. Pharmacy will continue to monitor and make adjustments to regimen as necessary.      Thank you for the consult,     Ricardo Guzman, SerenaD
Data- discharge order received, pt and pt's family members (daughter Alis Wilhelm and son) (appointed legal authority) verbalized agreement to discharge, disposition to [facility]  #[id number], Meredith Muhammad reviewed and signed by physician. Action- AVS prepared, ANDRADE completed/ reported faxed by case management/. Discharge instruction summary: Diet- regular, Activity- wheelchair bound, immunizations reviewed and updated, medications prescriptions to be filled at receiving facility, Transfer code status: DNR-CCA, LDAs to remain with discharge: none. DME used: wheelchair. Response- Bedside RN to call report to receiving facility. Pt belongings gathered, peripheral IV and cardiac monitoring removed. Disposition to Discharged via cart/stretcher and via ambulance to assisted living by EMS transportation, no complications reported. 1. WEIGHT: Admit Weight: 144 lb (65.3 kg) (04/25/23 1519)        Today  Weight: 155 lb (70.3 kg) (04/28/23 0055)       2.  O2 SAT.: SpO2: 93 % (04/28/23 1130)
Department of Internal Medicine  General Internal Medicine   Progress Note      SUBJECTIVE: feeling better , no fever or chills  denies abdominal pain     History obtained from chart review, the patient, and nursing staff   General ROS: positive for  - fatigue, malaise, and weight loss  negative for - chills, fever, or night sweats  Psychological ROS: negative  Ophthalmic ROS: negative  Respiratory ROS: no cough, shortness of breath, or wheezing  Cardiovascular ROS: no chest pain or dyspnea on exertion  Gastrointestinal ROS: no abdominal pain, change in bowel habits, or black or bloody stools  Genito-Urinary ROS: no dysuria, trouble voiding, or hematuria  Musculoskeletal ROS: negative  Neurological ROS: no TIA or stroke symptoms  Dermatological ROS: negative    OBJECTIVE      Medications      Current Facility-Administered Medications: cefTRIAXone (ROCEPHIN) 1,000 mg in sodium chloride 0.9 % 50 mL IVPB (mini-bag), 1,000 mg, IntraVENous, Q24H  docusate sodium (COLACE) capsule 100 mg, 100 mg, Oral, Daily PRN  ferrous sulfate (IRON 325) tablet 325 mg, 325 mg, Oral, BID WC  levothyroxine (SYNTHROID) tablet 75 mcg, 75 mcg, Oral, Daily  lisinopril (PRINIVIL;ZESTRIL) tablet 2.5 mg, 2.5 mg, Oral, Daily  therapeutic multivitamin-minerals 1 tablet, 1 tablet, Oral, Daily  Riociguat TABS 2.5 mg (Patient Supplied), 2.5 mg, Oral, TID  tamsulosin (FLOMAX) capsule 0.4 mg, 0.4 mg, Oral, Daily  warfarin (COUMADIN) tablet 5 mg, 5 mg, Oral, Daily    Physical      Vitals: /68   Pulse 83   Temp 97.8 °F (36.6 °C) (Temporal)   Resp 14   Ht 5' (1.524 m)   Wt 155 lb (70.3 kg)   SpO2 94%   BMI 30.27 kg/m²   Temp: Temp: 97.8 °F (36.6 °C)  Max: Temp  Av.7 °F (36.5 °C)  Min: 97.2 °F (36.2 °C)  Max: 98 °F (36.7 °C)  Respiration range:  Resp  Av  Min: 14  Max: 18  Pulse Range:  Pulse  Av.8  Min: 67  Max: 95  Blood pressure range:  Systolic (11UPW), WBX:932 , Min:116 , DLH:721   , Diastolic (00JNC), MXW:94, Min:54,
Hospital Problems             Last Modified POA    * (Principal) UTI (urinary tract infection) 4/25/2023 Yes    Essential hypertension 4/26/2023 Yes    Pulmonary hypertension ([address] 4/26/2023 Yes    Pulmonary emboli ([address] 4/26/2023 Yes    Altered mental status 4/26/2023 Yes   H&P dictated
Pharmacy to Dose Warfarin    Pharmacy consulted to dose warfarin for Afib/Aflutter. INR Goal: 2-3    INR today: 1.52    Assessment/Plan:  - INR subtherapeutic  - Did not receive warfarin dose yesterday   - Will continue home dose of warfarin 5 mg tonight    Pharmacy will continue to follow.     Rosalee Hernandez, PharmD, BCPS  Clinical Pharmacist  [id number]
Pharmacy to Dose Warfarin    Pharmacy consulted to dose warfarin for a fib. INR Goal: 2.0-3.0    INR today: 1.39    Assessment/Plan:  - Additional 5 mg dose to be given now. - Continue 5 mg daily  - Possible concomitant drug-drug interactions include: levothyroxine      Pharmacy will continue to follow.     Prashant Dejesus, SerenaD, [facility]  [id number]
Pt admitted to room 5910 from ED. VSS. Pt A&O to self and place, disoriented but easily reoriented to time and situation. POC updated with pt, all questions answered. Oriented pt to room and call light. Call light and bedside table within reach. Bed alarm and fall sign active, pt wearing yellow fall socks and fall bracelet. Instructed to call out with any needs.
Pt refuse VSS, assessment , a.m med and lab. Pt made aware the need of above, pt said let me be. Will reassess.
Pt refused to allow the  draw her blood for morning labs. This RN spoke to the pt and explained that the doctor had ordered blood work and that we needed to monitor her health. The pt remained adamant and refused to allow her blood to be drawn. This RN told the lab to return on day shift and reattempt d/t the pt experiencing sundowning behaviors and only being oriented to self at this time.
This RN called report to the receiving nurse at [facility].
Mobility  Stair mobility not completed on this date. Comments:  Wheelchair Mobility:  No w/c mobility completed on this date. Comments:  Balance  Comments: unable to formally assess. Patient refusing mobility beyond rolling for pericare. Other Therapeutic Interventions  Brief changed, external catheter replaced. Gown changed. Functional Outcomes  -PAC Inpatient Mobility Raw Score : 8              Cognition  Overall Cognitive Status: Impaired  Memory: decreased recall of recent events, decreased long term memory  Orientation:    oriented to person and oriented to place  Command Following:   accurately follows one step commands    Education  Barriers To Learning: cognition  Patient Education: patient educated on goals, PT role and benefits, plan of care, discharge recommendations  Learning Assessment:  patient verbalizes understanding, would benefit from continued reinforcement    Assessment  Activity Tolerance: Poor  Impairments Requiring Therapeutic Intervention: none - eval with same day discharge  Clinical Assessment: Patient is presenting secondary to UTI. She is currently functioning at baseline (dependent and non-ambulatory). No skilled services needed at this time. Safety Interventions: patient left in bed, bed alarm in place, call light within reach, and patient at risk for falls    Plan  Frequency: Eval with same day discharge. No follow up required. Current Treatment Recommendations: not applicable, evaluation completed with same day discharge. Goals  Patient eval with same day discharge. No goals set as patient is at baseline mobility status. Above goals reviewed on 4/26/2023. All goals are ongoing at this time unless indicated above.     Therapy Session Time      Individual Group Co-treatment   Time In     8258   Time Out     1435   Minutes     38     Timed Code Treatment Minutes:  23 Minutes  Total Treatment Minutes:  Kalyani 35, SPT   Electronically Signed By: Rolly Holden
this date. Pt on 2L 02 this date. Pain: 0/10  Pain Interventions: repositioned         Activities of Daily Living  Basic Activities of Daily Living  Feeding: setup assistance  Upper Extremity Dressing: minimal assistance  Lower Extremity Dressing: dependent  Dressing Comments: mikey/doff gown - assist w/ line management; DEP mikey socks supine in bed   Toileting: dependent. Toileting Comments: pt incontinent of urine and bowels this date - DEP for robin care w/ increased time; brief changed and new pure wick placed secondary to BM on pure wick  General Comments: Pt denied all other ADLs this date; bandage on sacral wound  Instrumental Activities of Daily Living  No IADL completed on this date. Functional Mobility  Bed Mobility  Rolling Left: maximum assistance  Rolling Right: moderate assistance  Comments: increased time for all; increased assist required for rolling to L vs R  Transfers  No transfers completed on this date secondary to energy levels and safety concerns. Comments:  Functional Mobility:  No functional mobility completed on this date secondary to energy levels and safety concerns.     Other Therapeutic Interventions      Functional Outcomes  AM-PAC Inpatient Daily Activity Raw Score: 11    Cognition  Overall Cognitive Status: Impaired  Following Commands: follows one step commands with repetition, follows one step commands with increased time  Attention Span: unable to maintain attention  Memory: decreased recall of recent events, decreased short term memory  Initiation: requires cues for all  Sequencing: requires cues for all  Orientation:    oriented to person, oriented to place, disoriented to time , and disoriented to situation  Command Following:   impaired     Education  Barriers To Learning: cognition and physical  Patient Education: patient educated on goals, OT role and benefits, plan of care, ADL adaptive strategies, orientation, discharge recommendations  Learning Assessment:  patient

## 2023-04-28 NOTE — PLAN OF CARE
Problem: Discharge Planning  Goal: Discharge to home or other facility with appropriate resources  Outcome: Progressing     Problem: Skin/Tissue Integrity  Goal: Absence of new skin breakdown  Description: 1. Monitor for areas of redness and/or skin breakdown  2. Assess vascular access sites hourly  3. Every 4-6 hours minimum:  Change oxygen saturation probe site  4. Every 4-6 hours:  If on nasal continuous positive airway pressure, respiratory therapy assess nares and determine need for appliance change or resting period.   4/28/2023 1520 by Norma Samuel RN  Outcome: Progressing     Problem: Safety - Adult  Goal: Free from fall injury  4/28/2023 1520 by Norma Samuel RN  Outcome: Progressing     Problem: ABCDS Injury Assessment  Goal: Absence of physical injury  4/28/2023 1520 by Norma Samuel RN  Outcome: Progressing    Problem: Pain  Goal: Verbalizes/displays adequate comfort level or baseline comfort level  Outcome: Progressing

## 2023-04-28 NOTE — DISCHARGE INSTR - COC
Continuity of Care Form    Patient Name: Jenna Handy   :  1934  MRN:  [id number]    Admit date:  2023  Discharge date:  2023    Code Status Order: DNR-CCA   Advance Directives:     Admitting Physician:  Luis Alvarez MD  PCP: TAVON Villanueva CNP    Discharging Nurse: [facility] Unit/Room#: R1P-1291/8450-62  Discharging Unit Phone Number: [phone]    Emergency Contact:   Extended Emergency Contact Information  Primary Emergency Contact: Ernestina Baumann [address] Phone: [phone]  Mobile Phone: [phone]  Relation: Child  Secondary Emergency Contact: Ale Awad [address] Phone: [phone]  Mobile Phone: [phone]  Relation: Child    Past Surgical History:  Past Surgical History:   Procedure Laterality Date    COLONOSCOPY      CYSTOSCOPY Right 2023    CYSTOSCOPY, RIGHT RETROGRADE, RIGHT URETERAL STENT INSERTION, GRULLON PLACEMENT performed by Chang Truong MD at [facility]  / BIOPSY SKIN LESION OF ARM Left 2019    EXCISION LEFT FOREARM MASS performed by Jhony Bhandari MD at [facility]      both knees    NECK SURGERY Right 2019    EXCISION OF RIGHT NECK MASS performed by Jhony Bhandari MD at [facility]       Immunization History:   Immunization History   Administered Date(s) Administered    COVID-19, MODERNA BLUE border, Primary or Immunocompromised, (age 12y+), IM, 100 mcg/0.5mL 2021, 2021    DT (pediatric) 10/18/2012    Influenza Virus Vaccine 2013, 2015    Influenza Whole 2011    Influenza, FLUAD, (age 72 y+), Adjuvanted, 0.5mL 2021    Influenza, High Dose (Fluzone 65 yrs and older) 10/15/2012, 2014, 2016, 2017, 10/19/2018, 10/13/2019    Pneumococcal, PCV-13, PREVNAR 13, (age 6w+), IM, 0.5mL 2017    Pneumococcal, PPSV23, PNEUMOVAX 23, (age 2y+), SC/IM, 0.5mL 1997, 10/13/2011

## 2023-04-28 NOTE — CARE COORDINATION
Discharge Planning; Discharge order noted for patient to go back to the Assisted Living facility. CM discussed with the patient's son -Carolina Odom and daughter-Celina. Transport was schedule with Pickup time of 1500 via [facility]. CM called and spoke with  Nurse Herlinda Gregg at [address] [place] [address] who stated that the facility needs to talk with the patient's family regarding patient going back there because patient needs more  care than what facility can provide. Family was updated and patient's son -Carolina Odom was going to call the facility. Addendum;   ENEIDA spoke with Tika Salcido at [address] [place] [address] admissions who stated that it is okay for patient to go back . Requested AVS/ANDRADE faxed to [phone] and Nurse to Nurse [phone]. ENEIDA also spoke with patient's son Carolina Odom who stated patient will go back to the assisted Living under Hospice care. The facility will coordinate.

## 2023-04-28 NOTE — CARE COORDINATION
04/28/23 1614   IMM Letter   IMM Letter given to Patient/Family/Significant other/Guardian/POA/by: Second IMM letter given to patient's son-Sky over the phone by Jesusita Carrasco RN/[facility]   IMM Letter date given: 04/28/23   IMM Letter time given: 6095     The original went with patient to facility , family will get it from there.

## 2023-06-15 ENCOUNTER — PATIENT MESSAGE (OUTPATIENT)
Dept: FAMILY MEDICINE CLINIC | Age: 88
End: 2023-06-15

## 2024-04-23 NOTE — PROGRESS NOTES
[facility]  Progress Note    Primary Care Doctor:  Abhilash Pettit MD    Chief Complaint   Patient presents with    3 Month Follow-Up     CTEPH        History of Present Illness:  80 y.o. 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 She is in a wheel chair with her daughter, Kedar Masterson today. She did not bring her walker today in order to do a 6 minute walk. She is unsteady on her feet without the walker and therefore, not able to be weighed. Her breathing is stable, continues on her 2 L of oxygen, no increase and she has not needed any lasix for a couple of months. She does have a skin lesion on her right face (jaw area) that she may consider having removed. Past Medical History:   has a past medical history of Arthritis, Hyperlipidemia, Hypertension, Hypothyroidism, Movement disorder, Pulmonary emboli ([address] Scoliosis, Scoliosis, and Thyroid disease. Surgical History:   has a past surgical history that includes Colonoscopy (7/08) and joint replacement. Social History:   reports that she has quit smoking. Her smoking use included cigarettes. She has a 3.75 pack-year smoking history. She quit smokeless tobacco use about 53 years ago. She reports that she does not drink alcohol or use drugs. Family History:   Family History   Problem Relation Age of Onset    High Cholesterol Mother     High Cholesterol Father     Cancer Brother        Home Medications:  Prior to Admission medications    Medication Sig Start Date End Date Taking?  Authorizing Provider   Riociguat (ADEMPAS) 2.5 MG TABS Take 2.5 mg by mouth 3 times daily 7/10/19  Yes Alondra Smalls, APRN - CNS   lisinopril (PRINIVIL;ZESTRIL) 2.5 MG tablet TAKE 1 TABLET BY MOUTH What is eustachian tube dysfunction?  The prolonged failure to equalise pressure in the middle ear. This inadequate ventilation of the chamber behind the eardrum can be the result of sinus, throat, nose and ear conditions, including common colds and allergies.    What are the symptoms?  The main symptom is muffled or dull hearing, often described as being underwater or ears with cotton wool.    Ear pain can be due to a pressure difference causing the drum to over stretch. Other symptoms include fullness in the ear (pressure), tinnitus (ringing), dizziness, popping or clicking noises.    Treatment  If the symptoms are mild usually it will resolve on its own.    Yawn or open your mouth widely as if you were yawning. Eating and drinking may also mobilise the Eustachian tube to allow some air travel through the tube.        Nasal decongestants may be used if there is some blockage. This is available over the counter.     Steam inhalations with menthol, eucalyptus, or other oils in boiling water in the sink with a towel over the head or any other kind of inhalation device.    Sucking a boiled sweet such as menthol or eucalyptus to unblock the nose.    Taking antihistamines if the swelling is caused by allergies.   pruritis. · Neurological: No headache, diplopia, change in muscle strength, numbness or tingling. No change in gait, balance, coordination, mood, affect, memory, mentation, behavior. · Psychiatric: No anxiety, no depression. · Endocrine: No malaise, fatigue or temperature intolerance. No excessive thirst, fluid intake, or urination. No tremor. · Hematologic/Lymphatic: No abnormal bruising or bleeding, blood clots or swollen lymph nodes. · Allergic/Immunologic: No nasal congestion or hives. Physical Examination:    Vitals:    07/15/19 1301   BP: 116/66   Site: Left Upper Arm   Position: Sitting   Cuff Size: Medium Adult   Pulse: 82   Weight: 144 lb (65.3 kg)   Height: 5' (1.524 m)        Constitutional and General Appearance: Warm and dry, no apparent distress, normal coloration  HEENT:  Normocephalic, atraumatic  Respiratory:  · Normal excursion and expansion without use of accessory muscles  · Resp Auscultation: Normal breath sounds without dullness  Cardiovascular:  · The apical impulses not displaced  · Heart tones are crisp and normal  · JVP normal cm H2O  · Regular rate and rhythm  · Peripheral pulses are symmetrical and full  · There is no clubbing, cyanosis of the extremities.   · no edema  · Pedal Pulses: 2+ and equal   Abdomen:  · No masses or tenderness  · Liver/Spleen: No Abnormalities Noted  Neurological/Psychiatric:  · Alert and oriented in all spheres  · Moves all extremities well  · Exhibits normal gait balance and coordination  · No abnormalities of mood, affect, memory, mentation, or behavior are noted    Lab Data:    CBC:   Lab Results   Component Value Date    WBC 6.7 05/16/2019    WBC 8.1 01/11/2019    WBC 7.7 12/05/2018    RBC 5.04 05/16/2019    RBC 5.20 01/11/2019    RBC 4.69 12/05/2018    HGB 13.9 05/16/2019    HGB 13.7 01/11/2019    HGB 12.3 12/05/2018    HCT 42.4 05/16/2019    HCT 42.9 01/11/2019    HCT 38.8 12/05/2018    MCV 84.2 05/16/2019    MCV 82.5 01/11/2019    MCV 82.8 valve area - 1.08 cm^2. No significant regurgitation noted. -Mild-to-moderate tricuspid regurgitation with a RVSP estimation of 70 mmHg, suggestive of severe pulm HTN.    -Normal diastolic function. E/e'=10.3     CT chest pulmonary 11/28/18 : no evidence or proximal PE but enlarged main pulmonary artery which can be seen with pulmonary HTN    Assessment:    1. CTEPH (chronic thromboembolic pulmonary hypertension) ([facility] on adempas    2. PAH (pulmonary artery hypertension) ([facility]    3. Essential hypertension    4. Other pulmonary embolism without acute cor pulmonale, unspecified chronicity (HCC) on coumadin managed by Dr Josie Gitelman:   Patient Instructions   1. Bring walker with you to next appointment to do 6 minute walk  2. Continue all current medications  3. RTO in 3 months  4. Labs with next appointment    I appreciate the opportunity of cooperating in the care of this individual.    Arnold Christianson, CNS, 7/15/2019, 1:42 PM    QUALITY MEASURES  1. Tobacco Cessation Counseling: na  2. Retake of BP if >140/90:  na  3. Documentation to PCP/referring for new patient:  Sent to PCP at close of office visit  4. CAD patient on anti-platelet:na  5. CAD patient on STATIN therapy:  na  6.  Patient with CHF and aFib on anticoagulation:  On coumadin but for PE

## (undated) DEVICE — TOTAL TRAY, DB, 100% SILI FOLEY, 16FR 10: Brand: MEDLINE

## (undated) DEVICE — CYSTO PACK: Brand: MEDLINE INDUSTRIES, INC.

## (undated) DEVICE — POSITIONER HD W8XH4XL8.5IN RASPBERRY FOAM SLT

## (undated) DEVICE — SHEET,DRAPE,53X77,STERILE: Brand: MEDLINE

## (undated) DEVICE — BAG DRAINAGE NS

## (undated) DEVICE — INTENDED FOR TISSUE SEPARATION, AND OTHER PROCEDURES THAT REQUIRE A SHARP SURGICAL BLADE TO PUNCTURE OR CUT.: Brand: BARD-PARKER ® STAINLESS STEEL BLADES

## (undated) DEVICE — CATHETER URET 5FR L70CM OPN END SGL LUMN INJ HUB FLEXIMA

## (undated) DEVICE — CHLORAPREP 26ML ORANGE

## (undated) DEVICE — SUTURE MCRYL SZ 4-0 L18IN ABSRB UD L19MM PS-2 3/8 CIR PRIM Y496G

## (undated) DEVICE — CYSTO/BLADDER IRRIGATION SET, REGULATING CLAMP

## (undated) DEVICE — BLADE ES ELASTOMERIC COAT INSUL DURABLE BEND UPTO 90DEG

## (undated) DEVICE — MAJOR SET UP PK

## (undated) DEVICE — SOLUTION IRRIG 1000ML STRL H2O USP PLAS POUR BTL

## (undated) DEVICE — GUIDEWIRE ENDOSCP L150CM DIA0.035IN TIP 3CM PTFE NIT

## (undated) DEVICE — TOWEL,OR,DSP,ST,BLUE,STD,4/PK,20PK/CS: Brand: MEDLINE

## (undated) DEVICE — CONTROL SYRINGE LUER-LOCK TIP: Brand: MONOJECT

## (undated) DEVICE — GLOVE SURG SZ 6.5 L11.2IN FNGR THK9.8MIL STRW LTX POLYMER

## (undated) DEVICE — SHEET,DRAPE,40X58,STERILE: Brand: MEDLINE

## (undated) DEVICE — NEEDLE HYPO 22GA L1.5IN BLK POLYPR HUB S STL REG BVL STR

## (undated) DEVICE — SOLUTION IRRIGATION STRL H2O 1000 ML UROMATIC CONTAINER

## (undated) DEVICE — SUTURE VCRL SZ 3-0 L18IN ABSRB UD L26MM SH 1/2 CIR J864D

## (undated) DEVICE — SKIN AFFIX SURG ADHESIVE 72/CS 0.55ML: Brand: MEDLINE

## (undated) DEVICE — COVER LT HNDL BLU PLAS

## (undated) DEVICE — SYRINGE MED 10ML SLIP TIP BLNT FILL AND LUERLOCK DISP

## (undated) DEVICE — GOWN SIRUS NONREIN LG W/TWL: Brand: MEDLINE INDUSTRIES, INC.

## (undated) DEVICE — KIT OR ROOM TURNOVER W/STRAP

## (undated) DEVICE — SOLUTION IV IRRIG POUR BRL 0.9% SODIUM CHL 2F7124

## (undated) DEVICE — WET SKIN PREP TRAY: Brand: MEDLINE INDUSTRIES, INC.

## (undated) DEVICE — Device: Brand: MEDEX

## (undated) DEVICE — GLOVE ORANGE PI 7   MSG9070